# Patient Record
Sex: FEMALE | Race: BLACK OR AFRICAN AMERICAN | NOT HISPANIC OR LATINO | Employment: OTHER | URBAN - METROPOLITAN AREA
[De-identification: names, ages, dates, MRNs, and addresses within clinical notes are randomized per-mention and may not be internally consistent; named-entity substitution may affect disease eponyms.]

---

## 2023-03-08 LAB
ALANINE AMINOTRANSFERASE (SGPT) (U/L) IN SER/PLAS: 22 U/L (ref 7–45)
ALBUMIN (G/DL) IN SER/PLAS: 3.8 G/DL (ref 3.4–5)
ALKALINE PHOSPHATASE (U/L) IN SER/PLAS: 91 U/L (ref 33–136)
ANION GAP IN SER/PLAS: 13 MMOL/L (ref 10–20)
ASPARTATE AMINOTRANSFERASE (SGOT) (U/L) IN SER/PLAS: 20 U/L (ref 9–39)
BASOPHILS (10*3/UL) IN BLOOD BY AUTOMATED COUNT: 0.05 X10E9/L (ref 0–0.1)
BASOPHILS/100 LEUKOCYTES IN BLOOD BY AUTOMATED COUNT: 0.7 % (ref 0–2)
BILIRUBIN DIRECT (MG/DL) IN SER/PLAS: 0.1 MG/DL (ref 0–0.3)
BILIRUBIN TOTAL (MG/DL) IN SER/PLAS: 0.5 MG/DL (ref 0–1.2)
CALCIUM (MG/DL) IN SER/PLAS: 9.6 MG/DL (ref 8.6–10.6)
CARBON DIOXIDE, TOTAL (MMOL/L) IN SER/PLAS: 28 MMOL/L (ref 21–32)
CHLORIDE (MMOL/L) IN SER/PLAS: 107 MMOL/L (ref 98–107)
CHOLESTEROL (MG/DL) IN SER/PLAS: 175 MG/DL (ref 0–199)
CHOLESTEROL IN HDL (MG/DL) IN SER/PLAS: 48.9 MG/DL
CHOLESTEROL/HDL RATIO: 3.6
CREATININE (MG/DL) IN SER/PLAS: 0.91 MG/DL (ref 0.5–1.05)
EOSINOPHILS (10*3/UL) IN BLOOD BY AUTOMATED COUNT: 0.06 X10E9/L (ref 0–0.4)
EOSINOPHILS/100 LEUKOCYTES IN BLOOD BY AUTOMATED COUNT: 0.8 % (ref 0–6)
ERYTHROCYTE DISTRIBUTION WIDTH (RATIO) BY AUTOMATED COUNT: 17 % (ref 11.5–14.5)
ERYTHROCYTE MEAN CORPUSCULAR HEMOGLOBIN CONCENTRATION (G/DL) BY AUTOMATED: 30 G/DL (ref 32–36)
ERYTHROCYTE MEAN CORPUSCULAR VOLUME (FL) BY AUTOMATED COUNT: 89 FL (ref 80–100)
ERYTHROCYTES (10*6/UL) IN BLOOD BY AUTOMATED COUNT: 4.66 X10E12/L (ref 4–5.2)
GFR FEMALE: 67 ML/MIN/1.73M2
GLUCOSE (MG/DL) IN SER/PLAS: 182 MG/DL (ref 74–99)
HEMATOCRIT (%) IN BLOOD BY AUTOMATED COUNT: 41.3 % (ref 36–46)
HEMOGLOBIN (G/DL) IN BLOOD: 12.4 G/DL (ref 12–16)
IMMATURE GRANULOCYTES/100 LEUKOCYTES IN BLOOD BY AUTOMATED COUNT: 0.3 % (ref 0–0.9)
LDL: 104 MG/DL (ref 0–99)
LEUKOCYTES (10*3/UL) IN BLOOD BY AUTOMATED COUNT: 7.4 X10E9/L (ref 4.4–11.3)
LYMPHOCYTES (10*3/UL) IN BLOOD BY AUTOMATED COUNT: 1.3 X10E9/L (ref 0.8–3)
LYMPHOCYTES/100 LEUKOCYTES IN BLOOD BY AUTOMATED COUNT: 17.7 % (ref 13–44)
MONOCYTES (10*3/UL) IN BLOOD BY AUTOMATED COUNT: 0.62 X10E9/L (ref 0.05–0.8)
MONOCYTES/100 LEUKOCYTES IN BLOOD BY AUTOMATED COUNT: 8.4 % (ref 2–10)
NEUTROPHILS (10*3/UL) IN BLOOD BY AUTOMATED COUNT: 5.31 X10E9/L (ref 1.6–5.5)
NEUTROPHILS/100 LEUKOCYTES IN BLOOD BY AUTOMATED COUNT: 72.1 % (ref 40–80)
NRBC (PER 100 WBCS) BY AUTOMATED COUNT: 0 /100 WBC (ref 0–0)
PLATELETS (10*3/UL) IN BLOOD AUTOMATED COUNT: 284 X10E9/L (ref 150–450)
POTASSIUM (MMOL/L) IN SER/PLAS: 4.6 MMOL/L (ref 3.5–5.3)
PROTEIN TOTAL: 6.3 G/DL (ref 6.4–8.2)
SODIUM (MMOL/L) IN SER/PLAS: 143 MMOL/L (ref 136–145)
THYROTROPIN (MIU/L) IN SER/PLAS BY DETECTION LIMIT <= 0.05 MIU/L: 1.74 MIU/L (ref 0.44–3.98)
TRIGLYCERIDE (MG/DL) IN SER/PLAS: 109 MG/DL (ref 0–149)
UREA NITROGEN (MG/DL) IN SER/PLAS: 22 MG/DL (ref 6–23)
VLDL: 22 MG/DL (ref 0–40)

## 2023-09-05 LAB
ALANINE AMINOTRANSFERASE (SGPT) (U/L) IN SER/PLAS: 19 U/L (ref 7–45)
ALBUMIN (G/DL) IN SER/PLAS: 4 G/DL (ref 3.4–5)
ALKALINE PHOSPHATASE (U/L) IN SER/PLAS: 100 U/L (ref 33–136)
ANION GAP IN SER/PLAS: 15 MMOL/L (ref 10–20)
ASPARTATE AMINOTRANSFERASE (SGOT) (U/L) IN SER/PLAS: 21 U/L (ref 9–39)
BASOPHILS (10*3/UL) IN BLOOD BY AUTOMATED COUNT: 0.04 X10E9/L (ref 0–0.1)
BASOPHILS/100 LEUKOCYTES IN BLOOD BY AUTOMATED COUNT: 0.6 % (ref 0–2)
BILIRUBIN DIRECT (MG/DL) IN SER/PLAS: 0.1 MG/DL (ref 0–0.3)
BILIRUBIN TOTAL (MG/DL) IN SER/PLAS: 0.5 MG/DL (ref 0–1.2)
CALCIUM (MG/DL) IN SER/PLAS: 9.6 MG/DL (ref 8.6–10.6)
CARBON DIOXIDE, TOTAL (MMOL/L) IN SER/PLAS: 28 MMOL/L (ref 21–32)
CHLORIDE (MMOL/L) IN SER/PLAS: 104 MMOL/L (ref 98–107)
CHOLESTEROL (MG/DL) IN SER/PLAS: 197 MG/DL (ref 0–199)
CHOLESTEROL IN HDL (MG/DL) IN SER/PLAS: 47.9 MG/DL
CHOLESTEROL/HDL RATIO: 4.1
CREATININE (MG/DL) IN SER/PLAS: 0.57 MG/DL (ref 0.5–1.05)
EOSINOPHILS (10*3/UL) IN BLOOD BY AUTOMATED COUNT: 0.05 X10E9/L (ref 0–0.4)
EOSINOPHILS/100 LEUKOCYTES IN BLOOD BY AUTOMATED COUNT: 0.7 % (ref 0–6)
ERYTHROCYTE DISTRIBUTION WIDTH (RATIO) BY AUTOMATED COUNT: 15.8 % (ref 11.5–14.5)
ERYTHROCYTE MEAN CORPUSCULAR HEMOGLOBIN CONCENTRATION (G/DL) BY AUTOMATED: 32.5 G/DL (ref 32–36)
ERYTHROCYTE MEAN CORPUSCULAR VOLUME (FL) BY AUTOMATED COUNT: 86 FL (ref 80–100)
ERYTHROCYTES (10*6/UL) IN BLOOD BY AUTOMATED COUNT: 4.74 X10E12/L (ref 4–5.2)
GFR FEMALE: >90 ML/MIN/1.73M2
GLUCOSE (MG/DL) IN SER/PLAS: 127 MG/DL (ref 74–99)
HEMATOCRIT (%) IN BLOOD BY AUTOMATED COUNT: 40.9 % (ref 36–46)
HEMOGLOBIN (G/DL) IN BLOOD: 13.3 G/DL (ref 12–16)
IMMATURE GRANULOCYTES/100 LEUKOCYTES IN BLOOD BY AUTOMATED COUNT: 0.3 % (ref 0–0.9)
LDL: 128 MG/DL (ref 0–99)
LEUKOCYTES (10*3/UL) IN BLOOD BY AUTOMATED COUNT: 7 X10E9/L (ref 4.4–11.3)
LYMPHOCYTES (10*3/UL) IN BLOOD BY AUTOMATED COUNT: 1.4 X10E9/L (ref 0.8–3)
LYMPHOCYTES/100 LEUKOCYTES IN BLOOD BY AUTOMATED COUNT: 19.9 % (ref 13–44)
MONOCYTES (10*3/UL) IN BLOOD BY AUTOMATED COUNT: 0.58 X10E9/L (ref 0.05–0.8)
MONOCYTES/100 LEUKOCYTES IN BLOOD BY AUTOMATED COUNT: 8.2 % (ref 2–10)
NEUTROPHILS (10*3/UL) IN BLOOD BY AUTOMATED COUNT: 4.95 X10E9/L (ref 1.6–5.5)
NEUTROPHILS/100 LEUKOCYTES IN BLOOD BY AUTOMATED COUNT: 70.3 % (ref 40–80)
NRBC (PER 100 WBCS) BY AUTOMATED COUNT: 0 /100 WBC (ref 0–0)
PLATELETS (10*3/UL) IN BLOOD AUTOMATED COUNT: 268 X10E9/L (ref 150–450)
POTASSIUM (MMOL/L) IN SER/PLAS: 4.9 MMOL/L (ref 3.5–5.3)
PROTEIN TOTAL: 6.5 G/DL (ref 6.4–8.2)
SODIUM (MMOL/L) IN SER/PLAS: 142 MMOL/L (ref 136–145)
TRIGLYCERIDE (MG/DL) IN SER/PLAS: 108 MG/DL (ref 0–149)
UREA NITROGEN (MG/DL) IN SER/PLAS: 15 MG/DL (ref 6–23)
VLDL: 22 MG/DL (ref 0–40)

## 2023-09-28 ENCOUNTER — HOSPITAL ENCOUNTER (OUTPATIENT)
Dept: DATA CONVERSION | Facility: HOSPITAL | Age: 73
End: 2023-09-28
Attending: OPHTHALMOLOGY | Admitting: OPHTHALMOLOGY
Payer: MEDICARE

## 2023-09-28 DIAGNOSIS — H25.812 COMBINED FORMS OF AGE-RELATED CATARACT, LEFT EYE: ICD-10-CM

## 2023-09-28 LAB — POCT GLUCOSE: 109 MG/DL (ref 74–99)

## 2023-09-29 VITALS
HEART RATE: 57 BPM | RESPIRATION RATE: 16 BRPM | TEMPERATURE: 97.2 F | DIASTOLIC BLOOD PRESSURE: 58 MMHG | SYSTOLIC BLOOD PRESSURE: 124 MMHG

## 2023-09-30 ENCOUNTER — HOSPITAL ENCOUNTER (INPATIENT)
Facility: HOSPITAL | Age: 73
LOS: 5 days | Discharge: HOME | DRG: 291 | End: 2023-10-05
Attending: EMERGENCY MEDICINE | Admitting: INTERNAL MEDICINE
Payer: MEDICARE

## 2023-09-30 ENCOUNTER — APPOINTMENT (OUTPATIENT)
Dept: RADIOLOGY | Facility: HOSPITAL | Age: 73
DRG: 291 | End: 2023-09-30
Payer: MEDICARE

## 2023-09-30 ENCOUNTER — APPOINTMENT (OUTPATIENT)
Dept: CARDIOLOGY | Facility: HOSPITAL | Age: 73
DRG: 291 | End: 2023-09-30
Payer: MEDICARE

## 2023-09-30 DIAGNOSIS — I50.33 ACUTE ON CHRONIC HEART FAILURE WITH PRESERVED EJECTION FRACTION (MULTI): Primary | Chronic | ICD-10-CM

## 2023-09-30 DIAGNOSIS — J16.0 CAP (COMMUNITY ACQUIRED PNEUMONIA) DUE TO CHLAMYDIA SPECIES: ICD-10-CM

## 2023-09-30 DIAGNOSIS — J45.909 ASTHMA, UNSPECIFIED ASTHMA SEVERITY, UNSPECIFIED WHETHER COMPLICATED, UNSPECIFIED WHETHER PERSISTENT (HHS-HCC): Chronic | ICD-10-CM

## 2023-09-30 DIAGNOSIS — I10 HYPERTENSION, UNSPECIFIED TYPE: ICD-10-CM

## 2023-09-30 LAB
ALBUMIN SERPL BCP-MCNC: 4 G/DL (ref 3.4–5)
ALP SERPL-CCNC: 113 U/L (ref 33–136)
ALT SERPL W P-5'-P-CCNC: 45 U/L (ref 7–45)
ANION GAP SERPL CALC-SCNC: 9 MMOL/L (ref 10–20)
APPEARANCE UR: CLEAR
APTT PPP: 42 SECONDS (ref 27–38)
AST SERPL W P-5'-P-CCNC: 33 U/L (ref 9–39)
BASOPHILS # BLD AUTO: 0.04 X10*3/UL (ref 0–0.1)
BASOPHILS NFR BLD AUTO: 0.3 %
BILIRUB SERPL-MCNC: 0.4 MG/DL (ref 0–1.2)
BILIRUB UR STRIP.AUTO-MCNC: NEGATIVE MG/DL
BNP SERPL-MCNC: 379 PG/ML (ref 0–99)
BUN SERPL-MCNC: 14 MG/DL (ref 6–23)
CALCIUM SERPL-MCNC: 9.1 MG/DL (ref 8.6–10.3)
CARDIAC TROPONIN I PNL SERPL HS: 11 NG/L (ref 0–13)
CHLORIDE SERPL-SCNC: 102 MMOL/L (ref 98–107)
CO2 SERPL-SCNC: 31 MMOL/L (ref 21–32)
COLOR UR: COLORLESS
CREAT SERPL-MCNC: 0.71 MG/DL (ref 0.5–1.05)
EOSINOPHIL # BLD AUTO: 0.12 X10*3/UL (ref 0–0.4)
EOSINOPHIL NFR BLD AUTO: 1 %
ERYTHROCYTE [DISTWIDTH] IN BLOOD BY AUTOMATED COUNT: 15.5 % (ref 11.5–14.5)
FLUAV RNA RESP QL NAA+PROBE: NOT DETECTED
FLUBV RNA RESP QL NAA+PROBE: NOT DETECTED
GFR SERPL CREATININE-BSD FRML MDRD: 90 ML/MIN/1.73M*2
GLUCOSE SERPL-MCNC: 276 MG/DL (ref 74–99)
GLUCOSE UR STRIP.AUTO-MCNC: ABNORMAL MG/DL
HCT VFR BLD AUTO: 41.2 % (ref 36–46)
HGB BLD-MCNC: 12.9 G/DL (ref 12–16)
IMM GRANULOCYTES # BLD AUTO: 0.04 X10*3/UL (ref 0–0.5)
IMM GRANULOCYTES NFR BLD AUTO: 0.3 % (ref 0–0.9)
INR PPP: 3.4 (ref 0.9–1.1)
KETONES UR STRIP.AUTO-MCNC: NEGATIVE MG/DL
LEUKOCYTE ESTERASE UR QL STRIP.AUTO: ABNORMAL
LYMPHOCYTES # BLD AUTO: 1.31 X10*3/UL (ref 0.8–3)
LYMPHOCYTES NFR BLD AUTO: 11.3 %
MAGNESIUM SERPL-MCNC: 1.7 MG/DL (ref 1.6–2.4)
MCH RBC QN AUTO: 27 PG (ref 26–34)
MCHC RBC AUTO-ENTMCNC: 31.3 G/DL (ref 32–36)
MCV RBC AUTO: 86 FL (ref 80–100)
MONOCYTES # BLD AUTO: 0.97 X10*3/UL (ref 0.05–0.8)
MONOCYTES NFR BLD AUTO: 8.4 %
NEUTROPHILS # BLD AUTO: 9.07 X10*3/UL (ref 1.6–5.5)
NEUTROPHILS NFR BLD AUTO: 78.7 %
NITRITE UR QL STRIP.AUTO: NEGATIVE
NRBC BLD-RTO: 0 /100 WBCS (ref 0–0)
PH UR STRIP.AUTO: 7 [PH]
PLATELET # BLD AUTO: 297 X10*3/UL (ref 150–450)
PMV BLD AUTO: 9.6 FL (ref 7.5–11.5)
POTASSIUM SERPL-SCNC: 4.2 MMOL/L (ref 3.5–5.3)
PROT SERPL-MCNC: 7.2 G/DL (ref 6.4–8.2)
PROT UR STRIP.AUTO-MCNC: NEGATIVE MG/DL
PROTHROMBIN TIME: 39 SECONDS (ref 9.8–12.8)
RBC # BLD AUTO: 4.77 X10*6/UL (ref 4–5.2)
RBC # UR STRIP.AUTO: NEGATIVE /UL
RBC #/AREA URNS AUTO: NORMAL /HPF
RSV RNA RESP QL NAA+PROBE: NOT DETECTED
SARS-COV-2 RNA RESP QL NAA+PROBE: NOT DETECTED
SODIUM SERPL-SCNC: 138 MMOL/L (ref 136–145)
SP GR UR STRIP.AUTO: 1
SQUAMOUS #/AREA URNS AUTO: NORMAL /HPF
UROBILINOGEN UR STRIP.AUTO-MCNC: <2 MG/DL
WBC # BLD AUTO: 11.6 X10*3/UL (ref 4.4–11.3)
WBC #/AREA URNS AUTO: NORMAL /HPF

## 2023-09-30 PROCEDURE — 84484 ASSAY OF TROPONIN QUANT: CPT | Performed by: EMERGENCY MEDICINE

## 2023-09-30 PROCEDURE — 83880 ASSAY OF NATRIURETIC PEPTIDE: CPT | Performed by: EMERGENCY MEDICINE

## 2023-09-30 PROCEDURE — 1100000001 HC PRIVATE ROOM DAILY

## 2023-09-30 PROCEDURE — 93005 ELECTROCARDIOGRAM TRACING: CPT

## 2023-09-30 PROCEDURE — 94640 AIRWAY INHALATION TREATMENT: CPT

## 2023-09-30 PROCEDURE — 2500000004 HC RX 250 GENERAL PHARMACY W/ HCPCS (ALT 636 FOR OP/ED): Performed by: EMERGENCY MEDICINE

## 2023-09-30 PROCEDURE — 87634 RSV DNA/RNA AMP PROBE: CPT | Performed by: EMERGENCY MEDICINE

## 2023-09-30 PROCEDURE — 99285 EMERGENCY DEPT VISIT HI MDM: CPT | Mod: 25 | Performed by: EMERGENCY MEDICINE

## 2023-09-30 PROCEDURE — 71045 X-RAY EXAM CHEST 1 VIEW: CPT | Performed by: RADIOLOGY

## 2023-09-30 PROCEDURE — 70450 CT HEAD/BRAIN W/O DYE: CPT | Performed by: RADIOLOGY

## 2023-09-30 PROCEDURE — 2500000001 HC RX 250 WO HCPCS SELF ADMINISTERED DRUGS (ALT 637 FOR MEDICARE OP): Performed by: INTERNAL MEDICINE

## 2023-09-30 PROCEDURE — 71045 X-RAY EXAM CHEST 1 VIEW: CPT | Mod: FY

## 2023-09-30 PROCEDURE — 85025 COMPLETE CBC W/AUTO DIFF WBC: CPT | Performed by: EMERGENCY MEDICINE

## 2023-09-30 PROCEDURE — 80053 COMPREHEN METABOLIC PANEL: CPT | Performed by: EMERGENCY MEDICINE

## 2023-09-30 PROCEDURE — 87635 SARS-COV-2 COVID-19 AMP PRB: CPT | Performed by: EMERGENCY MEDICINE

## 2023-09-30 PROCEDURE — 83735 ASSAY OF MAGNESIUM: CPT | Performed by: EMERGENCY MEDICINE

## 2023-09-30 PROCEDURE — 2500000002 HC RX 250 W HCPCS SELF ADMINISTERED DRUGS (ALT 637 FOR MEDICARE OP, ALT 636 FOR OP/ED): Performed by: EMERGENCY MEDICINE

## 2023-09-30 PROCEDURE — 81001 URINALYSIS AUTO W/SCOPE: CPT | Performed by: EMERGENCY MEDICINE

## 2023-09-30 PROCEDURE — 36415 COLL VENOUS BLD VENIPUNCTURE: CPT | Performed by: EMERGENCY MEDICINE

## 2023-09-30 PROCEDURE — 85610 PROTHROMBIN TIME: CPT | Performed by: EMERGENCY MEDICINE

## 2023-09-30 PROCEDURE — 87636 SARSCOV2 & INF A&B AMP PRB: CPT | Performed by: EMERGENCY MEDICINE

## 2023-09-30 PROCEDURE — 2500000004 HC RX 250 GENERAL PHARMACY W/ HCPCS (ALT 636 FOR OP/ED): Performed by: INTERNAL MEDICINE

## 2023-09-30 PROCEDURE — 2500000002 HC RX 250 W HCPCS SELF ADMINISTERED DRUGS (ALT 637 FOR MEDICARE OP, ALT 636 FOR OP/ED): Performed by: INTERNAL MEDICINE

## 2023-09-30 PROCEDURE — 70450 CT HEAD/BRAIN W/O DYE: CPT | Mod: MG

## 2023-09-30 PROCEDURE — 2500000004 HC RX 250 GENERAL PHARMACY W/ HCPCS (ALT 636 FOR OP/ED)

## 2023-09-30 RX ORDER — IPRATROPIUM BROMIDE AND ALBUTEROL SULFATE 2.5; .5 MG/3ML; MG/3ML
3 SOLUTION RESPIRATORY (INHALATION)
COMMUNITY
Start: 2023-01-18

## 2023-09-30 RX ORDER — FLUTICASONE FUROATE AND VILANTEROL 200; 25 UG/1; UG/1
1 POWDER RESPIRATORY (INHALATION)
Status: DISCONTINUED | OUTPATIENT
Start: 2023-10-01 | End: 2023-09-30

## 2023-09-30 RX ORDER — ATORVASTATIN CALCIUM 80 MG/1
80 TABLET, FILM COATED ORAL DAILY
Status: DISCONTINUED | OUTPATIENT
Start: 2023-09-30 | End: 2023-10-05 | Stop reason: HOSPADM

## 2023-09-30 RX ORDER — FUROSEMIDE 10 MG/ML
40 INJECTION INTRAMUSCULAR; INTRAVENOUS 2 TIMES DAILY
Status: DISCONTINUED | OUTPATIENT
Start: 2023-09-30 | End: 2023-09-30

## 2023-09-30 RX ORDER — IPRATROPIUM BROMIDE AND ALBUTEROL SULFATE 2.5; .5 MG/3ML; MG/3ML
3 SOLUTION RESPIRATORY (INHALATION)
Status: DISCONTINUED | OUTPATIENT
Start: 2023-09-30 | End: 2023-09-30

## 2023-09-30 RX ORDER — DILTIAZEM HYDROCHLORIDE EXTENDED-RELEASE TABLETS 180 MG/1
1 TABLET, EXTENDED RELEASE ORAL DAILY
COMMUNITY

## 2023-09-30 RX ORDER — GLIPIZIDE 5 MG/1
5 TABLET ORAL
Status: ON HOLD | COMMUNITY
Start: 2014-08-20 | End: 2023-10-05

## 2023-09-30 RX ORDER — IPRATROPIUM BROMIDE AND ALBUTEROL SULFATE 2.5; .5 MG/3ML; MG/3ML
3 SOLUTION RESPIRATORY (INHALATION) ONCE
Status: COMPLETED | OUTPATIENT
Start: 2023-09-30 | End: 2023-09-30

## 2023-09-30 RX ORDER — CEFTRIAXONE 1 G/50ML
1 INJECTION, SOLUTION INTRAVENOUS EVERY 24 HOURS
Status: DISCONTINUED | OUTPATIENT
Start: 2023-10-01 | End: 2023-10-02

## 2023-09-30 RX ORDER — LOSARTAN POTASSIUM 100 MG/1
100 TABLET ORAL DAILY
Status: ON HOLD | COMMUNITY
Start: 2014-11-14 | End: 2023-10-05 | Stop reason: ALTCHOICE

## 2023-09-30 RX ORDER — METFORMIN HYDROCHLORIDE 500 MG/1
1000 TABLET, EXTENDED RELEASE ORAL
COMMUNITY
Start: 2014-07-31

## 2023-09-30 RX ORDER — HYDRALAZINE HYDROCHLORIDE 50 MG/1
100 TABLET, FILM COATED ORAL 3 TIMES DAILY
Status: DISCONTINUED | OUTPATIENT
Start: 2023-09-30 | End: 2023-10-05 | Stop reason: HOSPADM

## 2023-09-30 RX ORDER — DEXTROSE MONOHYDRATE 100 MG/ML
0.3 INJECTION, SOLUTION INTRAVENOUS ONCE AS NEEDED
Status: DISCONTINUED | OUTPATIENT
Start: 2023-09-30 | End: 2023-10-03

## 2023-09-30 RX ORDER — ACETAMINOPHEN 160 MG/5ML
650 SOLUTION ORAL EVERY 6 HOURS PRN
Status: DISCONTINUED | OUTPATIENT
Start: 2023-09-30 | End: 2023-10-05 | Stop reason: HOSPADM

## 2023-09-30 RX ORDER — HYDRALAZINE HYDROCHLORIDE 50 MG/1
50 TABLET, FILM COATED ORAL 3 TIMES DAILY
Status: ON HOLD | COMMUNITY
Start: 2017-10-27 | End: 2023-10-05 | Stop reason: SDUPTHER

## 2023-09-30 RX ORDER — TRAMADOL HYDROCHLORIDE 50 MG/1
50 TABLET ORAL EVERY 8 HOURS PRN
COMMUNITY
Start: 2022-05-20

## 2023-09-30 RX ORDER — IPRATROPIUM BROMIDE AND ALBUTEROL SULFATE 2.5; .5 MG/3ML; MG/3ML
3 SOLUTION RESPIRATORY (INHALATION)
Status: DISCONTINUED | OUTPATIENT
Start: 2023-10-01 | End: 2023-10-05 | Stop reason: HOSPADM

## 2023-09-30 RX ORDER — FLUTICASONE PROPIONATE 50 MCG
1 SPRAY, SUSPENSION (ML) NASAL DAILY PRN
Status: DISCONTINUED | OUTPATIENT
Start: 2023-09-30 | End: 2023-10-05 | Stop reason: HOSPADM

## 2023-09-30 RX ORDER — ACETAMINOPHEN 325 MG/1
650 TABLET ORAL EVERY 6 HOURS PRN
Status: DISCONTINUED | OUTPATIENT
Start: 2023-09-30 | End: 2023-10-05 | Stop reason: HOSPADM

## 2023-09-30 RX ORDER — IPRATROPIUM BROMIDE 0.5 MG/2.5ML
0.5 SOLUTION RESPIRATORY (INHALATION) ONCE
Status: DISCONTINUED | OUTPATIENT
Start: 2023-09-30 | End: 2023-09-30

## 2023-09-30 RX ORDER — ACETAMINOPHEN 650 MG/1
650 SUPPOSITORY RECTAL EVERY 6 HOURS PRN
Status: DISCONTINUED | OUTPATIENT
Start: 2023-09-30 | End: 2023-10-05 | Stop reason: HOSPADM

## 2023-09-30 RX ORDER — DEXTROSE 50 % IN WATER (D50W) INTRAVENOUS SYRINGE
25
Status: DISCONTINUED | OUTPATIENT
Start: 2023-09-30 | End: 2023-10-03

## 2023-09-30 RX ORDER — GLIPIZIDE 5 MG/1
5 TABLET ORAL
Status: DISCONTINUED | OUTPATIENT
Start: 2023-10-01 | End: 2023-09-30

## 2023-09-30 RX ORDER — MONTELUKAST SODIUM 10 MG/1
10 TABLET ORAL NIGHTLY
Status: DISCONTINUED | OUTPATIENT
Start: 2023-09-30 | End: 2023-10-05 | Stop reason: HOSPADM

## 2023-09-30 RX ORDER — DILTIAZEM HYDROCHLORIDE 180 MG/1
180 CAPSULE, EXTENDED RELEASE ORAL DAILY
Status: DISCONTINUED | OUTPATIENT
Start: 2023-09-30 | End: 2023-10-03 | Stop reason: CLARIF

## 2023-09-30 RX ORDER — METFORMIN HYDROCHLORIDE 500 MG/1
1000 TABLET ORAL
Status: DISCONTINUED | OUTPATIENT
Start: 2023-10-01 | End: 2023-10-01

## 2023-09-30 RX ORDER — PRIMIDONE 50 MG/1
50 TABLET ORAL 3 TIMES DAILY
COMMUNITY
Start: 2022-11-23 | End: 2023-12-18 | Stop reason: SDUPTHER

## 2023-09-30 RX ORDER — METOPROLOL TARTRATE 50 MG/1
50 TABLET ORAL 2 TIMES DAILY
COMMUNITY
Start: 2022-05-19

## 2023-09-30 RX ORDER — GABAPENTIN 600 MG/1
1200 TABLET ORAL 3 TIMES DAILY
COMMUNITY
Start: 2017-12-29 | End: 2024-03-10

## 2023-09-30 RX ORDER — OMEPRAZOLE 20 MG/1
40 CAPSULE, DELAYED RELEASE ORAL
COMMUNITY
Start: 2014-04-21

## 2023-09-30 RX ORDER — FUROSEMIDE 10 MG/ML
INJECTION INTRAMUSCULAR; INTRAVENOUS
Status: COMPLETED
Start: 2023-09-30 | End: 2023-09-30

## 2023-09-30 RX ORDER — POLYETHYLENE GLYCOL 3350 17 G/17G
17 POWDER, FOR SOLUTION ORAL DAILY
Status: DISCONTINUED | OUTPATIENT
Start: 2023-09-30 | End: 2023-10-05 | Stop reason: HOSPADM

## 2023-09-30 RX ORDER — CLONIDINE HYDROCHLORIDE 0.1 MG/1
0.1 TABLET ORAL EVERY 12 HOURS SCHEDULED
Status: DISCONTINUED | OUTPATIENT
Start: 2023-09-30 | End: 2023-10-05 | Stop reason: HOSPADM

## 2023-09-30 RX ORDER — BRIMONIDINE TARTRATE 2 MG/ML
1 SOLUTION/ DROPS OPHTHALMIC 2 TIMES DAILY
COMMUNITY
Start: 2021-02-16 | End: 2024-01-30 | Stop reason: SDUPTHER

## 2023-09-30 RX ORDER — CLONIDINE HYDROCHLORIDE 0.1 MG/1
0.1 TABLET ORAL 2 TIMES DAILY
COMMUNITY
Start: 2014-11-14

## 2023-09-30 RX ORDER — ALBUTEROL SULFATE 0.83 MG/ML
2.5 SOLUTION RESPIRATORY (INHALATION) EVERY 6 HOURS PRN
Status: DISCONTINUED | OUTPATIENT
Start: 2023-09-30 | End: 2023-09-30

## 2023-09-30 RX ORDER — MONTELUKAST SODIUM 10 MG/1
10 TABLET ORAL NIGHTLY
COMMUNITY
Start: 2014-08-11

## 2023-09-30 RX ORDER — FLUTICASONE PROPIONATE 50 MCG
1 SPRAY, SUSPENSION (ML) NASAL DAILY
COMMUNITY
Start: 2014-05-08

## 2023-09-30 RX ORDER — AMLODIPINE BESYLATE 10 MG/1
1 TABLET ORAL DAILY
Status: ON HOLD | COMMUNITY
Start: 2014-04-25 | End: 2023-10-02 | Stop reason: ENTERED-IN-ERROR

## 2023-09-30 RX ORDER — DULOXETIN HYDROCHLORIDE 30 MG/1
30 CAPSULE, DELAYED RELEASE ORAL DAILY
COMMUNITY
Start: 2023-06-05

## 2023-09-30 RX ORDER — GABAPENTIN 300 MG/1
600 CAPSULE ORAL 3 TIMES DAILY
Status: DISCONTINUED | OUTPATIENT
Start: 2023-09-30 | End: 2023-10-05 | Stop reason: HOSPADM

## 2023-09-30 RX ORDER — DORZOLAMIDE HCL 20 MG/ML
1 SOLUTION/ DROPS OPHTHALMIC 3 TIMES DAILY
COMMUNITY
Start: 2019-04-24 | End: 2024-01-30 | Stop reason: SDUPTHER

## 2023-09-30 RX ORDER — INSULIN LISPRO 100 [IU]/ML
0-5 INJECTION, SOLUTION INTRAVENOUS; SUBCUTANEOUS
Status: DISCONTINUED | OUTPATIENT
Start: 2023-10-01 | End: 2023-10-04

## 2023-09-30 RX ORDER — BENZONATATE 100 MG/1
100 CAPSULE ORAL 3 TIMES DAILY PRN
Status: DISCONTINUED | OUTPATIENT
Start: 2023-09-30 | End: 2023-10-05 | Stop reason: HOSPADM

## 2023-09-30 RX ORDER — TOPIRAMATE 25 MG/1
50 TABLET ORAL 2 TIMES DAILY
COMMUNITY
Start: 2022-12-27

## 2023-09-30 RX ORDER — DULOXETIN HYDROCHLORIDE 30 MG/1
30 CAPSULE, DELAYED RELEASE ORAL DAILY
Status: DISCONTINUED | OUTPATIENT
Start: 2023-09-30 | End: 2023-09-30

## 2023-09-30 RX ORDER — CEFTRIAXONE 1 G/50ML
1 INJECTION, SOLUTION INTRAVENOUS ONCE
Status: COMPLETED | OUTPATIENT
Start: 2023-09-30 | End: 2023-09-30

## 2023-09-30 RX ORDER — AZITHROMYCIN 500 MG/1
500 TABLET, FILM COATED ORAL
Status: DISCONTINUED | OUTPATIENT
Start: 2023-10-01 | End: 2023-10-02

## 2023-09-30 RX ORDER — METOPROLOL TARTRATE 25 MG/1
50 TABLET, FILM COATED ORAL 2 TIMES DAILY
Status: DISCONTINUED | OUTPATIENT
Start: 2023-09-30 | End: 2023-10-05 | Stop reason: HOSPADM

## 2023-09-30 RX ORDER — TOPIRAMATE 25 MG/1
50 TABLET ORAL 2 TIMES DAILY
Status: DISCONTINUED | OUTPATIENT
Start: 2023-09-30 | End: 2023-10-05 | Stop reason: HOSPADM

## 2023-09-30 RX ORDER — BENZONATATE 100 MG/1
100 CAPSULE ORAL 3 TIMES DAILY PRN
Status: ON HOLD | COMMUNITY
Start: 2021-10-22 | End: 2023-10-05 | Stop reason: ALTCHOICE

## 2023-09-30 RX ORDER — ATORVASTATIN CALCIUM 80 MG/1
80 TABLET, FILM COATED ORAL DAILY
COMMUNITY
Start: 2014-07-31

## 2023-09-30 RX ORDER — BRIMONIDINE TARTRATE 2 MG/ML
1 SOLUTION/ DROPS OPHTHALMIC 2 TIMES DAILY
Status: DISCONTINUED | OUTPATIENT
Start: 2023-09-30 | End: 2023-10-05 | Stop reason: HOSPADM

## 2023-09-30 RX ORDER — PANTOPRAZOLE SODIUM 40 MG/1
40 TABLET, DELAYED RELEASE ORAL
Status: DISCONTINUED | OUTPATIENT
Start: 2023-10-01 | End: 2023-10-05 | Stop reason: HOSPADM

## 2023-09-30 RX ORDER — TRAMADOL HYDROCHLORIDE 50 MG/1
50 TABLET ORAL EVERY 8 HOURS PRN
Status: DISCONTINUED | OUTPATIENT
Start: 2023-09-30 | End: 2023-10-05 | Stop reason: HOSPADM

## 2023-09-30 RX ORDER — FUROSEMIDE 10 MG/ML
40 INJECTION INTRAMUSCULAR; INTRAVENOUS ONCE
Status: COMPLETED | OUTPATIENT
Start: 2023-09-30 | End: 2023-09-30

## 2023-09-30 RX ORDER — PRIMIDONE 50 MG/1
50 TABLET ORAL 2 TIMES DAILY
Status: DISCONTINUED | OUTPATIENT
Start: 2023-09-30 | End: 2023-10-05 | Stop reason: HOSPADM

## 2023-09-30 RX ORDER — FLUTICASONE PROPIONATE AND SALMETEROL 500; 50 UG/1; UG/1
1 POWDER RESPIRATORY (INHALATION)
COMMUNITY
Start: 2014-12-30

## 2023-09-30 RX ORDER — DAPAGLIFLOZIN 5 MG/1
5 TABLET, FILM COATED ORAL
COMMUNITY
Start: 2023-04-04

## 2023-09-30 RX ORDER — IPRATROPIUM BROMIDE AND ALBUTEROL SULFATE 2.5; .5 MG/3ML; MG/3ML
3 SOLUTION RESPIRATORY (INHALATION) EVERY 2 HOUR PRN
Status: DISCONTINUED | OUTPATIENT
Start: 2023-09-30 | End: 2023-10-05 | Stop reason: HOSPADM

## 2023-09-30 RX ORDER — AMLODIPINE BESYLATE 10 MG/1
10 TABLET ORAL DAILY
Status: DISCONTINUED | OUTPATIENT
Start: 2023-09-30 | End: 2023-09-30

## 2023-09-30 RX ADMIN — ATORVASTATIN CALCIUM 80 MG: 80 TABLET, FILM COATED ORAL at 20:30

## 2023-09-30 RX ADMIN — HYDRALAZINE HYDROCHLORIDE 100 MG: 50 TABLET ORAL at 23:30

## 2023-09-30 RX ADMIN — CLONIDINE HYDROCHLORIDE 0.1 MG: 0.1 TABLET ORAL at 20:30

## 2023-09-30 RX ADMIN — GABAPENTIN 600 MG: 300 CAPSULE ORAL at 20:30

## 2023-09-30 RX ADMIN — METOPROLOL TARTRATE 50 MG: 25 TABLET, FILM COATED ORAL at 20:30

## 2023-09-30 RX ADMIN — POLYETHYLENE GLYCOL (3350) 17 G: 17 POWDER, FOR SOLUTION ORAL at 20:29

## 2023-09-30 RX ADMIN — CEFTRIAXONE 1 G: 1 INJECTION, SOLUTION INTRAVENOUS at 17:00

## 2023-09-30 RX ADMIN — BRIMONIDINE TARTRATE 1 DROP: 2 SOLUTION/ DROPS OPHTHALMIC at 23:30

## 2023-09-30 RX ADMIN — IPRATROPIUM BROMIDE AND ALBUTEROL SULFATE 3 ML: .5; 3 SOLUTION RESPIRATORY (INHALATION) at 16:46

## 2023-09-30 RX ADMIN — PRIMIDONE 50 MG: 50 TABLET ORAL at 23:31

## 2023-09-30 RX ADMIN — MONTELUKAST 10 MG: 10 TABLET, FILM COATED ORAL at 20:30

## 2023-09-30 RX ADMIN — IPRATROPIUM BROMIDE AND ALBUTEROL SULFATE 3 ML: .5; 3 SOLUTION RESPIRATORY (INHALATION) at 21:32

## 2023-09-30 RX ADMIN — TOPIRAMATE 50 MG: 25 TABLET, FILM COATED ORAL at 20:30

## 2023-09-30 RX ADMIN — FUROSEMIDE 40 MG: 10 INJECTION INTRAMUSCULAR; INTRAVENOUS at 16:50

## 2023-09-30 RX ADMIN — FUROSEMIDE 40 MG: 10 INJECTION, SOLUTION INTRAMUSCULAR; INTRAVENOUS at 16:50

## 2023-09-30 RX ADMIN — AZITHROMYCIN 500 MG: 500 INJECTION, POWDER, LYOPHILIZED, FOR SOLUTION INTRAVENOUS at 17:30

## 2023-09-30 ASSESSMENT — PAIN DESCRIPTION - DESCRIPTORS: DESCRIPTORS: ACHING

## 2023-09-30 ASSESSMENT — PAIN DESCRIPTION - PAIN TYPE: TYPE: ACUTE PAIN

## 2023-09-30 ASSESSMENT — COLUMBIA-SUICIDE SEVERITY RATING SCALE - C-SSRS
6. HAVE YOU EVER DONE ANYTHING, STARTED TO DO ANYTHING, OR PREPARED TO DO ANYTHING TO END YOUR LIFE?: NO
1. IN THE PAST MONTH, HAVE YOU WISHED YOU WERE DEAD OR WISHED YOU COULD GO TO SLEEP AND NOT WAKE UP?: NO
2. HAVE YOU ACTUALLY HAD ANY THOUGHTS OF KILLING YOURSELF?: NO

## 2023-09-30 ASSESSMENT — PAIN SCALES - GENERAL: PAINLEVEL_OUTOF10: 6

## 2023-09-30 ASSESSMENT — PAIN DESCRIPTION - LOCATION: LOCATION: CHEST

## 2023-09-30 ASSESSMENT — PAIN - FUNCTIONAL ASSESSMENT: PAIN_FUNCTIONAL_ASSESSMENT: 0-10

## 2023-09-30 NOTE — ED PROVIDER NOTES
HPI   Chief Complaint   Patient presents with   • Headache   • Cough       73-year-old female arrives with cough for 2 days yellow phlegm and shortness of breath.  She denies fever but is in obvious distress with some stomach breathing.  She is a diabetic.  Glucose was 276 from the lab she has an elevated white count of 11.6 with a neutrophilic shift.  Sepsis orders were added once these labs were obtained and I ordered ceftriaxone as she has an amoxicillin allergy but it is listed as only diarrhea and she feels comfortable taking that.  In addition I ordered Zithromax which is not listed as an allergy for her.  She is also receiving a DuoNeb.  Because of the beta natruretic peptide being over 300 with pitting edema and JVD as well as pulmonary edema on the chest x-ray I ordered 40 mg of Lasix IV.  I spoke directly to her internal medicine physician who was kind enough to admit and requested inpatient therapy.          ROS  General Appears in distress  HEENT: No sore throat, No Visual Loss, No Headache, No Ear Pain  Neck: Denies neck pain  Chest: No chest pain, no pleuritic pain, no chest wall injury  Pulmonary: Positive SOB, positive cough, positive yellow sputum production, positive wheezing  GI: No abdominal pain, no nausea or vomiting, no diarrhea.  : No dysuria, no frequency, no hematuria.  Extremities: No musculoskeletal pain, normal ambulation, no paresthesia.  Positive bilateral lower extremity edema  Psych: Normal interaction, no anxiety, no depression, no suicidal ideation  Skin: No rashes    ROS is otherwise negative   PE: General: Appears in distress        HEENT: Throat is moist without exudate, midline uvula dentate intact, Tms clear with normal anatomy.        Neck: Supple non tender        Chest CTA, good AE, no wheezing, positive bilateral lower extremity rales, negative rhonci        CVA: RRR S1S2 no S3S4 or murmur        ABD: W/S/NT no HSM, no pulsatile masses, good bowel sounds         Extremities: Excellent distal pulses, brisk capillary refill. Full ROM positive bilateral lower extremity edema        Psych: Normal interactions with no signs of depression  or suicidal ideation.        Neuro: Alert and oriented, moves all and feels all.   CT head wo IV contrast    (Results Pending)   XR chest 1 view    (Results Pending)       CXR reviewed by me pulmonary edema possible patchy RLL  CT Hear read by me artifact circular lesion initial axial, repeat no obvious bleed.  Results for orders placed or performed during the hospital encounter of 09/30/23   SARS-CoV-2 RT PCR   Result Value Ref Range    Coronavirus 2019, PCR Not Detected Not Detected   Influenza A, and B PCR   Result Value Ref Range    Flu A Result Not Detected Not Detected    Flu B Result Not Detected Not Detected   CBC and Auto Differential   Result Value Ref Range    WBC 11.6 (H) 4.4 - 11.3 x10*3/uL    nRBC 0.0 0.0 - 0.0 /100 WBCs    RBC 4.77 4.00 - 5.20 x10*6/uL    Hemoglobin 12.9 12.0 - 16.0 g/dL    Hematocrit 41.2 36.0 - 46.0 %    MCV 86 80 - 100 fL    MCH 27.0 26.0 - 34.0 pg    MCHC 31.3 (L) 32.0 - 36.0 g/dL    RDW 15.5 (H) 11.5 - 14.5 %    Platelets 297 150 - 450 x10*3/uL    MPV 9.6 7.5 - 11.5 fL    Neutrophils % 78.7 40.0 - 80.0 %    Immature Granulocytes %, Automated 0.3 0.0 - 0.9 %    Lymphocytes % 11.3 13.0 - 44.0 %    Monocytes % 8.4 2.0 - 10.0 %    Eosinophils % 1.0 0.0 - 6.0 %    Basophils % 0.3 0.0 - 2.0 %    Neutrophils Absolute 9.07 (H) 1.60 - 5.50 x10*3/uL    Immature Granulocytes Absolute, Automated 0.04 0.00 - 0.50 x10*3/uL    Lymphocytes Absolute 1.31 0.80 - 3.00 x10*3/uL    Monocytes Absolute 0.97 (H) 0.05 - 0.80 x10*3/uL    Eosinophils Absolute 0.12 0.00 - 0.40 x10*3/uL    Basophils Absolute 0.04 0.00 - 0.10 x10*3/uL   Comprehensive Metabolic Panel   Result Value Ref Range    Glucose 276 (H) 74 - 99 mg/dL    Sodium 138 136 - 145 mmol/L    Potassium 4.2 3.5 - 5.3 mmol/L    Chloride 102 98 - 107 mmol/L    Bicarbonate 31  21 - 32 mmol/L    Anion Gap 9 (L) 10 - 20 mmol/L    Urea Nitrogen 14 6 - 23 mg/dL    Creatinine 0.71 0.50 - 1.05 mg/dL    eGFR 90 >60 mL/min/1.73m*2    Calcium 9.1 8.6 - 10.3 mg/dL    Albumin 4.0 3.4 - 5.0 g/dL    Alkaline Phosphatase 113 33 - 136 U/L    Total Protein 7.2 6.4 - 8.2 g/dL    AST 33 9 - 39 U/L    Bilirubin, Total 0.4 0.0 - 1.2 mg/dL    ALT 45 7 - 45 U/L   Magnesium   Result Value Ref Range    Magnesium 1.70 1.60 - 2.40 mg/dL   B-type natriuretic peptide   Result Value Ref Range     (H) 0 - 99 pg/mL   Coagulation Screen   Result Value Ref Range    Protime 39.0 (H) 9.8 - 12.8 seconds    INR 3.4 (H) 0.9 - 1.1    aPTT 42 (H) 27 - 38 seconds   Troponin I, High Sensitivity, Initial   Result Value Ref Range    Troponin I, High Sensitivity 11 0 - 13 ng/L                   Lutz Coma Scale Score: 15                  Patient History   Past Medical History:   Diagnosis Date   • Asthma    • Diabetes mellitus (CMS/HCC)    • Pulmonary embolus (CMS/HCC)      Past Surgical History:   Procedure Laterality Date   • EYE SURGERY  03/19/2015    Eye Surgery   • MR HEAD ANGIO WO IV CONTRAST  5/18/2022    MR HEAD ANGIO WO IV CONTRAST 5/18/2022 [facility]   • MR NECK ANGIO WO IV CONTRAST  5/18/2022    MR NECK ANGIO WO IV CONTRAST 5/18/2022 [facility]   • TONSILLECTOMY  09/28/2017    Tonsillectomy   • TUBAL LIGATION  09/28/2017    Tubal Ligation     No family history on file.  Social History     Tobacco Use   • Smoking status: Never   • Smokeless tobacco: Never   Substance Use Topics   • Alcohol use: Not on file   • Drug use: Not on file       Physical Exam   ED Triage Vitals [09/30/23 1121]   Temp Heart Rate Resp BP   37.2 °C (99 °F) 68 20 167/70      SpO2 Temp Source Heart Rate Source Patient Position   96 % Oral -- Sitting      BP Location FiO2 (%)     Left arm --       Physical Exam    ED Course & MDM   Diagnoses as of 09/30/23 1656   CAP (community acquired pneumonia) due to Chlamydia species       Medical  Decision Making  73-year-old female arrives with cough for 2 days yellow phlegm and shortness of breath.  She denies fever but is in obvious distress with some stomach breathing.  She is a diabetic.  Glucose was 276 from the lab she has an elevated white count of 11.6 with a neutrophilic shift.  Sepsis orders were added once these labs were obtained and I ordered ceftriaxone as she has an amoxicillin allergy but it is listed as only diarrhea and she feels comfortable taking that.  In addition I ordered Zithromax which is not listed as an allergy for her.  She is also receiving a DuoNeb.  Because of the beta natruretic peptide being over 300 with pitting edema and JVD as well as pulmonary edema on the chest x-ray I ordered 40 mg of Lasix IV.  I spoke directly to her internal medicine physician who was kind enough to admit and requested inpatient therapy.        Procedure  ECG 12 lead    Performed by: Joshua Parekh MD  Authorized by: Joshua Parekh MD    ECG reviewed by ED Physician in the absence of a cardiologist: yes    Previous ECG:     Previous ECG:  Compared to current  Interpretation:     Interpretation: normal    Rate:     ECG rate assessment: normal    Rhythm:     Rhythm: sinus rhythm    Ectopy:     Ectopy: none    QRS:     QRS axis:  Normal  ST segments:     ST segments:  Normal  T waves:     T waves: normal         Joshua Parekh MD  09/30/23 1100

## 2023-09-30 NOTE — H&P
History of Present Illness:   History Present Illness:  Reason for surgery: cataract left eye   HPI:    Patient w/hx of cataract left eye presenting for cataract extraction with intraocular lens implantation of the left eye. Feels well, no health changes since prior  physician exam.     Allergies:        Allergies:  ·  amoxicillin : Unknown  ·  Augmentin : Unknown  ·  hydrochlorothiazide : Unknown  ·  lisinopril : Unknown  ·  losartan : Unknown  ·  Prinivil : Unknown    Home Medication Review:   Home Medications Reviewed: yes     Impression/Procedure:   ·  Impression and Planned Procedure: cataract left eye presenting for cataract extraction with intraocular lens implantation of the left eye.       ERAS (Enhanced Recovery After Surgery):  ·  ERAS Patient: no       Vital Signs:  Temperature C: 36.2 degrees C   Temperature F: 97.1 degrees F   Heart Rate: 57 beats per minute   Respiratory Rate: 16 breath per minute   Blood Pressure Systolic: 124 mm/Hg   Blood Pressure Diastolic: 58 mm/Hg     Physical Exam by System:    Constitutional: Well developed, awake/alert, no distress,  alert and cooperative   Eyes: Clear sclera   ENMT: mucous membranes moist, no apparent injury,  further exam per anesthesia   Respiratory/Thorax: Unlabored respirations, symmetric  chest expansion without stridor, auscultation per anesthesia   Cardiovascular: Auscultation per anesthesia   Gastrointestinal: abdomen nondistended   Extremities: no cyanosis, contusions or wounds   Neurological: alert and oriented x3   Psychological: Appropriate mood and behavior   Skin: Warm and dry     Consent:   COVID-19 Consent:  ·  COVID-19 Risk Consent Surgeon has reviewed key risks related to the risk of natalie COVID-19 and if they contract COVID-19 what the risks are.     Attestation:   Note Completion:  I am a:  Resident/Fellow   Attending Attestation I saw and evaluated the patient.  I personally obtained the key and critical portions of the history  and physical exam or was physically present for key and  critical portions performed by the resident/fellow. I reviewed the resident/fellow?s documentation and discussed the patient with the resident/fellow.  I agree with the resident/fellow?s medical decision making as documented in the note.     I personally evaluated the patient on 28-Sep-2023         Electronic Signatures:  Renzo Cross (Resident))  (Signed 28-Sep-2023 08:24)   Authored: History of Present Illness, Allergies, Home  Medication Review, Impression/Procedure, ERAS, Physical Exam, Consent, Note Completion  Elzbieta Salcido)  (Signed 28-Sep-2023 09:15)   Authored: Note Completion   Co-Signer: History of Present Illness, Allergies, Home Medication Review, Impression/Procedure, ERAS, Physical Exam, Consent, Note Completion      Last Updated: 28-Sep-2023 09:15 by Elzbieta Salcido)

## 2023-10-01 LAB
BASOPHILS # BLD AUTO: 0.05 X10*3/UL (ref 0–0.1)
BASOPHILS NFR BLD AUTO: 0.5 %
CARDIAC TROPONIN I PNL SERPL HS: 13 NG/L (ref 0–13)
EOSINOPHIL # BLD AUTO: 0.16 X10*3/UL (ref 0–0.4)
EOSINOPHIL NFR BLD AUTO: 1.4 %
ERYTHROCYTE [DISTWIDTH] IN BLOOD BY AUTOMATED COUNT: 15.3 % (ref 11.5–14.5)
GLUCOSE BLD MANUAL STRIP-MCNC: 317 MG/DL (ref 74–99)
GLUCOSE BLD MANUAL STRIP-MCNC: 87 MG/DL (ref 74–99)
HCT VFR BLD AUTO: 38.7 % (ref 36–46)
HGB BLD-MCNC: 12.4 G/DL (ref 12–16)
IMM GRANULOCYTES # BLD AUTO: 0.05 X10*3/UL (ref 0–0.5)
IMM GRANULOCYTES NFR BLD AUTO: 0.5 % (ref 0–0.9)
INR PPP: 3.1 (ref 0.9–1.1)
LACTATE SERPL-SCNC: 1.2 MMOL/L (ref 0.4–2)
LYMPHOCYTES # BLD AUTO: 1.39 X10*3/UL (ref 0.8–3)
LYMPHOCYTES NFR BLD AUTO: 12.6 %
MCH RBC QN AUTO: 27.1 PG (ref 26–34)
MCHC RBC AUTO-ENTMCNC: 32 G/DL (ref 32–36)
MCV RBC AUTO: 85 FL (ref 80–100)
MONOCYTES # BLD AUTO: 0.94 X10*3/UL (ref 0.05–0.8)
MONOCYTES NFR BLD AUTO: 8.5 %
NEUTROPHILS # BLD AUTO: 8.47 X10*3/UL (ref 1.6–5.5)
NEUTROPHILS NFR BLD AUTO: 76.5 %
NRBC BLD-RTO: 0 /100 WBCS (ref 0–0)
PLATELET # BLD AUTO: 275 X10*3/UL (ref 150–450)
PMV BLD AUTO: 9.9 FL (ref 7.5–11.5)
PROTHROMBIN TIME: 35.5 SECONDS (ref 9.8–12.8)
RBC # BLD AUTO: 4.58 X10*6/UL (ref 4–5.2)
WBC # BLD AUTO: 11.1 X10*3/UL (ref 4.4–11.3)

## 2023-10-01 PROCEDURE — 2500000004 HC RX 250 GENERAL PHARMACY W/ HCPCS (ALT 636 FOR OP/ED)

## 2023-10-01 PROCEDURE — 82947 ASSAY GLUCOSE BLOOD QUANT: CPT

## 2023-10-01 PROCEDURE — 2550000001 HC RX 255 CONTRASTS: Performed by: INTERNAL MEDICINE

## 2023-10-01 PROCEDURE — 83605 ASSAY OF LACTIC ACID: CPT | Performed by: EMERGENCY MEDICINE

## 2023-10-01 PROCEDURE — 2500000002 HC RX 250 W HCPCS SELF ADMINISTERED DRUGS (ALT 637 FOR MEDICARE OP, ALT 636 FOR OP/ED): Performed by: INTERNAL MEDICINE

## 2023-10-01 PROCEDURE — 85025 COMPLETE CBC W/AUTO DIFF WBC: CPT | Performed by: EMERGENCY MEDICINE

## 2023-10-01 PROCEDURE — 84484 ASSAY OF TROPONIN QUANT: CPT | Performed by: EMERGENCY MEDICINE

## 2023-10-01 PROCEDURE — 36415 COLL VENOUS BLD VENIPUNCTURE: CPT | Performed by: INTERNAL MEDICINE

## 2023-10-01 PROCEDURE — 94640 AIRWAY INHALATION TREATMENT: CPT

## 2023-10-01 PROCEDURE — 85610 PROTHROMBIN TIME: CPT | Performed by: INTERNAL MEDICINE

## 2023-10-01 PROCEDURE — 87040 BLOOD CULTURE FOR BACTERIA: CPT | Mod: AHULAB,CMCLAB | Performed by: EMERGENCY MEDICINE

## 2023-10-01 PROCEDURE — 99253 IP/OBS CNSLTJ NEW/EST LOW 45: CPT | Performed by: INTERNAL MEDICINE

## 2023-10-01 PROCEDURE — 2500000001 HC RX 250 WO HCPCS SELF ADMINISTERED DRUGS (ALT 637 FOR MEDICARE OP): Performed by: INTERNAL MEDICINE

## 2023-10-01 PROCEDURE — 94760 N-INVAS EAR/PLS OXIMETRY 1: CPT

## 2023-10-01 PROCEDURE — 87075 CULTR BACTERIA EXCEPT BLOOD: CPT | Mod: 59,AHULAB,CMCLAB | Performed by: EMERGENCY MEDICINE

## 2023-10-01 PROCEDURE — 36415 COLL VENOUS BLD VENIPUNCTURE: CPT | Performed by: EMERGENCY MEDICINE

## 2023-10-01 PROCEDURE — 1100000001 HC PRIVATE ROOM DAILY

## 2023-10-01 PROCEDURE — 71260 CT THORAX DX C+: CPT | Performed by: RADIOLOGY

## 2023-10-01 PROCEDURE — 2500000004 HC RX 250 GENERAL PHARMACY W/ HCPCS (ALT 636 FOR OP/ED): Performed by: INTERNAL MEDICINE

## 2023-10-01 RX ORDER — FUROSEMIDE 10 MG/ML
80 INJECTION INTRAMUSCULAR; INTRAVENOUS ONCE
Status: COMPLETED | OUTPATIENT
Start: 2023-10-01 | End: 2023-10-01

## 2023-10-01 RX ORDER — DEXTROSE MONOHYDRATE 100 MG/ML
0.3 INJECTION, SOLUTION INTRAVENOUS ONCE AS NEEDED
Status: DISCONTINUED | OUTPATIENT
Start: 2023-10-01 | End: 2023-10-03

## 2023-10-01 RX ORDER — DEXTROSE MONOHYDRATE 100 MG/ML
0.3 INJECTION, SOLUTION INTRAVENOUS ONCE AS NEEDED
Status: CANCELLED | OUTPATIENT
Start: 2023-10-01

## 2023-10-01 RX ORDER — DEXTROSE 50 % IN WATER (D50W) INTRAVENOUS SYRINGE
25
Status: DISCONTINUED | OUTPATIENT
Start: 2023-10-01 | End: 2023-10-03

## 2023-10-01 RX ORDER — DEXTROSE 50 % IN WATER (D50W) INTRAVENOUS SYRINGE
25
Status: CANCELLED | OUTPATIENT
Start: 2023-10-01

## 2023-10-01 RX ORDER — FUROSEMIDE 10 MG/ML
80 INJECTION INTRAMUSCULAR; INTRAVENOUS EVERY 12 HOURS
Status: DISCONTINUED | OUTPATIENT
Start: 2023-10-02 | End: 2023-10-04

## 2023-10-01 RX ORDER — DEXTROSE 50 % IN WATER (D50W) INTRAVENOUS SYRINGE
25
Status: DISCONTINUED | OUTPATIENT
Start: 2023-10-01 | End: 2023-10-01

## 2023-10-01 RX ADMIN — IPRATROPIUM BROMIDE AND ALBUTEROL SULFATE 3 ML: 2.5; .5 SOLUTION RESPIRATORY (INHALATION) at 13:19

## 2023-10-01 RX ADMIN — IOHEXOL 50 ML: 350 INJECTION, SOLUTION INTRAVENOUS at 12:40

## 2023-10-01 RX ADMIN — DILTIAZEM HYDROCHLORIDE 180 MG: 180 CAPSULE, EXTENDED RELEASE ORAL at 09:00

## 2023-10-01 RX ADMIN — METOPROLOL TARTRATE 50 MG: 25 TABLET, FILM COATED ORAL at 21:11

## 2023-10-01 RX ADMIN — PRIMIDONE 50 MG: 50 TABLET ORAL at 09:00

## 2023-10-01 RX ADMIN — INSULIN HUMAN 45 UNITS: 100 INJECTION, SUSPENSION SUBCUTANEOUS at 12:30

## 2023-10-01 RX ADMIN — CEFTRIAXONE SODIUM 1 G: 1 INJECTION, SOLUTION INTRAVENOUS at 16:41

## 2023-10-01 RX ADMIN — GABAPENTIN 600 MG: 300 CAPSULE ORAL at 09:00

## 2023-10-01 RX ADMIN — INSULIN HUMAN 45 UNITS: 100 INJECTION, SUSPENSION SUBCUTANEOUS at 18:12

## 2023-10-01 RX ADMIN — FUROSEMIDE 80 MG: 10 INJECTION, SOLUTION INTRAVENOUS at 16:46

## 2023-10-01 RX ADMIN — TOPIRAMATE 50 MG: 25 TABLET, FILM COATED ORAL at 09:00

## 2023-10-01 RX ADMIN — PRIMIDONE 50 MG: 50 TABLET ORAL at 21:11

## 2023-10-01 RX ADMIN — INSULIN LISPRO 3 UNITS: 100 INJECTION, SOLUTION INTRAVENOUS; SUBCUTANEOUS at 18:00

## 2023-10-01 RX ADMIN — HYDRALAZINE HYDROCHLORIDE 100 MG: 50 TABLET ORAL at 21:11

## 2023-10-01 RX ADMIN — GABAPENTIN 600 MG: 300 CAPSULE ORAL at 16:39

## 2023-10-01 RX ADMIN — AZITHROMYCIN MONOHYDRATE 500 MG: 500 TABLET ORAL at 16:40

## 2023-10-01 RX ADMIN — TOPIRAMATE 50 MG: 25 TABLET, FILM COATED ORAL at 21:11

## 2023-10-01 RX ADMIN — BRIMONIDINE TARTRATE 1 DROP: 2 SOLUTION/ DROPS OPHTHALMIC at 21:09

## 2023-10-01 RX ADMIN — IPRATROPIUM BROMIDE AND ALBUTEROL SULFATE 3 ML: 2.5; .5 SOLUTION RESPIRATORY (INHALATION) at 06:13

## 2023-10-01 RX ADMIN — MONTELUKAST 10 MG: 10 TABLET, FILM COATED ORAL at 21:11

## 2023-10-01 RX ADMIN — CLONIDINE HYDROCHLORIDE 0.1 MG: 0.1 TABLET ORAL at 09:00

## 2023-10-01 RX ADMIN — POLYETHYLENE GLYCOL (3350) 17 G: 17 POWDER, FOR SOLUTION ORAL at 09:00

## 2023-10-01 RX ADMIN — BRIMONIDINE TARTRATE 1 DROP: 2 SOLUTION/ DROPS OPHTHALMIC at 09:00

## 2023-10-01 RX ADMIN — GABAPENTIN 600 MG: 300 CAPSULE ORAL at 21:11

## 2023-10-01 RX ADMIN — INSULIN LISPRO 4 UNITS: 100 INJECTION, SOLUTION INTRAVENOUS; SUBCUTANEOUS at 12:00

## 2023-10-01 RX ADMIN — HYDRALAZINE HYDROCHLORIDE 100 MG: 50 TABLET ORAL at 09:00

## 2023-10-01 RX ADMIN — CLONIDINE HYDROCHLORIDE 0.1 MG: 0.1 TABLET ORAL at 21:11

## 2023-10-01 RX ADMIN — HYDRALAZINE HYDROCHLORIDE 100 MG: 50 TABLET ORAL at 16:40

## 2023-10-01 RX ADMIN — METOPROLOL TARTRATE 50 MG: 25 TABLET, FILM COATED ORAL at 09:00

## 2023-10-01 RX ADMIN — BENZONATATE 100 MG: 100 CAPSULE ORAL at 11:20

## 2023-10-01 RX ADMIN — PANTOPRAZOLE SODIUM 40 MG: 40 TABLET, DELAYED RELEASE ORAL at 06:57

## 2023-10-01 ASSESSMENT — ENCOUNTER SYMPTOMS
HEMATOLOGIC/LYMPHATIC NEGATIVE: 1
EYES NEGATIVE: 1
RHINORRHEA: 1
FATIGUE: 1
GASTROINTESTINAL NEGATIVE: 1
SHORTNESS OF BREATH: 1
SINUS PAIN: 1
NEUROLOGICAL NEGATIVE: 1
PSYCHIATRIC NEGATIVE: 1
FEVER: 1
CHEST TIGHTNESS: 1
ALLERGIC/IMMUNOLOGIC NEGATIVE: 1
DIAPHORESIS: 1
ENDOCRINE NEGATIVE: 1
APPETITE CHANGE: 0
PALPITATIONS: 0
ACTIVITY CHANGE: 0
CHILLS: 1
SINUS PRESSURE: 1

## 2023-10-01 ASSESSMENT — COGNITIVE AND FUNCTIONAL STATUS - GENERAL
DAILY ACTIVITIY SCORE: 24
MOBILITY SCORE: 24

## 2023-10-01 NOTE — H&P
History Of Present Illness  Lia Jeong is a 73 y.o. female presenting with she is known to have multiple medical problems including pulmonary embolism history of DVT on long-term warfarin history of diabetes seeing endocrinology history of hypertension well-controlled dose.  Cardiologist as an outpatient history of asthma never a smoker history of COPD hyperlipidemia obstructive sleep apnea.  And diabetic neuropathy, she does take care of her grandchildren, she started having severe cough cold and she thought it was a sinus, but yesterday she got worse, she was short of breath, she came to the emergency department, in the emergency room physician had imaging done, she was diagnosed with pneumonia and congestive heart failure, as she was noted to be having leg edema, she does have some chronic leg edema, she is being admitted here for further management.  She is on room air now she feels much better.  Past medical history as mentioned above.       Past Medical History  She has a past medical history of Asthma, Diabetes mellitus (CMS/[facility]), and Pulmonary embolus (CMS/[facility]).    Surgical History  She has a past surgical history that includes Eye surgery (03/19/2015); Tonsillectomy (09/28/2017); Tubal ligation (09/28/2017); MR angio head wo IV contrast (5/18/2022); and MR angio neck wo IV contrast (5/18/2022).     Social History  She reports that she has never smoked. She has never used smokeless tobacco. No history on file for alcohol use and drug use.    Family History  No family history on file.     Allergies  Amoxicillin, Hydrochlorothiazide, Lisinopril, and Losartan    Review of Systems   Constitutional:  Positive for chills, diaphoresis, fatigue and fever. Negative for activity change and appetite change.   HENT:  Positive for congestion, rhinorrhea, sinus pressure and sinus pain.    Eyes: Negative.    Respiratory:  Positive for chest tightness and shortness of breath.    Cardiovascular:  Positive for leg  "swelling. Negative for chest pain and palpitations.   Gastrointestinal: Negative.    Endocrine: Negative.    Genitourinary: Negative.    Allergic/Immunologic: Negative.    Neurological: Negative.    Hematological: Negative.    Psychiatric/Behavioral: Negative.          Physical Exam  Constitutional:       Appearance: Normal appearance. She is normal weight.   HENT:      Head: Normocephalic and atraumatic.   Cardiovascular:      Rate and Rhythm: Regular rhythm. Tachycardia present.   Pulmonary:      Breath sounds: Wheezing present.   Abdominal:      General: Bowel sounds are normal.      Palpations: Abdomen is soft.   Musculoskeletal:         General: Swelling present.      Cervical back: Normal range of motion and neck supple.      Right lower leg: Edema present.      Left lower leg: Edema present.   Neurological:      General: No focal deficit present.      Mental Status: She is alert and oriented to person, place, and time.   Psychiatric:         Mood and Affect: Mood normal.          Last Recorded Vitals  Blood pressure 139/83, pulse 79, temperature 36.1 °C (97 °F), resp. rate 18, height 1.651 m (5' 5\"), weight 99.8 kg (220 lb), SpO2 91 %.  atorvastatin, 80 mg, oral, Daily  azithromycin, 500 mg, oral, q24h KYLE  brimonidine, 1 drop, Both Eyes, BID  cefTRIAXone, 1 g, intravenous, q24h  cloNIDine, 0.1 mg, oral, q12h KYLE  dilTIAZem ER, 180 mg, oral, Daily  gabapentin, 600 mg, oral, TID  hydrALAZINE, 100 mg, oral, TID  insulin lispro, 0-5 Units, subcutaneous, TID with meals  insulin NPH (Isophane), 35 Units, subcutaneous, BID AC  ipratropium-albuteroL, 3 mL, nebulization, TID  metFORMIN, 1,000 mg, oral, BID with meals  metoprolol tartrate, 50 mg, oral, BID  montelukast, 10 mg, oral, Nightly  pantoprazole, 40 mg, oral, Daily before breakfast  polyethylene glycol, 17 g, oral, Daily  primidone, 50 mg, oral, BID  topiramate, 50 mg, oral, BID       Results for orders placed or performed during the hospital encounter of " 09/30/23 (from the past 96 hour(s))   SARS-CoV-2 RT PCR   Result Value Ref Range    Coronavirus 2019, PCR Not Detected Not Detected   Influenza A, and B PCR   Result Value Ref Range    Flu A Result Not Detected Not Detected    Flu B Result Not Detected Not Detected   RSV PCR   Result Value Ref Range    RSV PCR Not Detected Not Detected   CBC and Auto Differential   Result Value Ref Range    WBC 11.6 (H) 4.4 - 11.3 x10*3/uL    nRBC 0.0 0.0 - 0.0 /100 WBCs    RBC 4.77 4.00 - 5.20 x10*6/uL    Hemoglobin 12.9 12.0 - 16.0 g/dL    Hematocrit 41.2 36.0 - 46.0 %    MCV 86 80 - 100 fL    MCH 27.0 26.0 - 34.0 pg    MCHC 31.3 (L) 32.0 - 36.0 g/dL    RDW 15.5 (H) 11.5 - 14.5 %    Platelets 297 150 - 450 x10*3/uL    MPV 9.6 7.5 - 11.5 fL    Neutrophils % 78.7 40.0 - 80.0 %    Immature Granulocytes %, Automated 0.3 0.0 - 0.9 %    Lymphocytes % 11.3 13.0 - 44.0 %    Monocytes % 8.4 2.0 - 10.0 %    Eosinophils % 1.0 0.0 - 6.0 %    Basophils % 0.3 0.0 - 2.0 %    Neutrophils Absolute 9.07 (H) 1.60 - 5.50 x10*3/uL    Immature Granulocytes Absolute, Automated 0.04 0.00 - 0.50 x10*3/uL    Lymphocytes Absolute 1.31 0.80 - 3.00 x10*3/uL    Monocytes Absolute 0.97 (H) 0.05 - 0.80 x10*3/uL    Eosinophils Absolute 0.12 0.00 - 0.40 x10*3/uL    Basophils Absolute 0.04 0.00 - 0.10 x10*3/uL   Comprehensive Metabolic Panel   Result Value Ref Range    Glucose 276 (H) 74 - 99 mg/dL    Sodium 138 136 - 145 mmol/L    Potassium 4.2 3.5 - 5.3 mmol/L    Chloride 102 98 - 107 mmol/L    Bicarbonate 31 21 - 32 mmol/L    Anion Gap 9 (L) 10 - 20 mmol/L    Urea Nitrogen 14 6 - 23 mg/dL    Creatinine 0.71 0.50 - 1.05 mg/dL    eGFR 90 >60 mL/min/1.73m*2    Calcium 9.1 8.6 - 10.3 mg/dL    Albumin 4.0 3.4 - 5.0 g/dL    Alkaline Phosphatase 113 33 - 136 U/L    Total Protein 7.2 6.4 - 8.2 g/dL    AST 33 9 - 39 U/L    Bilirubin, Total 0.4 0.0 - 1.2 mg/dL    ALT 45 7 - 45 U/L   Magnesium   Result Value Ref Range    Magnesium 1.70 1.60 - 2.40 mg/dL   B-type  natriuretic peptide   Result Value Ref Range     (H) 0 - 99 pg/mL   Coagulation Screen   Result Value Ref Range    Protime 39.0 (H) 9.8 - 12.8 seconds    INR 3.4 (H) 0.9 - 1.1    aPTT 42 (H) 27 - 38 seconds   Troponin I, High Sensitivity, Initial   Result Value Ref Range    Troponin I, High Sensitivity 11 0 - 13 ng/L   Urinalysis with Reflex Microscopic   Result Value Ref Range    Color, Urine Colorless (N) Straw, Yellow    Appearance, Urine Clear Clear    Specific Gravity, Urine 1.004 (N) 1.005 - 1.035    pH, Urine 7.0 5.0, 5.5, 6.0, 6.5, 7.0, 7.5, 8.0    Protein, Urine NEGATIVE NEGATIVE mg/dL    Glucose, Urine 150 (2+) (A) NEGATIVE mg/dL    Blood, Urine NEGATIVE NEGATIVE    Ketones, Urine NEGATIVE NEGATIVE mg/dL    Bilirubin, Urine NEGATIVE NEGATIVE    Urobilinogen, Urine <2.0 <2.0 mg/dL    Nitrite, Urine NEGATIVE NEGATIVE    Leukocyte Esterase, Urine MODERATE (2+) (A) NEGATIVE   Urinalysis Microscopic Only   Result Value Ref Range    WBC, Urine 1-5 1-5, NONE /HPF    RBC, Urine NONE NONE, 1-2, 3-5 /HPF    Squamous Epithelial Cells, Urine NONE Reference range not established. /HPF   Troponin, High Sensitivity, 1 Hour   Result Value Ref Range    Troponin I, High Sensitivity 13 0 - 13 ng/L   CBC and Auto Differential   Result Value Ref Range    WBC 11.1 4.4 - 11.3 x10*3/uL    nRBC 0.0 0.0 - 0.0 /100 WBCs    RBC 4.58 4.00 - 5.20 x10*6/uL    Hemoglobin 12.4 12.0 - 16.0 g/dL    Hematocrit 38.7 36.0 - 46.0 %    MCV 85 80 - 100 fL    MCH 27.1 26.0 - 34.0 pg    MCHC 32.0 32.0 - 36.0 g/dL    RDW 15.3 (H) 11.5 - 14.5 %    Platelets 275 150 - 450 x10*3/uL    MPV 9.9 7.5 - 11.5 fL    Neutrophils % 76.5 40.0 - 80.0 %    Immature Granulocytes %, Automated 0.5 0.0 - 0.9 %    Lymphocytes % 12.6 13.0 - 44.0 %    Monocytes % 8.5 2.0 - 10.0 %    Eosinophils % 1.4 0.0 - 6.0 %    Basophils % 0.5 0.0 - 2.0 %    Neutrophils Absolute 8.47 (H) 1.60 - 5.50 x10*3/uL    Immature Granulocytes Absolute, Automated 0.05 0.00 - 0.50  x10*3/uL    Lymphocytes Absolute 1.39 0.80 - 3.00 x10*3/uL    Monocytes Absolute 0.94 (H) 0.05 - 0.80 x10*3/uL    Eosinophils Absolute 0.16 0.00 - 0.40 x10*3/uL    Basophils Absolute 0.05 0.00 - 0.10 x10*3/uL   Lactate   Result Value Ref Range    Lactate 1.2 0.4 - 2.0 mmol/L   POCT GLUCOSE   Result Value Ref Range    POCT Glucose 317 (H) 74 - 99 mg/dL      RXR chest 1 view    Result Date: 9/30/2023  Interpreted By:  Elisa Shaffer, STUDY: XR CHEST 1 VIEW;  9/30/2023 4:45 pm   INDICATION: Signs/Symptoms:cough sputum high wbc.   COMPARISON: Chest x-ray 10/25/2021   ACCESSION NUMBER(S): [id number]   ORDERING CLINICIAN: SOL GALLEGOS   FINDINGS: Study slightly limited by patient rotation.   CARDIOMEDIASTINAL SILHOUETTE: Cardiomediastinal silhouette is borderline enlarged but stable. Atherosclerotic calcification of the aorta.   LUNGS: No consolidation, pleural effusion or pneumothorax. Mild diffuse interstitial prominence.   ABDOMEN: No remarkable upper abdominal findings.   BONES: Multilevel degenerative changes of the spine. Left shoulder osteoarthrosis.       Mild diffuse interstitial prominence could be chronic or relate to component of developing interstitial edema. Correlate clinically.   MACRO: None   Signed by: Elisa Shaffer 9/30/2023 5:34 PM Dictation workstation:   [id number]    CT head wo IV contrast    Result Date: 9/30/2023  Interpreted By:  Elisa Shaffer, STUDY: CT HEAD WO IV CONTRAST;  9/30/2023 4:27 pm   INDICATION: anticoagulated headache.   COMPARISON: CT scan of the head 05/08/2022.   ACCESSION NUMBER(S): [id number]   ORDERING CLINICIAN: SOL GALLEGOS   TECHNIQUE: Axial noncontrast CT images of the head.   FINDINGS: There is significant artifact centrally limiting evaluation.   BRAIN PARENCHYMA: Gray-white matter interfaces are preserved. No mass, mass effect or midline shift. Mild deep and periventricular white matter hypodensities are nonspecific, but favored to represent chronic small vessel  ischemic changes.   HEMORRHAGE: No acute intracranial hemorrhage. VENTRICLES and EXTRA-AXIAL SPACES: Mild volume loss with prominence of the ventricles and sulci. EXTRACRANIAL SOFT TISSUES: Within normal limits. PARANASAL SINUSES/MASTOIDS: The visualized paranasal sinuses and mastoid air cells are aerated. CALVARIUM: No depressed skull fracture. No destructive osseous lesion.   OTHER FINDINGS: Atherosclerotic calcification of the carotid siphons and vertebral arteries.       No acute intracranial abnormality.   Chronic changes as described above.   MACRO: None   Signed by: Elisa Shaffer 9/30/2023 5:32 PM Dictation workstation:   [id number]      Diabetic neuropathy     Assessment/Plan   Principal Problem:    CAP (community acquired pneumonia) due to Chlamydia species      She is admitted as community-acquired pneumonia, she is on pneumonia protocol, she is already better, she is on room air, we will continue with the antibiotics,.  History of PE she is on warfarin it was on hold as INR is 3.4 INR is pending today will receive today.  She was also admitted with heart failure, leg edema, and will do echocardiogram and cardiology consult, she is on IV Lasix, she does take Demadex at home,.  Diabetes, will continue with the insulin, with the sliding scale.  Hypertension we will continue with the same.  Hyperlipidemia same  DVT prophylaxis with warfarin.  Most likely discharge planning tomorrow depending on the cardiology opinion as she is already doing well.         I spent 75 minutes in the professional and overall care of this patient.      Ghazal Gray MD

## 2023-10-01 NOTE — OP NOTE
Post Operative Note:     PreOp Diagnosis: Combined cataract - Left eye   Post-Procedure Diagnosis: Combined cataract - Left  eye   Procedure: Phacoemulsification of cataract with intraocular  lens implant - LEFT Eye   Surgeon: Elzbieta Salcido MD   Resident/Fellow/Other Assistant: Renzo Cross MD   Estimated Blood Loss (mL): none   Specimen: no   Complications: None   Findings: As above     Operative Report Dictated:  Dictation: not applicable - note contains Operative  Report   Note Recipients: Ghazal Gray MD   Operative Report:    Patient name: Lia Jeong  YOB: 1950  MRN: [id number]  Date of surgery: 09/28/2023  Preoperative diagnosis: Combined cataract of the LEFT eye  Postoperative diagnosis: Combined cataract of the LEFT eye  Procedure: Phacoemulsification of cataract with insertion of intraocular lens, LEFT eye  Surgeon: Elzbieta Salcido MD  Resident: Renzo Cross MD  Anesthesia: MAC  Complications: None  Lens Inserted: Terry ACU0T0 with a power of +11.50 D, serial # [id number]. Exp: 06/15/2024.     Procedure description: After the risks, benefits, and alternatives of the planned procedure were discussed with the patient, informed consent was obtained at the preoperative evaluation. On the day of surgery, there were no updates to the consent form  and any patient questions were answered. The patient was correctly identified in the preoperative area and the LEFT eye was marked as the operative eye. Dilating drops were instilled into the LEFT eye in the preoperative area. The patient was then taken  back to the operating room and placed under sedation. The patient was prepped and draped in the standard, sterile ophthalmic fashion in preparation for intraocular surgery.    A lid speculum was placed into the LEFT palpebral fissure and the operating microscope was brought into position. A paracentesis was made in inferotemporal cornea at the limbus with a 1.0mm side port  blade using a cotton tipped applicator to provide countertraction.  Intracameral lidocaine was injected into the anterior chamber followed by Viscoat viscoelastic. Using 0.12 forceps to stabilize the globe, a 2.4mm keratome blade was used to create a limbal clear-corneal incision superotemporally. A bent-needle cystotome  and Utrata forceps were used to create a continuous curvilinear capsulorrhexis. Balanced salt saline solution on a blunt-tipped cannula was used to achieve hydrodissection.    A phacoemulsification device and a [place] spatula were used to remove the nucleus using a divide-and-conquer technique. Residual cortical material was removed with the irrigation and aspiration handpiece. Provisc viscoelastic was then injected into  the eye to reform the anterior chamber and to open the capsular bag. The posterior capsule was inspected and found to be clean and intact. The intraocular lens, an Terry ACU0T0 with a power of +11.50 diopters was injected into the capsular bag. A lens  positioner was used to center the lens and ensure good position within the bag. The remaining viscoelastic was removed using irrigation and aspiration. Balanced salt saline solution on a blunt-tipped cannula was then used to hydrate the corneal stroma  adjacent to the main wound and paracentesis site as well as to reform the anterior chamber.  The wound was checked and found to be watertight with normal intraocular pressure verified using digital palpation. At the conclusion of the case, a well-centered  intraocular lens with a good red reflex was observed. Tetracaine, betadine, BSS, and prednisolone acetate drops were instilled into the LEFT eye. The lid speculum and drapes were removed. . A clear plastic shield was then taped over the eye. The patient  was taken to the recovery room in stable condition, having tolerated the procedure well. There were no complications.      Attestation:   Note Completion:  Attending Attestation I  was present for the entire procedure   Comments/ Additional Findings    I performed the procedure with resident assistance        Electronic Signatures:  Elzbieta Salcido)  (Signed 28-Sep-2023 14:56)   Authored: Post Operative Note, Note Completion      Last Updated: 28-Sep-2023 14:56 by Elzbieta Salcido)

## 2023-10-01 NOTE — CONSULTS
Consults  History Of Present Illness:    Lia Jeong is a 73 y.o. female with a past medical history of pulmonary embolism (1994) on coumadin therapy, asthma, hypertension, hyperlipidemia, diabetes mellitus type 2, fibromyalgia, tremor, cataract surgery (9/23), Diastolic HF, presents with complaints of coughing up mucus and shortness of breath.  Initial EKG reveals sinus rhythm heart rate 69, chest x-ray reveals interstitial edema, WBC 11.1, urinalysis+ for moderate leukocytes,  was 43 (5/22), troponin negative x1.  Patient received IV antibiotic therapy, DuoNebs, and Lasix 40 mg IV x1> reports suboptimal urinary response. The decision was made for inpatient admission for further evaluation and treatment of PNA and heart failure.  Cardiology consulted for further evaluation of heart failure.    Evaluated patient today here at [facility], patient reports that she is here for further evaluation of progressive dyspnea, headache, nasal/chest congestion, weakness, coughing up yellow sputum x2 days.  She reports that she has been dealing with WATERMAN for years, was recently evaluated for sleep apnea> was not given a diagnosis and dyspnea has progressed to being present at rest in conjunction with congestion symptoms over the last 2 days.  In addition, she reports, she has stopped taking her torsemide 20 mg p.o. daily 1 month ago d/t  frequent urination.  She reports eating a heart healthy diet.  She has had lower extremity swelling and weight gain that has waxed and waned over the last few years. She denies any associated symptoms of chest pain, palpitations, dizziness, lightheadedness, falls or syncopal events. D/t continuation of respiratory congestion and productive cough she decided to present to the ED for further evaluation.  Currently, she has no supplemental oxygen requirements, but reports little to no improvement in breathing, and orthopnea was observed during evaluation.    In addition she reports that years ago  that she had a coronary angiography that revealed normal coronaries.  She is followed by a cardiologist at [facility].     CV medications:   atorvastatin 80 mg p.o. daily, clonidine 0.1 mg p.o. twice daily, diltiazem 180 mg p.o. daily, Farxiga 5 mg p.o. daily(for diabetes), hydralazine 100 mg p.o. 3 times daily, Coumadin as directed    All other systems reviewed and negative unless as mentioned in HPI.     Last Recorded Vitals:  Vitals:    10/01/23 0422 10/01/23 0613 10/01/23 0900 10/01/23 1213   BP: 157/81  139/83 131/60   BP Location: Left arm  Left arm Left arm   Patient Position: Lying  Sitting Sitting   Pulse: 74  79 66   Resp: 18  18 18   Temp: 36 °C (96.8 °F)  36.1 °C (97 °F) 36.3 °C (97.3 °F)   TempSrc: Temporal   Oral   SpO2: 93% 93% 91% 95%   Weight:       Height:           Last Labs:  CBC - 10/1/2023:  6:32 AM  11.1 12.4 275    38.7      CMP - 9/30/2023:  2:19 PM  9.1 7.2 33 --- 0.4   _ 4.0 45 113      PTT - 9/30/2023:  2:19 PM  3.1   35.5 42     Troponin I, High Sensitivity   Date/Time Value Ref Range Status   10/01/2023 06:32 AM 13 0 - 13 ng/L Final   09/30/2023 02:19 PM 11 0 - 13 ng/L Final     Troponin I   Date/Time Value Ref Range Status   05/19/2022 07:06 AM 11 0 - 13 ng/L Final     Comment:     .  Less than 99th percentile of normal range cutoff-  Female and children under 18 years old <14 ng/L; Male <21 ng/L: Negative  Repeat testing should be performed if clinically indicated.   .  Female and children under 18 years old 14-50 ng/L; Male 21-50 ng/L:  Consistent with possible cardiac damage and possible increased clinical   risk. Serial measurements may help to assess extent of myocardial damage.   .  >50 ng/L: Consistent with cardiac damage, increased clinical risk and  myocardial infarction. Serial measurements may help assess extent of   myocardial damage.   .   NOTE: Children less than 1 year old may have higher baseline troponin   levels and results should be interpreted in conjunction with  the overall   clinical context.   .  NOTE: Troponin I testing is performed using a different   testing methodology at [facility] than at other   system hospitals. Direct result comparisons should only   be made within the same method.       BNP   Date/Time Value Ref Range Status   09/30/2023 02:19  (H) 0 - 99 pg/mL Final   05/17/2022 09:08 PM 43 0 - 99 pg/mL Final     Comment:     .  <100 pg/mL - Heart failure unlikely  100-299 pg/mL - Intermediate probability of acute heart  .               failure exacerbation. Correlate with clinical  .               context and patient history.    >=300 pg/mL - Heart Failure likely. Correlate with clinical  .               context and patient history.  BNP testing is performed using different testing   methodology at [facility] than at other   [facility] hospitals. Direct result comparisons should   only be made within the same method.       Hemoglobin A1C   Date/Time Value Ref Range Status   10/25/2022 07:25 AM 8.7 (H) 4.3 - 5.6 % Final     Comment:     American Diabetes Association guidelines indicate that patients with HgbA1c in the range 5.7-6.4% are at increased risk for development of diabetes, and intervention by lifestyle modification may be beneficial. HgbA1c greater or equal to 6.5% is considered diagnostic of diabetes.     POCT Hemoglobin A1C   Date/Time Value Ref Range Status   09/12/2023 09:20 AM 8.4 (A) 4.2 - 5.6 % Final     Comment:     Location:[facility], [address] [place] [address] [address], [place], [address]  Point of care (POC) Hemoglobin A1c (HGBA1C) testing is intended to assess  glucose control and provide a management tool for patients known to have  diabetes and their healthcare providers.  Target HGBA1C levels may depend on  specific clinical circumstances.  POC HGBA1C is not intended for use as a  diagnostic or screening test; laboratory-based testing should be used for  diagnostic purposes.  The following information is  supplemental and may not  be applicable to specific diabetes management situations:  The POC device   provides a normal range of 4.2% to 6.5% for the HGBA1C POC test.  However, the American Diabetes Association  guidelines indicate that  patients with HGBA1C in the range of 5.7% to 6.4% are at increased risk for  development of diabetes and that intervention by lifestyle modification may  be beneficial.  A HGBA1C level greater than or equal to 6.5% is considered  diagnostic of diabetes, pending confirmatory testing.  Use of HGBA1C testing  to evaluate glucose control may not be appropriate for patients with  hemoglobin variants or other conditions (e.g. anemia) that alter red blood  cell lifespan.   06/12/2023 09:27 AM 7.9 (A) 4.2 - 5.6 % Final     Comment:     Location:[facility], [address] [place] [address] [address], [place], [address]  Point of care (POC) Hemoglobin A1c (HGBA1C) testing is intended to assess  glucose control and provide a management tool for patients known to have  diabetes and their healthcare providers.  Target HGBA1C levels may depend on  specific clinical circumstances.  POC HGBA1C is not intended for use as a  diagnostic or screening test; laboratory-based testing should be used for  diagnostic purposes.  The following information is supplemental and may not  be applicable to specific diabetes management situations:  The POC device   provides a normal range of 4.2% to 6.5% for the HGBA1C POC test.  However, the American Diabetes Association  guidelines indicate that  patients with HGBA1C in the range of 5.7% to 6.4% are at increased risk for  development of diabetes and that intervention by lifestyle modification may  be beneficial.  A HGBA1C level greater than or equal to 6.5% is considered  diagnostic of diabetes, pending confirmatory testing.  Use of HGBA1C testing  to evaluate glucose control may not be appropriate for patients with  hemoglobin variants or other  conditions (e.g. anemia) that alter red blood  cell lifespan.     VLDL   Date/Time Value Ref Range Status   09/05/2023 10:38 AM 22 0 - 40 mg/dL Final   03/08/2023 08:10 AM 22 0 - 40 mg/dL Final   09/25/2021 07:53 AM 17 0 - 40 mg/dL Final      Last I/O:  I/O last 3 completed shifts:  In: 300 (3 mL/kg) [IV Piggyback:300]  Out: - (0 mL/kg)   Weight: 99.8 kg     Past Cardiology Tests (Last 3 Years):  Echo 5/18/22   1. The left ventricular systolic function is normal with a 60-65% estimated ejection fraction.   2. Spectral Doppler shows an impaired relaxation pattern of left ventricular diastolic filling.   3. There is an elevated mean left atrial pressure.   4. There is moderate mitral valve stenosis.   5. Moderately decreased mitral valve posterior leaflet mobility.   6. Mild aortic valve stenosis.          Past Medical History:  Diastolic HF  Asthma  Pulmonary embolism on Coumadin  Fibromyalgia  Diabetes mellitus type 2  Hypertension  Hyperlipidemia  Tremor      Past Surgical History:  Cataract surgery (9/2023)      Social History:  Denies smoking, alcohol or illicit drug use  Family History:  Reviewed, not pertinent to presenting problem     Allergies:  Amoxicillin, Hydrochlorothiazide, Lisinopril, and Losartan    Inpatient Medications:  Scheduled medications   Medication Dose Route Frequency    atorvastatin  80 mg oral Daily    azithromycin  500 mg oral q24h KYLE    brimonidine  1 drop Both Eyes BID    cefTRIAXone  1 g intravenous q24h    cloNIDine  0.1 mg oral q12h KYLE    dilTIAZem ER  180 mg oral Daily    gabapentin  600 mg oral TID    hydrALAZINE  100 mg oral TID    insulin lispro  0-5 Units subcutaneous TID with meals    insulin NPH and regular human  45 Units subcutaneous BID AC    ipratropium-albuteroL  3 mL nebulization TID    metoprolol tartrate  50 mg oral BID    montelukast  10 mg oral Nightly    pantoprazole  40 mg oral Daily before breakfast    polyethylene glycol  17 g oral Daily    primidone  50 mg oral  BID    topiramate  50 mg oral BID     PRN medications   Medication    acetaminophen    Or    acetaminophen    Or    acetaminophen    benzonatate    dextrose    dextrose    dextrose    fluticasone    glucagon    ipratropium-albuteroL    traMADol     Continuous Medications   Medication Dose Last Rate     Outpatient Medications:  Current Outpatient Medications   Medication Instructions    amLODIPine (Norvasc) 10 mg tablet 1 tablet, oral, Daily    atorvastatin (LIPITOR) 80 mg, oral, Daily    benzonatate (TESSALON) 100 mg, oral, 3 times daily PRN    bimatoprost (Lumigan) 0.01 % ophthalmic solution 1 drop, Both Eyes, Nightly    brimonidine (AlphaGAN P) 0.2 % ophthalmic solution 1 drop, Both Eyes, 2 times daily    cloNIDine (CATAPRES) 0.1 mg, oral, 2 times daily    dilTIAZem LA (Cardizem LA) 180 mg 24 hr tablet 1 tablet, oral, Daily    dorzolamide (Trusopt) 2 % ophthalmic solution 1 drop, Both Eyes, 3 times daily    DULoxetine (CYMBALTA) 30 mg, oral, Daily    Farxiga 5 mg, oral, Daily with breakfast    fluticasone (Flonase) 50 mcg/actuation nasal spray 1 spray, Each Nostril, Daily    fluticasone propion-salmeteroL (Advair Diskus) 500-50 mcg/dose diskus inhaler 1 puff, inhalation, 2 times daily RT    gabapentin (NEURONTIN) 600 mg, oral, 3 times daily    glipiZIDE (GLUCOTROL) 5 mg, oral, 2 times daily before meals    hydrALAZINE (APRESOLINE) 50 mg, oral, 3 times daily    insulin NPH and regular human (NovoLIN) 100 unit/mL (70-30) injection 45 Units, subcutaneous, 2 times daily before meals    ipratropium-albuteroL (Duo-Neb) 0.5-2.5 mg/3 mL nebulizer solution 3 mL, nebulization, 4 times daily RT    losartan (COZAAR) 100 mg, oral, Daily    metFORMIN (GLUCOPHAGE) 1,000 mg, oral, 2 times daily with meals    metoprolol tartrate (LOPRESSOR) 50 mg, oral, 2 times daily    montelukast (SINGULAIR) 10 mg, oral, Nightly    omeprazole (PRILOSEC) 40 mg, oral, Daily before breakfast    primidone (MYSOLINE) 50 mg, oral, 3 times daily     topiramate (TOPAMAX) 50 mg, oral, 2 times daily    traMADol (ULTRAM) 50 mg, oral, Every 8 hours PRN       Physical Exam:  Pleasant female, alert and oriented x3, NAD  Skin warm and dry  Mild JVD+  Lungs congested / + scattered wheezes throughout, reports productive cough with yellow sputum, orthopnea noted, no supplemental oxygen requirements  RRR, S1, S2 heard, no cardiac murmurs noted  Abdomen soft nontender  Lower extremities, trace to 1+ edema nonpitting, +pvd> discolored lower extremities  Appropriate mood and behavior       Assessment/Plan   Lia Jeong is a 73 y.o. female with a past medical history of pulmonary embolism (1994) on coumadin therapy, asthma, hypertension, hyperlipidemia, diabetes mellitus type 2, fibromyalgia, tremor, cataract surgery (9/23), Diastolic HF, presents with complaints of coughing up mucus and shortness of breath.  Initial EKG reveals sinus rhythm heart rate 69, chest x-ray reveals interstitial edema, WBC 11.1, urinalysis+ for moderate leukocytes,  was 43 (5/22), troponin negative x1.  Patient received IV antibiotic therapy, DuoNebs, and Lasix 40 mg IV x1> reports suboptimal urinary response. The decision was made for inpatient admission for further evaluation and treatment of PNA and heart failure.  Cardiology consulted for further evaluation of heart failure.    I reviewed EKG, reveals sinus rhythm heart rate 69  I reviewed all labs and imaging reports  Patient currently not on telemetry    1.  Acute on chronic diastolic heart failure in the setting of noncompliance with home diuretic therapy and PNA.  Reports suboptimal response to furosemide 40 mg IV x1  -Start furosemide 80 mg IV twice daily  - 2g Na/ 2000 ml fluid restrictions  -Daily weights, strict I's and O's, monitor renal function, replace electrolytes as needed    2.  Hypertension.  Acceptable given current circumstances    3.  Hyperlipidemia.  Continue statin    Recommendations:  IV diuretic therapy> monitor  response  Suspect 1 to 2 days of IV diuretic therapy then can transition to home dose torsemide 20 mg p.o. daily        Code Status:  Full Code          Anjali Arana, APRN-CNS

## 2023-10-02 LAB
ANION GAP SERPL CALC-SCNC: 12 MMOL/L (ref 10–20)
BASOPHILS # BLD AUTO: 0.04 X10*3/UL (ref 0–0.1)
BASOPHILS NFR BLD AUTO: 0.4 %
BUN SERPL-MCNC: 20 MG/DL (ref 6–23)
CALCIUM SERPL-MCNC: 8.7 MG/DL (ref 8.6–10.3)
CHLORIDE SERPL-SCNC: 99 MMOL/L (ref 98–107)
CO2 SERPL-SCNC: 26 MMOL/L (ref 21–32)
CREAT SERPL-MCNC: 0.77 MG/DL (ref 0.5–1.05)
EOSINOPHIL # BLD AUTO: 0.1 X10*3/UL (ref 0–0.4)
EOSINOPHIL NFR BLD AUTO: 1.1 %
ERYTHROCYTE [DISTWIDTH] IN BLOOD BY AUTOMATED COUNT: 15.1 % (ref 11.5–14.5)
GFR SERPL CREATININE-BSD FRML MDRD: 82 ML/MIN/1.73M*2
GLUCOSE BLD MANUAL STRIP-MCNC: 108 MG/DL (ref 74–99)
GLUCOSE SERPL-MCNC: 126 MG/DL (ref 74–99)
HCT VFR BLD AUTO: 38 % (ref 36–46)
HGB BLD-MCNC: 12.2 G/DL (ref 12–16)
IMM GRANULOCYTES # BLD AUTO: 0.06 X10*3/UL (ref 0–0.5)
IMM GRANULOCYTES NFR BLD AUTO: 0.7 % (ref 0–0.9)
INR PPP: 2.4 (ref 0.9–1.1)
LYMPHOCYTES # BLD AUTO: 1.16 X10*3/UL (ref 0.8–3)
LYMPHOCYTES NFR BLD AUTO: 12.7 %
MAGNESIUM SERPL-MCNC: 1.7 MG/DL (ref 1.6–2.4)
MCH RBC QN AUTO: 26.8 PG (ref 26–34)
MCHC RBC AUTO-ENTMCNC: 32.1 G/DL (ref 32–36)
MCV RBC AUTO: 83 FL (ref 80–100)
MONOCYTES # BLD AUTO: 0.71 X10*3/UL (ref 0.05–0.8)
MONOCYTES NFR BLD AUTO: 7.8 %
NEUTROPHILS # BLD AUTO: 7.05 X10*3/UL (ref 1.6–5.5)
NEUTROPHILS NFR BLD AUTO: 77.3 %
NRBC BLD-RTO: 0 /100 WBCS (ref 0–0)
PLATELET # BLD AUTO: 239 X10*3/UL (ref 150–450)
PMV BLD AUTO: 9.9 FL (ref 7.5–11.5)
POTASSIUM SERPL-SCNC: 3.6 MMOL/L (ref 3.5–5.3)
PROTHROMBIN TIME: 26.8 SECONDS (ref 9.8–12.8)
RBC # BLD AUTO: 4.56 X10*6/UL (ref 4–5.2)
SODIUM SERPL-SCNC: 133 MMOL/L (ref 136–145)
WBC # BLD AUTO: 9.1 X10*3/UL (ref 4.4–11.3)

## 2023-10-02 PROCEDURE — 2500000002 HC RX 250 W HCPCS SELF ADMINISTERED DRUGS (ALT 637 FOR MEDICARE OP, ALT 636 FOR OP/ED): Performed by: INTERNAL MEDICINE

## 2023-10-02 PROCEDURE — 2500000004 HC RX 250 GENERAL PHARMACY W/ HCPCS (ALT 636 FOR OP/ED)

## 2023-10-02 PROCEDURE — 85025 COMPLETE CBC W/AUTO DIFF WBC: CPT | Performed by: INTERNAL MEDICINE

## 2023-10-02 PROCEDURE — 99233 SBSQ HOSP IP/OBS HIGH 50: CPT

## 2023-10-02 PROCEDURE — 36415 COLL VENOUS BLD VENIPUNCTURE: CPT | Performed by: INTERNAL MEDICINE

## 2023-10-02 PROCEDURE — 2500000001 HC RX 250 WO HCPCS SELF ADMINISTERED DRUGS (ALT 637 FOR MEDICARE OP)

## 2023-10-02 PROCEDURE — 85610 PROTHROMBIN TIME: CPT | Performed by: INTERNAL MEDICINE

## 2023-10-02 PROCEDURE — 2500000005 HC RX 250 GENERAL PHARMACY W/O HCPCS: Performed by: INTERNAL MEDICINE

## 2023-10-02 PROCEDURE — 1100000001 HC PRIVATE ROOM DAILY

## 2023-10-02 PROCEDURE — 2500000001 HC RX 250 WO HCPCS SELF ADMINISTERED DRUGS (ALT 637 FOR MEDICARE OP): Performed by: INTERNAL MEDICINE

## 2023-10-02 PROCEDURE — 80048 BASIC METABOLIC PNL TOTAL CA: CPT | Performed by: INTERNAL MEDICINE

## 2023-10-02 PROCEDURE — 82947 ASSAY GLUCOSE BLOOD QUANT: CPT

## 2023-10-02 PROCEDURE — 94640 AIRWAY INHALATION TREATMENT: CPT

## 2023-10-02 PROCEDURE — 2500000004 HC RX 250 GENERAL PHARMACY W/ HCPCS (ALT 636 FOR OP/ED): Performed by: INTERNAL MEDICINE

## 2023-10-02 PROCEDURE — 2500000004 HC RX 250 GENERAL PHARMACY W/ HCPCS (ALT 636 FOR OP/ED): Performed by: NURSE PRACTITIONER

## 2023-10-02 PROCEDURE — 83735 ASSAY OF MAGNESIUM: CPT | Performed by: NURSE PRACTITIONER

## 2023-10-02 RX ORDER — METOLAZONE 5 MG/1
5 TABLET ORAL ONCE
Status: COMPLETED | OUTPATIENT
Start: 2023-10-02 | End: 2023-10-02

## 2023-10-02 RX ORDER — WARFARIN SODIUM 5 MG/1
5 TABLET ORAL DAILY
Status: DISCONTINUED | OUTPATIENT
Start: 2023-10-02 | End: 2023-10-02

## 2023-10-02 RX ORDER — POTASSIUM CHLORIDE 20 MEQ/1
40 TABLET, EXTENDED RELEASE ORAL ONCE
Status: COMPLETED | OUTPATIENT
Start: 2023-10-02 | End: 2023-10-02

## 2023-10-02 RX ORDER — WARFARIN SODIUM 5 MG/1
5 TABLET ORAL ONCE
Status: COMPLETED | OUTPATIENT
Start: 2023-10-02 | End: 2023-10-02

## 2023-10-02 RX ADMIN — ATORVASTATIN CALCIUM 80 MG: 80 TABLET, FILM COATED ORAL at 20:52

## 2023-10-02 RX ADMIN — FUROSEMIDE 80 MG: 10 INJECTION, SOLUTION INTRAMUSCULAR; INTRAVENOUS at 09:00

## 2023-10-02 RX ADMIN — Medication: at 08:00

## 2023-10-02 RX ADMIN — METOPROLOL TARTRATE 50 MG: 25 TABLET, FILM COATED ORAL at 10:23

## 2023-10-02 RX ADMIN — GABAPENTIN 600 MG: 300 CAPSULE ORAL at 10:23

## 2023-10-02 RX ADMIN — METOPROLOL TARTRATE 50 MG: 25 TABLET, FILM COATED ORAL at 20:52

## 2023-10-02 RX ADMIN — INSULIN HUMAN 45 UNITS: 100 INJECTION, SUSPENSION SUBCUTANEOUS at 10:34

## 2023-10-02 RX ADMIN — IPRATROPIUM BROMIDE AND ALBUTEROL SULFATE 3 ML: 2.5; .5 SOLUTION RESPIRATORY (INHALATION) at 08:42

## 2023-10-02 RX ADMIN — METOLAZONE 5 MG: 5 TABLET ORAL at 17:04

## 2023-10-02 RX ADMIN — HYDRALAZINE HYDROCHLORIDE 100 MG: 50 TABLET ORAL at 20:52

## 2023-10-02 RX ADMIN — FUROSEMIDE 80 MG: 10 INJECTION, SOLUTION INTRAMUSCULAR; INTRAVENOUS at 20:53

## 2023-10-02 RX ADMIN — MONTELUKAST 10 MG: 10 TABLET, FILM COATED ORAL at 20:52

## 2023-10-02 RX ADMIN — DILTIAZEM HYDROCHLORIDE 180 MG: 180 CAPSULE, EXTENDED RELEASE ORAL at 09:00

## 2023-10-02 RX ADMIN — GABAPENTIN 600 MG: 300 CAPSULE ORAL at 14:31

## 2023-10-02 RX ADMIN — CLONIDINE HYDROCHLORIDE 0.1 MG: 0.1 TABLET ORAL at 20:52

## 2023-10-02 RX ADMIN — TOPIRAMATE 50 MG: 25 TABLET, FILM COATED ORAL at 10:23

## 2023-10-02 RX ADMIN — BRIMONIDINE TARTRATE 1 DROP: 2 SOLUTION/ DROPS OPHTHALMIC at 10:24

## 2023-10-02 RX ADMIN — GABAPENTIN 600 MG: 300 CAPSULE ORAL at 20:52

## 2023-10-02 RX ADMIN — POTASSIUM CHLORIDE 40 MEQ: 1500 TABLET, EXTENDED RELEASE ORAL at 10:30

## 2023-10-02 RX ADMIN — INSULIN HUMAN 35 UNITS: 100 INJECTION, SUSPENSION SUBCUTANEOUS at 17:09

## 2023-10-02 RX ADMIN — PANTOPRAZOLE SODIUM 40 MG: 40 TABLET, DELAYED RELEASE ORAL at 06:44

## 2023-10-02 RX ADMIN — POLYETHYLENE GLYCOL (3350) 17 G: 17 POWDER, FOR SOLUTION ORAL at 10:24

## 2023-10-02 RX ADMIN — BRIMONIDINE TARTRATE 1 DROP: 2 SOLUTION/ DROPS OPHTHALMIC at 20:58

## 2023-10-02 RX ADMIN — HYDRALAZINE HYDROCHLORIDE 100 MG: 50 TABLET ORAL at 10:23

## 2023-10-02 RX ADMIN — HYDRALAZINE HYDROCHLORIDE 100 MG: 50 TABLET ORAL at 14:31

## 2023-10-02 RX ADMIN — CLONIDINE HYDROCHLORIDE 0.1 MG: 0.1 TABLET ORAL at 10:23

## 2023-10-02 RX ADMIN — PRIMIDONE 50 MG: 50 TABLET ORAL at 10:23

## 2023-10-02 RX ADMIN — WARFARIN SODIUM 5 MG: 5 TABLET ORAL at 17:04

## 2023-10-02 RX ADMIN — PRIMIDONE 50 MG: 50 TABLET ORAL at 20:52

## 2023-10-02 RX ADMIN — INSULIN LISPRO 3 UNITS: 100 INJECTION, SOLUTION INTRAVENOUS; SUBCUTANEOUS at 13:09

## 2023-10-02 RX ADMIN — IPRATROPIUM BROMIDE AND ALBUTEROL SULFATE 3 ML: 2.5; .5 SOLUTION RESPIRATORY (INHALATION) at 19:26

## 2023-10-02 RX ADMIN — TOPIRAMATE 50 MG: 25 TABLET, FILM COATED ORAL at 20:52

## 2023-10-02 SDOH — HEALTH STABILITY: PHYSICAL HEALTH: ON AVERAGE, HOW MANY DAYS PER WEEK DO YOU ENGAGE IN MODERATE TO STRENUOUS EXERCISE (LIKE A BRISK WALK)?: 2 DAYS

## 2023-10-02 SDOH — ECONOMIC STABILITY: INCOME INSECURITY: IN THE PAST 12 MONTHS, HAS THE ELECTRIC, GAS, OIL, OR WATER COMPANY THREATENED TO SHUT OFF SERVICE IN YOUR HOME?: NO

## 2023-10-02 SDOH — ECONOMIC STABILITY: INCOME INSECURITY: IN THE LAST 12 MONTHS, WAS THERE A TIME WHEN YOU WERE NOT ABLE TO PAY THE MORTGAGE OR RENT ON TIME?: NO

## 2023-10-02 SDOH — ECONOMIC STABILITY: HOUSING INSECURITY
IN THE LAST 12 MONTHS, WAS THERE A TIME WHEN YOU DID NOT HAVE A STEADY PLACE TO SLEEP OR SLEPT IN A SHELTER (INCLUDING NOW)?: NO

## 2023-10-02 SDOH — SOCIAL STABILITY: SOCIAL INSECURITY
WITHIN THE LAST YEAR, HAVE YOU BEEN KICKED, HIT, SLAPPED, OR OTHERWISE PHYSICALLY HURT BY YOUR PARTNER OR EX-PARTNER?: NO

## 2023-10-02 SDOH — SOCIAL STABILITY: SOCIAL NETWORK: HOW OFTEN DO YOU ATTENT MEETINGS OF THE CLUB OR ORGANIZATION YOU BELONG TO?: MORE THAN 4 TIMES PER YEAR

## 2023-10-02 SDOH — SOCIAL STABILITY: SOCIAL INSECURITY
WITHIN THE LAST YEAR, HAVE TO BEEN RAPED OR FORCED TO HAVE ANY KIND OF SEXUAL ACTIVITY BY YOUR PARTNER OR EX-PARTNER?: NO

## 2023-10-02 SDOH — HEALTH STABILITY: MENTAL HEALTH: HOW OFTEN DO YOU HAVE A DRINK CONTAINING ALCOHOL?: NEVER

## 2023-10-02 SDOH — HEALTH STABILITY: MENTAL HEALTH
STRESS IS WHEN SOMEONE FEELS TENSE, NERVOUS, ANXIOUS, OR CAN'T SLEEP AT NIGHT BECAUSE THEIR MIND IS TROUBLED. HOW STRESSED ARE YOU?: ONLY A LITTLE

## 2023-10-02 SDOH — ECONOMIC STABILITY: FOOD INSECURITY: WITHIN THE PAST 12 MONTHS, YOU WORRIED THAT YOUR FOOD WOULD RUN OUT BEFORE YOU GOT MONEY TO BUY MORE.: NEVER TRUE

## 2023-10-02 SDOH — SOCIAL STABILITY: SOCIAL NETWORK

## 2023-10-02 SDOH — ECONOMIC STABILITY: FOOD INSECURITY: WITHIN THE PAST 12 MONTHS, THE FOOD YOU BOUGHT JUST DIDN'T LAST AND YOU DIDN'T HAVE MONEY TO GET MORE.: NEVER TRUE

## 2023-10-02 SDOH — ECONOMIC STABILITY: TRANSPORTATION INSECURITY
IN THE PAST 12 MONTHS, HAS THE LACK OF TRANSPORTATION KEPT YOU FROM MEDICAL APPOINTMENTS OR FROM GETTING MEDICATIONS?: NO

## 2023-10-02 SDOH — SOCIAL STABILITY: SOCIAL NETWORK
DO YOU BELONG TO ANY CLUBS OR ORGANIZATIONS SUCH AS CHURCH GROUPS UNIONS, FRATERNAL OR ATHLETIC GROUPS, OR SCHOOL GROUPS?: YES

## 2023-10-02 SDOH — SOCIAL STABILITY: SOCIAL NETWORK: ARE YOU MARRIED, WIDOWED, DIVORCED, SEPARATED, NEVER MARRIED, OR LIVING WITH A PARTNER?: MARRIED

## 2023-10-02 SDOH — HEALTH STABILITY: MENTAL HEALTH: HOW MANY STANDARD DRINKS CONTAINING ALCOHOL DO YOU HAVE ON A TYPICAL DAY?: PATIENT DOES NOT DRINK

## 2023-10-02 SDOH — SOCIAL STABILITY: SOCIAL INSECURITY: WITHIN THE LAST YEAR, HAVE YOU BEEN AFRAID OF YOUR PARTNER OR EX-PARTNER?: NO

## 2023-10-02 SDOH — HEALTH STABILITY: MENTAL HEALTH: HOW OFTEN DO YOU HAVE 6 OR MORE DRINKS ON ONE OCCASION?: NEVER

## 2023-10-02 SDOH — SOCIAL STABILITY: SOCIAL NETWORK: HOW OFTEN DO YOU GET TOGETHER WITH FRIENDS OR RELATIVES?: MORE THAN THREE TIMES A WEEK

## 2023-10-02 SDOH — SOCIAL STABILITY: SOCIAL NETWORK
IN A TYPICAL WEEK, HOW MANY TIMES DO YOU TALK ON THE PHONE WITH FAMILY, FRIENDS, OR NEIGHBORS?: MORE THAN THREE TIMES A WEEK

## 2023-10-02 SDOH — SOCIAL STABILITY: SOCIAL INSECURITY: WITHIN THE LAST YEAR, HAVE YOU BEEN HUMILIATED OR EMOTIONALLY ABUSED IN OTHER WAYS BY YOUR PARTNER OR EX-PARTNER?: NO

## 2023-10-02 SDOH — HEALTH STABILITY: PHYSICAL HEALTH: ON AVERAGE, HOW MANY MINUTES DO YOU ENGAGE IN EXERCISE AT THIS LEVEL?: 30 MIN

## 2023-10-02 SDOH — ECONOMIC STABILITY: HOUSING INSECURITY: IN THE LAST 12 MONTHS, HOW MANY PLACES HAVE YOU LIVED?: 1

## 2023-10-02 SDOH — ECONOMIC STABILITY: INCOME INSECURITY: HOW HARD IS IT FOR YOU TO PAY FOR THE VERY BASICS LIKE FOOD, HOUSING, MEDICAL CARE, AND HEATING?: NOT HARD AT ALL

## 2023-10-02 SDOH — ECONOMIC STABILITY: TRANSPORTATION INSECURITY
IN THE PAST 12 MONTHS, HAS LACK OF TRANSPORTATION KEPT YOU FROM MEETINGS, WORK, OR FROM GETTING THINGS NEEDED FOR DAILY LIVING?: NO

## 2023-10-02 ASSESSMENT — COGNITIVE AND FUNCTIONAL STATUS - GENERAL
DAILY ACTIVITIY SCORE: 24
MOBILITY SCORE: 24
MOBILITY SCORE: 24
DAILY ACTIVITIY SCORE: 24

## 2023-10-02 ASSESSMENT — LIFESTYLE VARIABLES
AUDIT-C TOTAL SCORE: 0
SKIP TO QUESTIONS 9-10: 1

## 2023-10-02 ASSESSMENT — PAIN SCALES - GENERAL
PAINLEVEL_OUTOF10: 0 - NO PAIN
PAINLEVEL_OUTOF10: 0 - NO PAIN

## 2023-10-02 ASSESSMENT — PAIN - FUNCTIONAL ASSESSMENT
PAIN_FUNCTIONAL_ASSESSMENT: 0-10
PAIN_FUNCTIONAL_ASSESSMENT: 0-10

## 2023-10-02 NOTE — PROGRESS NOTES
Goals of Care Note    Discussed Code Status with patient.  Patient is alert and oriented x 3 and has capacity to make medical decisions.    Confirmed that her code status is Do Not Resuscitate.  She states that she has a state form at home, but does not have it with her.  Would like to continue it here.    Order entered in accordance with patient's wishes.     30 minutes was spent reviewing labs/vitals/chart and in discussion with patient.

## 2023-10-02 NOTE — SIGNIFICANT EVENT
10/02/23 0634   Discharge Planning   Living Arrangements Spouse/significant other   Support Systems Spouse/significant other   Assistance Needed none   Type of Residence Private residence   Home or Post Acute Services None   Patient expects to be discharged to: home with    Does the patient need discharge transport arranged? No   RoundTrip coordination needed? No   Has discharge transport been arranged? No   What day is the transport expected? 10/04/23

## 2023-10-02 NOTE — PROGRESS NOTES
Subjective Data:  Patient sitting up in chair, comfortable at rest, continues to have exertional dyspnea.  Reports suboptimal urinary response to iv diuretic therapy  Continues to have no supplemental oxygen requirements  Overnight Events:    No complaints overnight     Objective Data:  Last Recorded Vitals:  Vitals:    10/01/23 2137 10/02/23 0020 10/02/23 0750 10/02/23 1013   BP: 108/68 138/75 154/82    BP Location: Right arm Right arm Right arm    Patient Position: Lying Lying Sitting    Pulse: 63 63 56    Resp: 18 18 18    Temp: 36.9 °C (98.4 °F) 36.4 °C (97.5 °F) 37 °C (98.6 °F)    TempSrc: Temporal Temporal Oral    SpO2:   96%    Weight:    97 kg (213 lb 13.5 oz)   Height:           Last Labs:  CBC - 10/2/2023:  5:05 AM  9.1 12.2 239    38.0      CMP - 10/2/2023:  5:48 AM  8.7 7.2 33 --- 0.4   _ 4.0 45 113      PTT - 9/30/2023:  2:19 PM  2.4   26.8 42     TROPHS   Date/Time Value Ref Range Status   10/01/2023 06:32 AM 13 0 - 13 ng/L Final   09/30/2023 02:19 PM 11 0 - 13 ng/L Final   05/19/2022 07:06 AM 11 0 - 13 ng/L Final     Comment:     .  Less than 99th percentile of normal range cutoff-  Female and children under 18 years old <14 ng/L; Male <21 ng/L: Negative  Repeat testing should be performed if clinically indicated.   .  Female and children under 18 years old 14-50 ng/L; Male 21-50 ng/L:  Consistent with possible cardiac damage and possible increased clinical   risk. Serial measurements may help to assess extent of myocardial damage.   .  >50 ng/L: Consistent with cardiac damage, increased clinical risk and  myocardial infarction. Serial measurements may help assess extent of   myocardial damage.   .   NOTE: Children less than 1 year old may have higher baseline troponin   levels and results should be interpreted in conjunction with the overall   clinical context.   .  NOTE: Troponin I testing is performed using a different   testing methodology at [facility] than at other   system  [facility]. Direct result comparisons should only   be made within the same method.       BNP   Date/Time Value Ref Range Status   09/30/2023 02:19  0 - 99 pg/mL Final   05/17/2022 09:08 PM 43 0 - 99 pg/mL Final     Comment:     .  <100 pg/mL - Heart failure unlikely  100-299 pg/mL - Intermediate probability of acute heart  .               failure exacerbation. Correlate with clinical  .               context and patient history.    >=300 pg/mL - Heart Failure likely. Correlate with clinical  .               context and patient history.  BNP testing is performed using different testing   methodology at [facility] than at other   [facility]. Direct result comparisons should   only be made within the same method.       HGBA1C   Date/Time Value Ref Range Status   09/12/2023 09:20 AM 8.4 4.2 - 5.6 % Final     Comment:     Location:[facility], [address] [place] [address] , [place], [place], [address]  Point of care (POC) Hemoglobin A1c (HGBA1C) testing is intended to assess  glucose control and provide a management tool for patients known to have  diabetes and their healthcare providers.  Target HGBA1C levels may depend on  specific clinical circumstances.  POC HGBA1C is not intended for use as a  diagnostic or screening test; laboratory-based testing should be used for  diagnostic purposes.  The following information is supplemental and may not  be applicable to specific diabetes management situations:  The POC device   provides a normal range of 4.2% to 6.5% for the HGBA1C POC test.  However, the American Diabetes Association  guidelines indicate that  patients with HGBA1C in the range of 5.7% to 6.4% are at increased risk for  development of diabetes and that intervention by lifestyle modification may  be beneficial.  A HGBA1C level greater than or equal to 6.5% is considered  diagnostic of diabetes, pending confirmatory testing.  Use of HGBA1C testing  to evaluate glucose control may  not be appropriate for patients with  hemoglobin variants or other conditions (e.g. anemia) that alter red blood  cell lifespan.   06/12/2023 09:27 AM 7.9 4.2 - 5.6 % Final     Comment:     Location:[facility], [address] [place] [address] [address], [place], [address]  Point of care (POC) Hemoglobin A1c (HGBA1C) testing is intended to assess  glucose control and provide a management tool for patients known to have  diabetes and their healthcare providers.  Target HGBA1C levels may depend on  specific clinical circumstances.  POC HGBA1C is not intended for use as a  diagnostic or screening test; laboratory-based testing should be used for  diagnostic purposes.  The following information is supplemental and may not  be applicable to specific diabetes management situations:  The POC device   provides a normal range of 4.2% to 6.5% for the HGBA1C POC test.  However, the American Diabetes Association  guidelines indicate that  patients with HGBA1C in the range of 5.7% to 6.4% are at increased risk for  development of diabetes and that intervention by lifestyle modification may  be beneficial.  A HGBA1C level greater than or equal to 6.5% is considered  diagnostic of diabetes, pending confirmatory testing.  Use of HGBA1C testing  to evaluate glucose control may not be appropriate for patients with  hemoglobin variants or other conditions (e.g. anemia) that alter red blood  cell lifespan.   10/25/2022 07:25 AM 8.7 4.3 - 5.6 % Final     Comment:     American Diabetes Association guidelines indicate that patients with HgbA1c in the range 5.7-6.4% are at increased risk for development of diabetes, and intervention by lifestyle modification may be beneficial. HgbA1c greater or equal to 6.5% is considered diagnostic of diabetes.     VLDL   Date/Time Value Ref Range Status   09/05/2023 10:38 AM 22 0 - 40 mg/dL Final   03/08/2023 08:10 AM 22 0 - 40 mg/dL Final   09/25/2021 07:53 AM 17 0 - 40 mg/dL Final      Last  I/O:  I/O last 3 completed shifts:  In: 1000 (10 mL/kg) [P.O.:900; IV Piggyback:100]  Out: - (0 mL/kg)   Weight: 99.8 kg         Inpatient Medications:  Scheduled medications   Medication Dose Route Frequency    atorvastatin  80 mg oral Daily    brimonidine  1 drop Both Eyes BID    cloNIDine  0.1 mg oral q12h KYLE    dilTIAZem ER  180 mg oral Daily    furosemide  80 mg intravenous q12h    gabapentin  600 mg oral TID    hydrALAZINE  100 mg oral TID    insulin lispro  0-5 Units subcutaneous TID with meals    insulin NPH and regular human  35 Units subcutaneous BID AC    ipratropium-albuteroL  3 mL nebulization TID    metOLazone  5 mg oral Once    metoprolol tartrate  50 mg oral BID    montelukast  10 mg oral Nightly    oxygen   inhalation Continuous - 02/gases    pantoprazole  40 mg oral Daily before breakfast    polyethylene glycol  17 g oral Daily    primidone  50 mg oral BID    topiramate  50 mg oral BID    warfarin  5 mg oral Once     PRN medications   Medication    acetaminophen    Or    acetaminophen    Or    acetaminophen    benzonatate    dextrose    dextrose    dextrose    dextrose    dextrose    fluticasone    glucagon    glucagon    ipratropium-albuteroL    traMADol     Continuous Medications   Medication Dose Last Rate       Physical Exam:  Pleasant female, alert and oriented x3, NAD  Skin warm and dry  Mild JVD+  Lungs congested / + scattered wheezes throughout, orthopnea and lindquist noted, no supplemental oxygen requirements  RRR, S1, S2 heard, no cardiac murmurs noted  Abdomen soft nontender  Lower extremities, trace to 1+ edema nonpitting, +pvd> discolored lower extremities  Appropriate mood and behavior       Assessment/Plan   Lia Jeong is a 73 y.o. female with a past medical history of pulmonary embolism (1994) on coumadin therapy, asthma, hypertension, hyperlipidemia, diabetes mellitus type 2, fibromyalgia, tremor, cataract surgery (9/23), Diastolic HF, presents with complaints of coughing up mucus  and shortness of breath.  Initial EKG reveals sinus rhythm heart rate 69, chest x-ray reveals interstitial edema, WBC 11.1, urinalysis+ for moderate leukocytes,  was 43 (5/22), troponin negative x1.  Patient received IV antibiotic therapy, DuoNebs, and Lasix 40 mg IV x1> reports suboptimal urinary response. The decision was made for inpatient admission for further evaluation and treatment of PNA and heart failure.  Cardiology consulted for further evaluation of heart failure.     I reviewed EKG, reveals sinus rhythm heart rate 69  I reviewed all labs and imaging reports  Patient currently not on telemetry     1.  Acute on chronic diastolic heart failure in the setting of noncompliance with home diuretic therapy and PNA.  Continues to report suboptimal response to IV diuretic therapy> we will add metolazone 5 mg p.o. x1 today (10/2/23)  -c/w furosemide 80 mg IV twice daily  - 2g Na/ 2000 ml fluid restrictions  -Daily weights, strict I's and O's, monitor renal function, replace electrolytes as needed     2.  Hypertension.  Acceptable given current circumstances     3.  Hyperlipidemia.  Continue statin    4.  Pulmonary embolism history  -On Coumadin therapy> management per primary     Recommendations:  C/w IV diuretic therapy> monitor response  When euvolemic on exam> can transition to home dose torsemide 20 mg p.o. daily               MINDY Farrar-CNS

## 2023-10-02 NOTE — PROGRESS NOTES
"Lia Jeong is a 73 y.o. female on day 2 of admission presenting with fever, and cold and shortness of breath, she was admitted at community-acquired pneumonia, she is known to have diastolic heart failure, she does have some edema of the legs, and she is on IV Lasix, CAT scan of the chest did not show pneumonia, rather fluid overload, he is also being seen by the cardiologist, she is hoping to go today home,    Alert oriented to 3.  S1-S2.  Lungs clear.  Abdomen is soft nontender.  Legs plus edema.    Last Recorded Vitals  Blood pressure 138/75, pulse 63, temperature 36.4 °C (97.5 °F), temperature source Temporal, resp. rate 18, height 1.651 m (5' 5\"), weight 99.8 kg (220 lb), SpO2 95 %.  Intake/Output last 3 Shifts:  I/O last 3 completed shifts:  In: 1000 (10 mL/kg) [P.O.:900; IV Piggyback:100]  Out: - (0 mL/kg)   Weight: 99.8 kg         Scheduled medications  atorvastatin, 80 mg, oral, Daily  brimonidine, 1 drop, Both Eyes, BID  cloNIDine, 0.1 mg, oral, q12h KYLE  dilTIAZem ER, 180 mg, oral, Daily  furosemide, 80 mg, intravenous, q12h  gabapentin, 600 mg, oral, TID  hydrALAZINE, 100 mg, oral, TID  insulin lispro, 0-5 Units, subcutaneous, TID with meals  insulin NPH and regular human, 35 Units, subcutaneous, BID AC  ipratropium-albuteroL, 3 mL, nebulization, TID  metoprolol tartrate, 50 mg, oral, BID  montelukast, 10 mg, oral, Nightly  oxygen, , inhalation, Continuous - 02/gases  pantoprazole, 40 mg, oral, Daily before breakfast  polyethylene glycol, 17 g, oral, Daily  primidone, 50 mg, oral, BID  topiramate, 50 mg, oral, BID  warfarin, 5 mg, oral, Once      Results for orders placed or performed during the hospital encounter of 09/30/23 (from the past 96 hour(s))   SARS-CoV-2 RT PCR   Result Value Ref Range    Coronavirus 2019, PCR Not Detected Not Detected   Influenza A, and B PCR   Result Value Ref Range    Flu A Result Not Detected Not Detected    Flu B Result Not Detected Not Detected   RSV PCR   Result " Value Ref Range    RSV PCR Not Detected Not Detected   CBC and Auto Differential   Result Value Ref Range    WBC 11.6 (H) 4.4 - 11.3 x10*3/uL    nRBC 0.0 0.0 - 0.0 /100 WBCs    RBC 4.77 4.00 - 5.20 x10*6/uL    Hemoglobin 12.9 12.0 - 16.0 g/dL    Hematocrit 41.2 36.0 - 46.0 %    MCV 86 80 - 100 fL    MCH 27.0 26.0 - 34.0 pg    MCHC 31.3 (L) 32.0 - 36.0 g/dL    RDW 15.5 (H) 11.5 - 14.5 %    Platelets 297 150 - 450 x10*3/uL    MPV 9.6 7.5 - 11.5 fL    Neutrophils % 78.7 40.0 - 80.0 %    Immature Granulocytes %, Automated 0.3 0.0 - 0.9 %    Lymphocytes % 11.3 13.0 - 44.0 %    Monocytes % 8.4 2.0 - 10.0 %    Eosinophils % 1.0 0.0 - 6.0 %    Basophils % 0.3 0.0 - 2.0 %    Neutrophils Absolute 9.07 (H) 1.60 - 5.50 x10*3/uL    Immature Granulocytes Absolute, Automated 0.04 0.00 - 0.50 x10*3/uL    Lymphocytes Absolute 1.31 0.80 - 3.00 x10*3/uL    Monocytes Absolute 0.97 (H) 0.05 - 0.80 x10*3/uL    Eosinophils Absolute 0.12 0.00 - 0.40 x10*3/uL    Basophils Absolute 0.04 0.00 - 0.10 x10*3/uL   Comprehensive Metabolic Panel   Result Value Ref Range    Glucose 276 (H) 74 - 99 mg/dL    Sodium 138 136 - 145 mmol/L    Potassium 4.2 3.5 - 5.3 mmol/L    Chloride 102 98 - 107 mmol/L    Bicarbonate 31 21 - 32 mmol/L    Anion Gap 9 (L) 10 - 20 mmol/L    Urea Nitrogen 14 6 - 23 mg/dL    Creatinine 0.71 0.50 - 1.05 mg/dL    eGFR 90 >60 mL/min/1.73m*2    Calcium 9.1 8.6 - 10.3 mg/dL    Albumin 4.0 3.4 - 5.0 g/dL    Alkaline Phosphatase 113 33 - 136 U/L    Total Protein 7.2 6.4 - 8.2 g/dL    AST 33 9 - 39 U/L    Bilirubin, Total 0.4 0.0 - 1.2 mg/dL    ALT 45 7 - 45 U/L   Magnesium   Result Value Ref Range    Magnesium 1.70 1.60 - 2.40 mg/dL   B-type natriuretic peptide   Result Value Ref Range     (H) 0 - 99 pg/mL   Coagulation Screen   Result Value Ref Range    Protime 39.0 (H) 9.8 - 12.8 seconds    INR 3.4 (H) 0.9 - 1.1    aPTT 42 (H) 27 - 38 seconds   Troponin I, High Sensitivity, Initial   Result Value Ref Range    Troponin I,  High Sensitivity 11 0 - 13 ng/L   Urinalysis with Reflex Microscopic   Result Value Ref Range    Color, Urine Colorless (N) Straw, Yellow    Appearance, Urine Clear Clear    Specific Gravity, Urine 1.004 (N) 1.005 - 1.035    pH, Urine 7.0 5.0, 5.5, 6.0, 6.5, 7.0, 7.5, 8.0    Protein, Urine NEGATIVE NEGATIVE mg/dL    Glucose, Urine 150 (2+) (A) NEGATIVE mg/dL    Blood, Urine NEGATIVE NEGATIVE    Ketones, Urine NEGATIVE NEGATIVE mg/dL    Bilirubin, Urine NEGATIVE NEGATIVE    Urobilinogen, Urine <2.0 <2.0 mg/dL    Nitrite, Urine NEGATIVE NEGATIVE    Leukocyte Esterase, Urine MODERATE (2+) (A) NEGATIVE   Urinalysis Microscopic Only   Result Value Ref Range    WBC, Urine 1-5 1-5, NONE /HPF    RBC, Urine NONE NONE, 1-2, 3-5 /HPF    Squamous Epithelial Cells, Urine NONE Reference range not established. /HPF   Troponin, High Sensitivity, 1 Hour   Result Value Ref Range    Troponin I, High Sensitivity 13 0 - 13 ng/L   CBC and Auto Differential   Result Value Ref Range    WBC 11.1 4.4 - 11.3 x10*3/uL    nRBC 0.0 0.0 - 0.0 /100 WBCs    RBC 4.58 4.00 - 5.20 x10*6/uL    Hemoglobin 12.4 12.0 - 16.0 g/dL    Hematocrit 38.7 36.0 - 46.0 %    MCV 85 80 - 100 fL    MCH 27.1 26.0 - 34.0 pg    MCHC 32.0 32.0 - 36.0 g/dL    RDW 15.3 (H) 11.5 - 14.5 %    Platelets 275 150 - 450 x10*3/uL    MPV 9.9 7.5 - 11.5 fL    Neutrophils % 76.5 40.0 - 80.0 %    Immature Granulocytes %, Automated 0.5 0.0 - 0.9 %    Lymphocytes % 12.6 13.0 - 44.0 %    Monocytes % 8.5 2.0 - 10.0 %    Eosinophils % 1.4 0.0 - 6.0 %    Basophils % 0.5 0.0 - 2.0 %    Neutrophils Absolute 8.47 (H) 1.60 - 5.50 x10*3/uL    Immature Granulocytes Absolute, Automated 0.05 0.00 - 0.50 x10*3/uL    Lymphocytes Absolute 1.39 0.80 - 3.00 x10*3/uL    Monocytes Absolute 0.94 (H) 0.05 - 0.80 x10*3/uL    Eosinophils Absolute 0.16 0.00 - 0.40 x10*3/uL    Basophils Absolute 0.05 0.00 - 0.10 x10*3/uL   Lactate   Result Value Ref Range    Lactate 1.2 0.4 - 2.0 mmol/L   POCT GLUCOSE   Result  Value Ref Range    POCT Glucose 317 (H) 74 - 99 mg/dL   Protime-INR   Result Value Ref Range    Protime 35.5 (H) 9.8 - 12.8 seconds    INR 3.1 (H) 0.9 - 1.1   POCT GLUCOSE   Result Value Ref Range    POCT Glucose 87 74 - 99 mg/dL   CBC and Auto Differential   Result Value Ref Range    WBC 9.1 4.4 - 11.3 x10*3/uL    nRBC 0.0 0.0 - 0.0 /100 WBCs    RBC 4.56 4.00 - 5.20 x10*6/uL    Hemoglobin 12.2 12.0 - 16.0 g/dL    Hematocrit 38.0 36.0 - 46.0 %    MCV 83 80 - 100 fL    MCH 26.8 26.0 - 34.0 pg    MCHC 32.1 32.0 - 36.0 g/dL    RDW 15.1 (H) 11.5 - 14.5 %    Platelets 239 150 - 450 x10*3/uL    MPV 9.9 7.5 - 11.5 fL    Neutrophils % 77.3 40.0 - 80.0 %    Immature Granulocytes %, Automated 0.7 0.0 - 0.9 %    Lymphocytes % 12.7 13.0 - 44.0 %    Monocytes % 7.8 2.0 - 10.0 %    Eosinophils % 1.1 0.0 - 6.0 %    Basophils % 0.4 0.0 - 2.0 %    Neutrophils Absolute 7.05 (H) 1.60 - 5.50 x10*3/uL    Immature Granulocytes Absolute, Automated 0.06 0.00 - 0.50 x10*3/uL    Lymphocytes Absolute 1.16 0.80 - 3.00 x10*3/uL    Monocytes Absolute 0.71 0.05 - 0.80 x10*3/uL    Eosinophils Absolute 0.10 0.00 - 0.40 x10*3/uL    Basophils Absolute 0.04 0.00 - 0.10 x10*3/uL   Protime-INR   Result Value Ref Range    Protime 26.8 (H) 9.8 - 12.8 seconds    INR 2.4 (H) 0.9 - 1.1   Basic Metabolic Panel   Result Value Ref Range    Glucose 126 (H) 74 - 99 mg/dL    Sodium 133 (L) 136 - 145 mmol/L    Potassium 3.6 3.5 - 5.3 mmol/L    Chloride 99 98 - 107 mmol/L    Bicarbonate 26 21 - 32 mmol/L    Anion Gap 12 10 - 20 mmol/L    Urea Nitrogen 20 6 - 23 mg/dL    Creatinine 0.77 0.50 - 1.05 mg/dL    eGFR 82 >60 mL/min/1.73m*2    Calcium 8.7 8.6 - 10.3 mg/dL   Magnesium   Result Value Ref Range    Magnesium 1.70 1.60 - 2.40 mg/dL      Continuous medications     PRN medications  PRN medications: acetaminophen **OR** acetaminophen **OR** acetaminophen, benzonatate, dextrose, dextrose, dextrose, dextrose, dextrose, fluticasone, glucagon, glucagon,  ipratropium-albuteroL, traMADol                   Assessment/Plan     She was admitted as community-acquired pneumonia, CAT scan of the chest did not show pneumonia, and she feels much better,.  Also diagnosed with fluid overload acute on chronic diastolic heart failure, on IV Lasix, she is much better, she is going to hoping to go home today,.  Diabetes, her glucose is lower today, as a result we are reducing the insulin,.  Hypertension we are continuing the same.  Hyperlipidemia same  DVT prophylaxis       I spent 30 minutes in the professional and overall care of this patient.      Ghazal Gray MD

## 2023-10-02 NOTE — NURSING NOTE
Pt blood sugars not appearing in EPIC. Pt due for accuchecks ACHS. Pt blood sugar coverage using sliding scale. Pt covered using sliding scale directly from number on glucometer throughout shift.

## 2023-10-02 NOTE — CARE PLAN
The patient's goals for the shift include  NOT FALL DURING SHIFT         Over the shift, the patient did not make progress toward the following goals. Barriers to progression include CAP. Recommendations to address these barriers include ENCOURAGE AMBULATION WITH WALKER AND SAFETY SOCKS.

## 2023-10-03 LAB
ALBUMIN SERPL BCP-MCNC: 3.4 G/DL (ref 3.4–5)
ANION GAP SERPL CALC-SCNC: 13 MMOL/L (ref 10–20)
ANION GAP SERPL CALC-SCNC: 16 MMOL/L (ref 10–20)
BUN SERPL-MCNC: 22 MG/DL (ref 6–23)
BUN SERPL-MCNC: 31 MG/DL (ref 6–23)
CALCIUM SERPL-MCNC: 9 MG/DL (ref 8.6–10.3)
CALCIUM SERPL-MCNC: 9.1 MG/DL (ref 8.6–10.3)
CHLORIDE SERPL-SCNC: 90 MMOL/L (ref 98–107)
CHLORIDE SERPL-SCNC: 99 MMOL/L (ref 98–107)
CO2 SERPL-SCNC: 22 MMOL/L (ref 21–32)
CO2 SERPL-SCNC: 26 MMOL/L (ref 21–32)
CREAT SERPL-MCNC: 0.84 MG/DL (ref 0.5–1.05)
CREAT SERPL-MCNC: 1.26 MG/DL (ref 0.5–1.05)
ERYTHROCYTE [DISTWIDTH] IN BLOOD BY AUTOMATED COUNT: 15 % (ref 11.5–14.5)
GFR SERPL CREATININE-BSD FRML MDRD: 45 ML/MIN/1.73M*2
GFR SERPL CREATININE-BSD FRML MDRD: 73 ML/MIN/1.73M*2
GLUCOSE BLD MANUAL STRIP-MCNC: 577 MG/DL (ref 74–99)
GLUCOSE BLD MANUAL STRIP-MCNC: >600 MG/DL (ref 74–99)
GLUCOSE SERPL-MCNC: 536 MG/DL (ref 74–99)
GLUCOSE SERPL-MCNC: 56 MG/DL (ref 74–99)
HCT VFR BLD AUTO: 38.7 % (ref 36–46)
HGB BLD-MCNC: 12.7 G/DL (ref 12–16)
INR PPP: 1.5 (ref 0.9–1.1)
MAGNESIUM SERPL-MCNC: 1.6 MG/DL (ref 1.6–2.4)
MCH RBC QN AUTO: 27.5 PG (ref 26–34)
MCHC RBC AUTO-ENTMCNC: 32.8 G/DL (ref 32–36)
MCV RBC AUTO: 84 FL (ref 80–100)
NRBC BLD-RTO: 0 /100 WBCS (ref 0–0)
PHOSPHATE SERPL-MCNC: 3.7 MG/DL (ref 2.5–4.9)
PLATELET # BLD AUTO: 266 X10*3/UL (ref 150–450)
PMV BLD AUTO: 9.6 FL (ref 7.5–11.5)
POTASSIUM SERPL-SCNC: 3.7 MMOL/L (ref 3.5–5.3)
POTASSIUM SERPL-SCNC: 3.8 MMOL/L (ref 3.5–5.3)
PROTHROMBIN TIME: 16.9 SECONDS (ref 9.8–12.8)
RBC # BLD AUTO: 4.62 X10*6/UL (ref 4–5.2)
SODIUM SERPL-SCNC: 124 MMOL/L (ref 136–145)
SODIUM SERPL-SCNC: 134 MMOL/L (ref 136–145)
WBC # BLD AUTO: 9.5 X10*3/UL (ref 4.4–11.3)

## 2023-10-03 PROCEDURE — 2500000004 HC RX 250 GENERAL PHARMACY W/ HCPCS (ALT 636 FOR OP/ED): Performed by: INTERNAL MEDICINE

## 2023-10-03 PROCEDURE — 85027 COMPLETE CBC AUTOMATED: CPT | Performed by: NURSE PRACTITIONER

## 2023-10-03 PROCEDURE — 94640 AIRWAY INHALATION TREATMENT: CPT

## 2023-10-03 PROCEDURE — 36415 COLL VENOUS BLD VENIPUNCTURE: CPT | Performed by: INTERNAL MEDICINE

## 2023-10-03 PROCEDURE — 94760 N-INVAS EAR/PLS OXIMETRY 1: CPT

## 2023-10-03 PROCEDURE — 2500000002 HC RX 250 W HCPCS SELF ADMINISTERED DRUGS (ALT 637 FOR MEDICARE OP, ALT 636 FOR OP/ED): Performed by: INTERNAL MEDICINE

## 2023-10-03 PROCEDURE — 80048 BASIC METABOLIC PNL TOTAL CA: CPT | Performed by: NURSE PRACTITIONER

## 2023-10-03 PROCEDURE — 1100000001 HC PRIVATE ROOM DAILY

## 2023-10-03 PROCEDURE — 83735 ASSAY OF MAGNESIUM: CPT | Performed by: NURSE PRACTITIONER

## 2023-10-03 PROCEDURE — 2500000001 HC RX 250 WO HCPCS SELF ADMINISTERED DRUGS (ALT 637 FOR MEDICARE OP): Performed by: PHYSICIAN ASSISTANT

## 2023-10-03 PROCEDURE — 82947 ASSAY GLUCOSE BLOOD QUANT: CPT

## 2023-10-03 PROCEDURE — 2500000004 HC RX 250 GENERAL PHARMACY W/ HCPCS (ALT 636 FOR OP/ED)

## 2023-10-03 PROCEDURE — 2500000001 HC RX 250 WO HCPCS SELF ADMINISTERED DRUGS (ALT 637 FOR MEDICARE OP): Performed by: INTERNAL MEDICINE

## 2023-10-03 PROCEDURE — 85610 PROTHROMBIN TIME: CPT | Performed by: INTERNAL MEDICINE

## 2023-10-03 PROCEDURE — 36415 COLL VENOUS BLD VENIPUNCTURE: CPT | Performed by: PHYSICIAN ASSISTANT

## 2023-10-03 PROCEDURE — 2500000004 HC RX 250 GENERAL PHARMACY W/ HCPCS (ALT 636 FOR OP/ED): Performed by: NURSE PRACTITIONER

## 2023-10-03 PROCEDURE — 80048 BASIC METABOLIC PNL TOTAL CA: CPT | Mod: CCI | Performed by: PHYSICIAN ASSISTANT

## 2023-10-03 RX ORDER — DEXTROSE MONOHYDRATE 100 MG/ML
0.3 INJECTION, SOLUTION INTRAVENOUS ONCE AS NEEDED
Status: DISCONTINUED | OUTPATIENT
Start: 2023-10-03 | End: 2023-10-03

## 2023-10-03 RX ORDER — DOCUSATE SODIUM 100 MG/1
100 CAPSULE, LIQUID FILLED ORAL 2 TIMES DAILY
Status: DISCONTINUED | OUTPATIENT
Start: 2023-10-03 | End: 2023-10-05 | Stop reason: HOSPADM

## 2023-10-03 RX ORDER — DEXTROSE 50 % IN WATER (D50W) INTRAVENOUS SYRINGE
25
Status: DISCONTINUED | OUTPATIENT
Start: 2023-10-03 | End: 2023-10-05 | Stop reason: HOSPADM

## 2023-10-03 RX ORDER — PREDNISONE 20 MG/1
40 TABLET ORAL DAILY
Status: DISCONTINUED | OUTPATIENT
Start: 2023-10-03 | End: 2023-10-05 | Stop reason: HOSPADM

## 2023-10-03 RX ORDER — DEXTROSE MONOHYDRATE 100 MG/ML
30 INJECTION, SOLUTION INTRAVENOUS ONCE AS NEEDED
Status: DISCONTINUED | OUTPATIENT
Start: 2023-10-03 | End: 2023-10-05 | Stop reason: HOSPADM

## 2023-10-03 RX ORDER — MAGNESIUM HYDROXIDE 2400 MG/10ML
10 SUSPENSION ORAL DAILY PRN
Status: DISCONTINUED | OUTPATIENT
Start: 2023-10-03 | End: 2023-10-05 | Stop reason: HOSPADM

## 2023-10-03 RX ORDER — WARFARIN SODIUM 5 MG/1
10 TABLET ORAL
Status: DISCONTINUED | OUTPATIENT
Start: 2023-10-05 | End: 2023-10-05 | Stop reason: HOSPADM

## 2023-10-03 RX ORDER — INSULIN LISPRO 100 [IU]/ML
12 INJECTION, SOLUTION INTRAVENOUS; SUBCUTANEOUS ONCE
Status: COMPLETED | OUTPATIENT
Start: 2023-10-03 | End: 2023-10-03

## 2023-10-03 RX ORDER — DILTIAZEM HYDROCHLORIDE 180 MG/1
180 CAPSULE, COATED, EXTENDED RELEASE ORAL DAILY
Status: DISCONTINUED | OUTPATIENT
Start: 2023-10-03 | End: 2023-10-05 | Stop reason: HOSPADM

## 2023-10-03 RX ADMIN — PRIMIDONE 50 MG: 50 TABLET ORAL at 09:28

## 2023-10-03 RX ADMIN — GABAPENTIN 600 MG: 300 CAPSULE ORAL at 17:53

## 2023-10-03 RX ADMIN — DOCUSATE SODIUM 100 MG: 100 CAPSULE, LIQUID FILLED ORAL at 22:20

## 2023-10-03 RX ADMIN — INSULIN HUMAN 35 UNITS: 100 INJECTION, SUSPENSION SUBCUTANEOUS at 10:23

## 2023-10-03 RX ADMIN — MAGNESIUM HYDROXIDE 10 ML: 2400 SUSPENSION ORAL at 22:20

## 2023-10-03 RX ADMIN — HYDRALAZINE HYDROCHLORIDE 100 MG: 50 TABLET ORAL at 17:45

## 2023-10-03 RX ADMIN — INSULIN LISPRO 5 UNITS: 100 INJECTION, SOLUTION INTRAVENOUS; SUBCUTANEOUS at 17:52

## 2023-10-03 RX ADMIN — IPRATROPIUM BROMIDE AND ALBUTEROL SULFATE 3 ML: 2.5; .5 SOLUTION RESPIRATORY (INHALATION) at 19:27

## 2023-10-03 RX ADMIN — POLYETHYLENE GLYCOL (3350) 17 G: 17 POWDER, FOR SOLUTION ORAL at 09:26

## 2023-10-03 RX ADMIN — TOPIRAMATE 50 MG: 25 TABLET, FILM COATED ORAL at 20:21

## 2023-10-03 RX ADMIN — HYDRALAZINE HYDROCHLORIDE 100 MG: 50 TABLET ORAL at 20:20

## 2023-10-03 RX ADMIN — CLONIDINE HYDROCHLORIDE 0.1 MG: 0.1 TABLET ORAL at 20:21

## 2023-10-03 RX ADMIN — PREDNISONE 40 MG: 20 TABLET ORAL at 09:30

## 2023-10-03 RX ADMIN — CLONIDINE HYDROCHLORIDE 0.1 MG: 0.1 TABLET ORAL at 09:26

## 2023-10-03 RX ADMIN — IPRATROPIUM BROMIDE AND ALBUTEROL SULFATE 3 ML: 2.5; .5 SOLUTION RESPIRATORY (INHALATION) at 07:34

## 2023-10-03 RX ADMIN — MONTELUKAST 10 MG: 10 TABLET, FILM COATED ORAL at 20:21

## 2023-10-03 RX ADMIN — INSULIN LISPRO 12 UNITS: 100 INJECTION, SOLUTION INTRAVENOUS; SUBCUTANEOUS at 17:45

## 2023-10-03 RX ADMIN — INSULIN LISPRO 5 UNITS: 100 INJECTION, SOLUTION INTRAVENOUS; SUBCUTANEOUS at 12:58

## 2023-10-03 RX ADMIN — PANTOPRAZOLE SODIUM 40 MG: 40 TABLET, DELAYED RELEASE ORAL at 06:11

## 2023-10-03 RX ADMIN — ATORVASTATIN CALCIUM 80 MG: 80 TABLET, FILM COATED ORAL at 20:20

## 2023-10-03 RX ADMIN — INSULIN HUMAN 35 UNITS: 100 INJECTION, SUSPENSION SUBCUTANEOUS at 17:45

## 2023-10-03 RX ADMIN — HYDRALAZINE HYDROCHLORIDE 100 MG: 50 TABLET ORAL at 09:26

## 2023-10-03 RX ADMIN — TOPIRAMATE 50 MG: 25 TABLET, FILM COATED ORAL at 09:26

## 2023-10-03 RX ADMIN — METOPROLOL TARTRATE 50 MG: 25 TABLET, FILM COATED ORAL at 09:26

## 2023-10-03 RX ADMIN — IPRATROPIUM BROMIDE AND ALBUTEROL SULFATE 3 ML: 2.5; .5 SOLUTION RESPIRATORY (INHALATION) at 15:49

## 2023-10-03 RX ADMIN — GABAPENTIN 600 MG: 300 CAPSULE ORAL at 09:26

## 2023-10-03 RX ADMIN — FUROSEMIDE 80 MG: 10 INJECTION, SOLUTION INTRAMUSCULAR; INTRAVENOUS at 08:45

## 2023-10-03 RX ADMIN — DILTIAZEM HYDROCHLORIDE 180 MG: 180 CAPSULE, COATED, EXTENDED RELEASE ORAL at 10:23

## 2023-10-03 RX ADMIN — GABAPENTIN 600 MG: 300 CAPSULE ORAL at 20:20

## 2023-10-03 RX ADMIN — BRIMONIDINE TARTRATE 1 DROP: 2 SOLUTION/ DROPS OPHTHALMIC at 20:25

## 2023-10-03 RX ADMIN — BRIMONIDINE TARTRATE 1 DROP: 2 SOLUTION/ DROPS OPHTHALMIC at 09:28

## 2023-10-03 RX ADMIN — PRIMIDONE 50 MG: 50 TABLET ORAL at 20:20

## 2023-10-03 RX ADMIN — WARFARIN SODIUM 12.5 MG: 2.5 TABLET ORAL at 17:44

## 2023-10-03 RX ADMIN — FUROSEMIDE 80 MG: 10 INJECTION, SOLUTION INTRAMUSCULAR; INTRAVENOUS at 21:08

## 2023-10-03 RX ADMIN — METOPROLOL TARTRATE 50 MG: 25 TABLET, FILM COATED ORAL at 20:20

## 2023-10-03 ASSESSMENT — PAIN SCALES - GENERAL: PAINLEVEL_OUTOF10: 0 - NO PAIN

## 2023-10-03 ASSESSMENT — PAIN - FUNCTIONAL ASSESSMENT: PAIN_FUNCTIONAL_ASSESSMENT: 0-10

## 2023-10-03 NOTE — PROGRESS NOTES
Subjective Data:  Continues to complain of shortness of breath. Does not feel at her baseline yet. Reports good urinary output today     Overnight Events:    None      Objective Data:  Last Recorded Vitals:  Vitals:    10/03/23 0850 10/03/23 1102 10/03/23 1534 10/03/23 1549   BP:  124/75 116/71    BP Location:       Patient Position:       Pulse:  69 70    Resp:  18 18    Temp:  36.5 °C (97.7 °F) 37.2 °C (99 °F)    TempSrc:       SpO2:  94% 93% 94%   Weight: 96.3 kg (212 lb 4.9 oz)      Height:           Last Labs:  CBC - 10/3/2023:  6:57 AM  9.5 12.7 266    38.7      CMP - 10/3/2023:  6:57 AM  9.0 7.2 33 --- 0.4   _ 4.0 45 113      PTT - 9/30/2023:  2:19 PM  1.5   16.9 42     TROPHS   Date/Time Value Ref Range Status   10/01/2023 06:32 AM 13 0 - 13 ng/L Final   09/30/2023 02:19 PM 11 0 - 13 ng/L Final   05/19/2022 07:06 AM 11 0 - 13 ng/L Final     Comment:     .  Less than 99th percentile of normal range cutoff-  Female and children under 18 years old <14 ng/L; Male <21 ng/L: Negative  Repeat testing should be performed if clinically indicated.   .  Female and children under 18 years old 14-50 ng/L; Male 21-50 ng/L:  Consistent with possible cardiac damage and possible increased clinical   risk. Serial measurements may help to assess extent of myocardial damage.   .  >50 ng/L: Consistent with cardiac damage, increased clinical risk and  myocardial infarction. Serial measurements may help assess extent of   myocardial damage.   .   NOTE: Children less than 1 year old may have higher baseline troponin   levels and results should be interpreted in conjunction with the overall   clinical context.   .  NOTE: Troponin I testing is performed using a different   testing methodology at [facility] than at other   [facility] hospitals. Direct result comparisons should only   be made within the same method.       BNP   Date/Time Value Ref Range Status   09/30/2023 02:19  0 - 99 pg/mL Final   05/17/2022 09:08 PM  43 0 - 99 pg/mL Final     Comment:     .  <100 pg/mL - Heart failure unlikely  100-299 pg/mL - Intermediate probability of acute heart  .               failure exacerbation. Correlate with clinical  .               context and patient history.    >=300 pg/mL - Heart Failure likely. Correlate with clinical  .               context and patient history.  BNP testing is performed using different testing   methodology at [facility]] than at other   [facility]. Direct result comparisons should   only be made within the same method.       HGBA1C   Date/Time Value Ref Range Status   09/12/2023 09:20 AM 8.4 4.2 - 5.6 % Final     Comment:     Location:[facility], [address] [place] [address] , [place], [address]  Point of care (POC) Hemoglobin A1c (HGBA1C) testing is intended to assess  glucose control and provide a management tool for patients known to have  diabetes and their healthcare providers.  Target HGBA1C levels may depend on  specific clinical circumstances.  POC HGBA1C is not intended for use as a  diagnostic or screening test; laboratory-based testing should be used for  diagnostic purposes.  The following information is supplemental and may not  be applicable to specific diabetes management situations:  The POC device   provides a normal range of 4.2% to 6.5% for the HGBA1C POC test.  However, the American Diabetes Association  guidelines indicate that  patients with HGBA1C in the range of 5.7% to 6.4% are at increased risk for  development of diabetes and that intervention by lifestyle modification may  be beneficial.  A HGBA1C level greater than or equal to 6.5% is considered  diagnostic of diabetes, pending confirmatory testing.  Use of HGBA1C testing  to evaluate glucose control may not be appropriate for patients with  hemoglobin variants or other conditions (e.g. anemia) that alter red blood  cell lifespan.   06/12/2023 09:27 AM 7.9 4.2 - 5.6 % Final     Comment:      Location:[facility], [address] [place] [address] , [place], [address]  Point of care (POC) Hemoglobin A1c (HGBA1C) testing is intended to assess  glucose control and provide a management tool for patients known to have  diabetes and their healthcare providers.  Target HGBA1C levels may depend on  specific clinical circumstances.  POC HGBA1C is not intended for use as a  diagnostic or screening test; laboratory-based testing should be used for  diagnostic purposes.  The following information is supplemental and may not  be applicable to specific diabetes management situations:  The POC device   provides a normal range of 4.2% to 6.5% for the HGBA1C POC test.  However, the American Diabetes Association  guidelines indicate that  patients with HGBA1C in the range of 5.7% to 6.4% are at increased risk for  development of diabetes and that intervention by lifestyle modification may  be beneficial.  A HGBA1C level greater than or equal to 6.5% is considered  diagnostic of diabetes, pending confirmatory testing.  Use of HGBA1C testing  to evaluate glucose control may not be appropriate for patients with  hemoglobin variants or other conditions (e.g. anemia) that alter red blood  cell lifespan.   10/25/2022 07:25 AM 8.7 4.3 - 5.6 % Final     Comment:     American Diabetes Association guidelines indicate that patients with HgbA1c in the range 5.7-6.4% are at increased risk for development of diabetes, and intervention by lifestyle modification may be beneficial. HgbA1c greater or equal to 6.5% is considered diagnostic of diabetes.     VLDL   Date/Time Value Ref Range Status   09/05/2023 10:38 AM 22 0 - 40 mg/dL Final   03/08/2023 08:10 AM 22 0 - 40 mg/dL Final   09/25/2021 07:53 AM 17 0 - 40 mg/dL Final      Last I/O:  I/O last 3 completed shifts:  In: 221 (2.3 mL/kg) [P.O.:221]  Out: - (0 mL/kg)   Weight: 97 kg     Past Cardiology Tests (Last 3 Years):  EKG:  ECG 12 lead (Wet Read) 9/30/2023    Echo:  No  "results found for this or any previous visit from the past 1095 days.    Ejection Fractions:  No results found for: \"EF\"  Cath:  No results found for this or any previous visit from the past 1095 days.    Stress Test:  No results found for this or any previous visit from the past 1095 days.    Cardiac Imaging:  No results found for this or any previous visit from the past 1095 days.      Inpatient Medications:  Scheduled medications   Medication Dose Route Frequency    atorvastatin  80 mg oral Daily    brimonidine  1 drop Both Eyes BID    cloNIDine  0.1 mg oral q12h KYLE    dilTIAZem CD  180 mg oral Daily    furosemide  80 mg intravenous q12h    gabapentin  600 mg oral TID    hydrALAZINE  100 mg oral TID    insulin lispro  0-5 Units subcutaneous TID with meals    insulin lispro  12 Units subcutaneous Once    insulin NPH and regular human  35 Units subcutaneous BID AC    ipratropium-albuteroL  3 mL nebulization TID    metoprolol tartrate  50 mg oral BID    montelukast  10 mg oral Nightly    oxygen   inhalation Continuous - 02/gases    pantoprazole  40 mg oral Daily before breakfast    polyethylene glycol  17 g oral Daily    predniSONE  40 mg oral Daily    primidone  50 mg oral BID    topiramate  50 mg oral BID    warfarin  12.5 mg oral Once per day on Sun Tue Wed Sat    And    [START ON 10/5/2023] warfarin  10 mg oral Once per day on Mon Thu Fri     PRN medications   Medication    acetaminophen    Or    acetaminophen    Or    acetaminophen    benzonatate    dextrose    dextrose    dextrose    dextrose    dextrose    dextrose    dextrose    fluticasone    glucagon    glucagon    glucagon    ipratropium-albuteroL    traMADol     Continuous Medications   Medication Dose Last Rate       Physical Exam:  Pleasant female, alert and oriented x3, NAD  Skin warm and dry  Mild JVD+  Lungs + expiratory wheezing wheezes throughout, orthopnea and lindquist noted, no supplemental oxygen requirements  RRR, S1, S2 heard, no cardiac murmurs " noted  Abdomen soft nontender  Lower extremities, trace lower extremity edema   Appropriate mood and behavior        Assessment/Plan   Lia Jeong is a 73 y.o. female with a past medical history of pulmonary embolism (1994) on coumadin therapy, asthma, hypertension, hyperlipidemia, diabetes mellitus type 2, fibromyalgia, tremor, cataract surgery (9/23), Diastolic HF, presents with complaints of coughing up mucus and shortness of breath.  Initial EKG reveals sinus rhythm heart rate 69, chest x-ray reveals interstitial edema, WBC 11.1, urinalysis+ for moderate leukocytes,  was 43 (5/22), troponin negative x1.  Patient received IV antibiotic therapy, DuoNebs, and Lasix 40 mg IV x1> reports suboptimal urinary response. The decision was made for inpatient admission for further evaluation and treatment of PNA and heart failure.  Cardiology consulted for further evaluation of heart failure.      1.  Acute on chronic diastolic heart failure in the setting of noncompliance with home diuretic therapy and PNA.  Good urinary response s/p metolazone 10/2  -c/w furosemide 80 mg IV twice daily  - 2g Na/ 2000 ml fluid restrictions  -Daily weights, strict I's and O's, monitor renal function, replace electrolytes as needed     2.  Hypertension.  Acceptable given current circumstances     3.  Hyperlipidemia.  Continue statin     4.  Pulmonary embolism history  -On Coumadin therapy> management per primary     Recommendations:  C/w IV diuretic therapy> monitor response  When euvolemic on exam> can transition to home dose torsemide 20 mg p.o. daily    Code Status:  DNR          Hiro Mcfarlane, APRN-CNP

## 2023-10-03 NOTE — CARE PLAN
Over the shift, the patient did not make progress toward the following goals. Barriers to progression include . Recommendations to address these barriers include .  Problem: Safety  Goal: Patient will be injury free during hospitalization  Outcome: Progressing     Problem: Respiratory  Goal: Clear secretions with interventions this shift  Outcome: Progressing  Goal: No signs of respiratory distress (eg. Use of accessory muscles. Peds grunting)  Outcome: Progressing  Goal: Verbalize decreased shortness of breath this shift  Outcome: Progressing     Problem: Patient is at risk for or has history of falls  Goal: Patient will not sustain a fall as a result of home safety mitigation  Outcome: Progressing   The patient's goals for the shift include      The clinical goals for the shift include continue with IV lasix

## 2023-10-03 NOTE — PROGRESS NOTES
"Lia Jeong is a 73 y.o. female on day 3 of admission presenting with with fever, and cold and shortness of breath, she was admitted at community-acquired pneumonia, she is known to have diastolic heart failure, she does have some edema of the legs, and she is on IV Lasix, CAT scan of the chest did not show pneumonia, rather fluid overload, he is also being seen by the cardiologist, she is hoping to go today home,       Subjective   She really wants to go home       Objective     Physical Exam  Constitutional:       Appearance: Normal appearance. She is obese.   HENT:      Head: Normocephalic and atraumatic.   Cardiovascular:      Rate and Rhythm: Normal rate and regular rhythm.   Pulmonary:      Effort: Pulmonary effort is normal.      Breath sounds: Wheezing present.   Abdominal:      General: There is no distension.      Palpations: Abdomen is soft.      Tenderness: There is no abdominal tenderness. There is no guarding.   Musculoskeletal:         General: Swelling present. No tenderness.      Cervical back: Neck supple.   Neurological:      General: No focal deficit present.      Mental Status: She is alert.         Last Recorded Vitals  Blood pressure 124/60, pulse 66, temperature 36.7 °C (98.1 °F), resp. rate 18, height 1.651 m (5' 5\"), weight 96.3 kg (212 lb 4.9 oz), SpO2 96 %.  Intake/Output last 3 Shifts:  I/O last 3 completed shifts:  In: 221 (2.3 mL/kg) [P.O.:221]  Out: - (0 mL/kg)   Weight: 97 kg     atorvastatin, 80 mg, oral, Daily  brimonidine, 1 drop, Both Eyes, BID  cloNIDine, 0.1 mg, oral, q12h KYLE  dilTIAZem ER, 180 mg, oral, Daily  furosemide, 80 mg, intravenous, q12h  gabapentin, 600 mg, oral, TID  hydrALAZINE, 100 mg, oral, TID  insulin lispro, 0-5 Units, subcutaneous, TID with meals  insulin NPH and regular human, 35 Units, subcutaneous, BID AC  ipratropium-albuteroL, 3 mL, nebulization, TID  metoprolol tartrate, 50 mg, oral, BID  montelukast, 10 mg, oral, Nightly  oxygen, , inhalation, " Continuous - 02/gases  pantoprazole, 40 mg, oral, Daily before breakfast  polyethylene glycol, 17 g, oral, Daily  primidone, 50 mg, oral, BID  topiramate, 50 mg, oral, BID  warfarin, 7.5 mg, oral, Daily      Results for orders placed or performed during the hospital encounter of 09/30/23 (from the past 96 hour(s))   SARS-CoV-2 RT PCR   Result Value Ref Range    Coronavirus 2019, PCR Not Detected Not Detected   Influenza A, and B PCR   Result Value Ref Range    Flu A Result Not Detected Not Detected    Flu B Result Not Detected Not Detected   RSV PCR   Result Value Ref Range    RSV PCR Not Detected Not Detected   CBC and Auto Differential   Result Value Ref Range    WBC 11.6 (H) 4.4 - 11.3 x10*3/uL    nRBC 0.0 0.0 - 0.0 /100 WBCs    RBC 4.77 4.00 - 5.20 x10*6/uL    Hemoglobin 12.9 12.0 - 16.0 g/dL    Hematocrit 41.2 36.0 - 46.0 %    MCV 86 80 - 100 fL    MCH 27.0 26.0 - 34.0 pg    MCHC 31.3 (L) 32.0 - 36.0 g/dL    RDW 15.5 (H) 11.5 - 14.5 %    Platelets 297 150 - 450 x10*3/uL    MPV 9.6 7.5 - 11.5 fL    Neutrophils % 78.7 40.0 - 80.0 %    Immature Granulocytes %, Automated 0.3 0.0 - 0.9 %    Lymphocytes % 11.3 13.0 - 44.0 %    Monocytes % 8.4 2.0 - 10.0 %    Eosinophils % 1.0 0.0 - 6.0 %    Basophils % 0.3 0.0 - 2.0 %    Neutrophils Absolute 9.07 (H) 1.60 - 5.50 x10*3/uL    Immature Granulocytes Absolute, Automated 0.04 0.00 - 0.50 x10*3/uL    Lymphocytes Absolute 1.31 0.80 - 3.00 x10*3/uL    Monocytes Absolute 0.97 (H) 0.05 - 0.80 x10*3/uL    Eosinophils Absolute 0.12 0.00 - 0.40 x10*3/uL    Basophils Absolute 0.04 0.00 - 0.10 x10*3/uL   Comprehensive Metabolic Panel   Result Value Ref Range    Glucose 276 (H) 74 - 99 mg/dL    Sodium 138 136 - 145 mmol/L    Potassium 4.2 3.5 - 5.3 mmol/L    Chloride 102 98 - 107 mmol/L    Bicarbonate 31 21 - 32 mmol/L    Anion Gap 9 (L) 10 - 20 mmol/L    Urea Nitrogen 14 6 - 23 mg/dL    Creatinine 0.71 0.50 - 1.05 mg/dL    eGFR 90 >60 mL/min/1.73m*2    Calcium 9.1 8.6 - 10.3 mg/dL     Albumin 4.0 3.4 - 5.0 g/dL    Alkaline Phosphatase 113 33 - 136 U/L    Total Protein 7.2 6.4 - 8.2 g/dL    AST 33 9 - 39 U/L    Bilirubin, Total 0.4 0.0 - 1.2 mg/dL    ALT 45 7 - 45 U/L   Magnesium   Result Value Ref Range    Magnesium 1.70 1.60 - 2.40 mg/dL   B-type natriuretic peptide   Result Value Ref Range     (H) 0 - 99 pg/mL   Coagulation Screen   Result Value Ref Range    Protime 39.0 (H) 9.8 - 12.8 seconds    INR 3.4 (H) 0.9 - 1.1    aPTT 42 (H) 27 - 38 seconds   Troponin I, High Sensitivity, Initial   Result Value Ref Range    Troponin I, High Sensitivity 11 0 - 13 ng/L   Urinalysis with Reflex Microscopic   Result Value Ref Range    Color, Urine Colorless (N) Straw, Yellow    Appearance, Urine Clear Clear    Specific Gravity, Urine 1.004 (N) 1.005 - 1.035    pH, Urine 7.0 5.0, 5.5, 6.0, 6.5, 7.0, 7.5, 8.0    Protein, Urine NEGATIVE NEGATIVE mg/dL    Glucose, Urine 150 (2+) (A) NEGATIVE mg/dL    Blood, Urine NEGATIVE NEGATIVE    Ketones, Urine NEGATIVE NEGATIVE mg/dL    Bilirubin, Urine NEGATIVE NEGATIVE    Urobilinogen, Urine <2.0 <2.0 mg/dL    Nitrite, Urine NEGATIVE NEGATIVE    Leukocyte Esterase, Urine MODERATE (2+) (A) NEGATIVE   Urinalysis Microscopic Only   Result Value Ref Range    WBC, Urine 1-5 1-5, NONE /HPF    RBC, Urine NONE NONE, 1-2, 3-5 /HPF    Squamous Epithelial Cells, Urine NONE Reference range not established. /HPF   Troponin, High Sensitivity, 1 Hour   Result Value Ref Range    Troponin I, High Sensitivity 13 0 - 13 ng/L   CBC and Auto Differential   Result Value Ref Range    WBC 11.1 4.4 - 11.3 x10*3/uL    nRBC 0.0 0.0 - 0.0 /100 WBCs    RBC 4.58 4.00 - 5.20 x10*6/uL    Hemoglobin 12.4 12.0 - 16.0 g/dL    Hematocrit 38.7 36.0 - 46.0 %    MCV 85 80 - 100 fL    MCH 27.1 26.0 - 34.0 pg    MCHC 32.0 32.0 - 36.0 g/dL    RDW 15.3 (H) 11.5 - 14.5 %    Platelets 275 150 - 450 x10*3/uL    MPV 9.9 7.5 - 11.5 fL    Neutrophils % 76.5 40.0 - 80.0 %    Immature Granulocytes %, Automated  0.5 0.0 - 0.9 %    Lymphocytes % 12.6 13.0 - 44.0 %    Monocytes % 8.5 2.0 - 10.0 %    Eosinophils % 1.4 0.0 - 6.0 %    Basophils % 0.5 0.0 - 2.0 %    Neutrophils Absolute 8.47 (H) 1.60 - 5.50 x10*3/uL    Immature Granulocytes Absolute, Automated 0.05 0.00 - 0.50 x10*3/uL    Lymphocytes Absolute 1.39 0.80 - 3.00 x10*3/uL    Monocytes Absolute 0.94 (H) 0.05 - 0.80 x10*3/uL    Eosinophils Absolute 0.16 0.00 - 0.40 x10*3/uL    Basophils Absolute 0.05 0.00 - 0.10 x10*3/uL   Lactate   Result Value Ref Range    Lactate 1.2 0.4 - 2.0 mmol/L   POCT GLUCOSE   Result Value Ref Range    POCT Glucose 317 (H) 74 - 99 mg/dL   Protime-INR   Result Value Ref Range    Protime 35.5 (H) 9.8 - 12.8 seconds    INR 3.1 (H) 0.9 - 1.1   POCT GLUCOSE   Result Value Ref Range    POCT Glucose 87 74 - 99 mg/dL   CBC and Auto Differential   Result Value Ref Range    WBC 9.1 4.4 - 11.3 x10*3/uL    nRBC 0.0 0.0 - 0.0 /100 WBCs    RBC 4.56 4.00 - 5.20 x10*6/uL    Hemoglobin 12.2 12.0 - 16.0 g/dL    Hematocrit 38.0 36.0 - 46.0 %    MCV 83 80 - 100 fL    MCH 26.8 26.0 - 34.0 pg    MCHC 32.1 32.0 - 36.0 g/dL    RDW 15.1 (H) 11.5 - 14.5 %    Platelets 239 150 - 450 x10*3/uL    MPV 9.9 7.5 - 11.5 fL    Neutrophils % 77.3 40.0 - 80.0 %    Immature Granulocytes %, Automated 0.7 0.0 - 0.9 %    Lymphocytes % 12.7 13.0 - 44.0 %    Monocytes % 7.8 2.0 - 10.0 %    Eosinophils % 1.1 0.0 - 6.0 %    Basophils % 0.4 0.0 - 2.0 %    Neutrophils Absolute 7.05 (H) 1.60 - 5.50 x10*3/uL    Immature Granulocytes Absolute, Automated 0.06 0.00 - 0.50 x10*3/uL    Lymphocytes Absolute 1.16 0.80 - 3.00 x10*3/uL    Monocytes Absolute 0.71 0.05 - 0.80 x10*3/uL    Eosinophils Absolute 0.10 0.00 - 0.40 x10*3/uL    Basophils Absolute 0.04 0.00 - 0.10 x10*3/uL   Protime-INR   Result Value Ref Range    Protime 26.8 (H) 9.8 - 12.8 seconds    INR 2.4 (H) 0.9 - 1.1   Basic Metabolic Panel   Result Value Ref Range    Glucose 126 (H) 74 - 99 mg/dL    Sodium 133 (L) 136 - 145 mmol/L     Potassium 3.6 3.5 - 5.3 mmol/L    Chloride 99 98 - 107 mmol/L    Bicarbonate 26 21 - 32 mmol/L    Anion Gap 12 10 - 20 mmol/L    Urea Nitrogen 20 6 - 23 mg/dL    Creatinine 0.77 0.50 - 1.05 mg/dL    eGFR 82 >60 mL/min/1.73m*2    Calcium 8.7 8.6 - 10.3 mg/dL   Magnesium   Result Value Ref Range    Magnesium 1.70 1.60 - 2.40 mg/dL   POCT GLUCOSE   Result Value Ref Range    POCT Glucose 108 (H) 74 - 99 mg/dL   CBC   Result Value Ref Range    WBC 9.5 4.4 - 11.3 x10*3/uL    nRBC 0.0 0.0 - 0.0 /100 WBCs    RBC 4.62 4.00 - 5.20 x10*6/uL    Hemoglobin 12.7 12.0 - 16.0 g/dL    Hematocrit 38.7 36.0 - 46.0 %    MCV 84 80 - 100 fL    MCH 27.5 26.0 - 34.0 pg    MCHC 32.8 32.0 - 36.0 g/dL    RDW 15.0 (H) 11.5 - 14.5 %    Platelets 266 150 - 450 x10*3/uL    MPV 9.6 7.5 - 11.5 fL   Basic metabolic panel   Result Value Ref Range    Glucose 56 (L) 74 - 99 mg/dL    Sodium 134 (L) 136 - 145 mmol/L    Potassium 3.7 3.5 - 5.3 mmol/L    Chloride 99 98 - 107 mmol/L    Bicarbonate 26 21 - 32 mmol/L    Anion Gap 13 10 - 20 mmol/L    Urea Nitrogen 22 6 - 23 mg/dL    Creatinine 0.84 0.50 - 1.05 mg/dL    eGFR 73 >60 mL/min/1.73m*2    Calcium 9.0 8.6 - 10.3 mg/dL   Magnesium   Result Value Ref Range    Magnesium 1.60 1.60 - 2.40 mg/dL   Protime-INR   Result Value Ref Range    Protime 16.9 (H) 9.8 - 12.8 seconds    INR 1.5 (H) 0.9 - 1.1       CT chest w IV contrast    Result Date: 10/2/2023  Interpreted By:  Liliana Phelps, STUDY: CT CHEST W IV CONTRAST;  10/1/2023 12:37 pm   INDICATION: Signs/Symptoms:pna.   COMPARISON: None.   ACCESSION NUMBER(S): [id number]   ORDERING CLINICIAN: CHARLIE BIGGS   TECHNIQUE: CT of the chest was performed. Sagittal and coronal reconstructions were generated.  50 cc Omnipaque intravenous contrast given for the examination.   FINDINGS:     CHEST WALL AND LOWER NECK: Subcentimeter hypodense nodule in the inferior pole of the left thyroid lobe. No significant axillary lymphadenopathy   MEDIASTINUM AND VIET:  Few subcentimeter mediastinal lymph nodes. No significant hilar lymphadenopathy.   HEART AND VESSELS:  The heart is normal in size. No significant pericardial effusion. Multifocal atherosclerotic calcifications including the coronary arteries and aortic valve region.   LUNGS, PLEURA, LARGE AIRWAYS:  Few linear densities scattered in both lung fields. Slight thickening along the right main fissure. No confluent airspace opacity. Trace bilateral pleural effusions/thickening. The central airways are patent.   UPPER ABDOMEN:  No focal mass in the included upper abdominal viscera.   BONES:  Moderate compression deformity of a midthoracic vertebra and adjacent smooth inferior endplate compression deformity consistent with Schmorl's node.       Minimal linear probable scarring or atelectasis scattered in both lung fields and mild pleural/fissural fluid or thickening.   Indeterminate age compression deformity of a midthoracic vertebra.   Subcentimeter hypodense nodule in the left lobe of the thyroid, can not be further characterized by CT. Consider further evaluation ultrasound as clinically warranted.   MACRO: None.   Signed by: Liliana Phelps 10/2/2023 8:50 AM Dictation workstation:   [id number]    XR chest 1 view    Result Date: 9/30/2023  Interpreted By:  Elisa Shaffer, STUDY: XR CHEST 1 VIEW;  9/30/2023 4:45 pm   INDICATION: Signs/Symptoms:cough sputum high wbc.   COMPARISON: Chest x-ray 10/25/2021   ACCESSION NUMBER(S): [id number]   ORDERING CLINICIAN: SOL GALLEGOS   FINDINGS: Study slightly limited by patient rotation.   CARDIOMEDIASTINAL SILHOUETTE: Cardiomediastinal silhouette is borderline enlarged but stable. Atherosclerotic calcification of the aorta.   LUNGS: No consolidation, pleural effusion or pneumothorax. Mild diffuse interstitial prominence.   ABDOMEN: No remarkable upper abdominal findings.   BONES: Multilevel degenerative changes of the spine. Left shoulder osteoarthrosis.       Mild diffuse  interstitial prominence could be chronic or relate to component of developing interstitial edema. Correlate clinically.   MACRO: None   Signed by: Elisa Shaffer 9/30/2023 5:34 PM Dictation workstation:   [id number]    CT head wo IV contrast    Result Date: 9/30/2023  Interpreted By:  Elisa Shaffer, STUDY: CT HEAD WO IV CONTRAST;  9/30/2023 4:27 pm   INDICATION: anticoagulated headache.   COMPARISON: CT scan of the head 05/08/2022.   ACCESSION NUMBER(S): [id number]   ORDERING CLINICIAN: SOL GALLEGOS   TECHNIQUE: Axial noncontrast CT images of the head.   FINDINGS: There is significant artifact centrally limiting evaluation.   BRAIN PARENCHYMA: Gray-white matter interfaces are preserved. No mass, mass effect or midline shift. Mild deep and periventricular white matter hypodensities are nonspecific, but favored to represent chronic small vessel ischemic changes.   HEMORRHAGE: No acute intracranial hemorrhage. VENTRICLES and EXTRA-AXIAL SPACES: Mild volume loss with prominence of the ventricles and sulci. EXTRACRANIAL SOFT TISSUES: Within normal limits. PARANASAL SINUSES/MASTOIDS: The visualized paranasal sinuses and mastoid air cells are aerated. CALVARIUM: No depressed skull fracture. No destructive osseous lesion.   OTHER FINDINGS: Atherosclerotic calcification of the carotid siphons and vertebral arteries.       No acute intracranial abnormality.   Chronic changes as described above.   MACRO: None   Signed by: Elisa Shaffer 9/30/2023 5:32 PM Dictation workstation:   [id number]    Continuous medications     PRN medications  PRN medications: acetaminophen **OR** acetaminophen **OR** acetaminophen, benzonatate, dextrose, dextrose, dextrose, dextrose, dextrose, fluticasone, glucagon, glucagon, ipratropium-albuteroL, traMADol           She is a 73 year old aaf here with fever, and cold and shortness of breath, she was admitted at community-acquired pneumonia, she is known to have diastolic heart failure, she does  have some edema of the legs, and she is on IV Lasix, CAT scan of the chest did not show pneumonia, rather fluid overload, he is also being seen by the cardiologist, she is hoping to go today home,adding prednisone as she has wheezing                    I spent 30 minutes in the professional and overall care of this patient.      Ghazal Gray MD

## 2023-10-04 PROBLEM — J45.909 ASTHMA (HHS-HCC): Chronic | Status: ACTIVE | Noted: 2023-10-04

## 2023-10-04 PROBLEM — E11.9 TYPE 2 DIABETES MELLITUS (MULTI): Chronic | Status: ACTIVE | Noted: 2023-10-04

## 2023-10-04 PROBLEM — I26.99 PULMONARY EMBOLISM (MULTI): Status: ACTIVE | Noted: 2023-10-04

## 2023-10-04 PROBLEM — I50.30 (HFPEF) HEART FAILURE WITH PRESERVED EJECTION FRACTION (MULTI): Chronic | Status: ACTIVE | Noted: 2023-10-04

## 2023-10-04 LAB
ALBUMIN SERPL BCP-MCNC: 3.6 G/DL (ref 3.4–5)
ANION GAP SERPL CALC-SCNC: 13 MMOL/L (ref 10–20)
ANION GAP SERPL CALC-SCNC: 14 MMOL/L (ref 10–20)
BUN SERPL-MCNC: 30 MG/DL (ref 6–23)
BUN SERPL-MCNC: 31 MG/DL (ref 6–23)
CALCIUM SERPL-MCNC: 9.1 MG/DL (ref 8.6–10.3)
CALCIUM SERPL-MCNC: 9.9 MG/DL (ref 8.6–10.3)
CHLORIDE SERPL-SCNC: 89 MMOL/L (ref 98–107)
CHLORIDE SERPL-SCNC: 91 MMOL/L (ref 98–107)
CO2 SERPL-SCNC: 29 MMOL/L (ref 21–32)
CO2 SERPL-SCNC: 31 MMOL/L (ref 21–32)
CREAT SERPL-MCNC: 0.99 MG/DL (ref 0.5–1.05)
CREAT SERPL-MCNC: 1.32 MG/DL (ref 0.5–1.05)
ERYTHROCYTE [DISTWIDTH] IN BLOOD BY AUTOMATED COUNT: 14.8 % (ref 11.5–14.5)
GFR SERPL CREATININE-BSD FRML MDRD: 43 ML/MIN/1.73M*2
GFR SERPL CREATININE-BSD FRML MDRD: 60 ML/MIN/1.73M*2
GLUCOSE BLD MANUAL STRIP-MCNC: 137 MG/DL (ref 74–99)
GLUCOSE BLD MANUAL STRIP-MCNC: 150 MG/DL (ref 74–99)
GLUCOSE BLD MANUAL STRIP-MCNC: 228 MG/DL (ref 74–99)
GLUCOSE BLD MANUAL STRIP-MCNC: 281 MG/DL (ref 74–99)
GLUCOSE BLD MANUAL STRIP-MCNC: 284 MG/DL (ref 74–99)
GLUCOSE BLD MANUAL STRIP-MCNC: 332 MG/DL (ref 74–99)
GLUCOSE BLD MANUAL STRIP-MCNC: 338 MG/DL (ref 74–99)
GLUCOSE BLD MANUAL STRIP-MCNC: 380 MG/DL (ref 74–99)
GLUCOSE BLD MANUAL STRIP-MCNC: 392 MG/DL (ref 74–99)
GLUCOSE BLD MANUAL STRIP-MCNC: 425 MG/DL (ref 74–99)
GLUCOSE BLD MANUAL STRIP-MCNC: 463 MG/DL (ref 74–99)
GLUCOSE BLD MANUAL STRIP-MCNC: 591 MG/DL (ref 74–99)
GLUCOSE BLD MANUAL STRIP-MCNC: 88 MG/DL (ref 74–99)
GLUCOSE SERPL-MCNC: 176 MG/DL (ref 74–99)
GLUCOSE SERPL-MCNC: 312 MG/DL (ref 74–99)
HCT VFR BLD AUTO: 37.6 % (ref 36–46)
HGB BLD-MCNC: 12.6 G/DL (ref 12–16)
INR PPP: 1.3 (ref 0.9–1.1)
MAGNESIUM SERPL-MCNC: 2 MG/DL (ref 1.6–2.4)
MCH RBC QN AUTO: 27 PG (ref 26–34)
MCHC RBC AUTO-ENTMCNC: 33.5 G/DL (ref 32–36)
MCV RBC AUTO: 81 FL (ref 80–100)
NRBC BLD-RTO: 0 /100 WBCS (ref 0–0)
PHOSPHATE SERPL-MCNC: 4.1 MG/DL (ref 2.5–4.9)
PLATELET # BLD AUTO: 304 X10*3/UL (ref 150–450)
PMV BLD AUTO: 9.4 FL (ref 7.5–11.5)
POTASSIUM SERPL-SCNC: 3.1 MMOL/L (ref 3.5–5.3)
POTASSIUM SERPL-SCNC: 4.1 MMOL/L (ref 3.5–5.3)
PROTHROMBIN TIME: 14.7 SECONDS (ref 9.8–12.8)
RBC # BLD AUTO: 4.66 X10*6/UL (ref 4–5.2)
SODIUM SERPL-SCNC: 130 MMOL/L (ref 136–145)
SODIUM SERPL-SCNC: 130 MMOL/L (ref 136–145)
WBC # BLD AUTO: 10.5 X10*3/UL (ref 4.4–11.3)

## 2023-10-04 PROCEDURE — 2500000001 HC RX 250 WO HCPCS SELF ADMINISTERED DRUGS (ALT 637 FOR MEDICARE OP): Performed by: INTERNAL MEDICINE

## 2023-10-04 PROCEDURE — 99233 SBSQ HOSP IP/OBS HIGH 50: CPT

## 2023-10-04 PROCEDURE — 2500000004 HC RX 250 GENERAL PHARMACY W/ HCPCS (ALT 636 FOR OP/ED): Performed by: NURSE PRACTITIONER

## 2023-10-04 PROCEDURE — 36415 COLL VENOUS BLD VENIPUNCTURE: CPT | Performed by: NURSE PRACTITIONER

## 2023-10-04 PROCEDURE — 2500000004 HC RX 250 GENERAL PHARMACY W/ HCPCS (ALT 636 FOR OP/ED)

## 2023-10-04 PROCEDURE — 2500000004 HC RX 250 GENERAL PHARMACY W/ HCPCS (ALT 636 FOR OP/ED): Performed by: INTERNAL MEDICINE

## 2023-10-04 PROCEDURE — 2500000001 HC RX 250 WO HCPCS SELF ADMINISTERED DRUGS (ALT 637 FOR MEDICARE OP): Performed by: PHYSICIAN ASSISTANT

## 2023-10-04 PROCEDURE — 2500000002 HC RX 250 W HCPCS SELF ADMINISTERED DRUGS (ALT 637 FOR MEDICARE OP, ALT 636 FOR OP/ED): Performed by: INTERNAL MEDICINE

## 2023-10-04 PROCEDURE — 94640 AIRWAY INHALATION TREATMENT: CPT

## 2023-10-04 PROCEDURE — 94760 N-INVAS EAR/PLS OXIMETRY 1: CPT

## 2023-10-04 PROCEDURE — 80048 BASIC METABOLIC PNL TOTAL CA: CPT | Performed by: NURSE PRACTITIONER

## 2023-10-04 PROCEDURE — 2500000005 HC RX 250 GENERAL PHARMACY W/O HCPCS: Performed by: INTERNAL MEDICINE

## 2023-10-04 PROCEDURE — 2500000002 HC RX 250 W HCPCS SELF ADMINISTERED DRUGS (ALT 637 FOR MEDICARE OP, ALT 636 FOR OP/ED): Performed by: PHYSICIAN ASSISTANT

## 2023-10-04 PROCEDURE — 1100000001 HC PRIVATE ROOM DAILY

## 2023-10-04 RX ORDER — POTASSIUM CHLORIDE 20 MEQ/1
40 TABLET, EXTENDED RELEASE ORAL 2 TIMES DAILY
Status: DISCONTINUED | OUTPATIENT
Start: 2023-10-04 | End: 2023-10-05 | Stop reason: HOSPADM

## 2023-10-04 RX ORDER — INSULIN LISPRO 100 [IU]/ML
0-10 INJECTION, SOLUTION INTRAVENOUS; SUBCUTANEOUS
Status: DISCONTINUED | OUTPATIENT
Start: 2023-10-04 | End: 2023-10-05 | Stop reason: HOSPADM

## 2023-10-04 RX ORDER — INSULIN LISPRO 100 [IU]/ML
0-10 INJECTION, SOLUTION INTRAVENOUS; SUBCUTANEOUS NIGHTLY
Status: DISCONTINUED | OUTPATIENT
Start: 2023-10-04 | End: 2023-10-05 | Stop reason: HOSPADM

## 2023-10-04 RX ORDER — WARFARIN SODIUM 5 MG/1
5 TABLET ORAL ONCE
Status: DISCONTINUED | OUTPATIENT
Start: 2023-10-04 | End: 2023-10-04 | Stop reason: DRUGHIGH

## 2023-10-04 RX ORDER — TORSEMIDE 20 MG/1
20 TABLET ORAL DAILY
Status: DISCONTINUED | OUTPATIENT
Start: 2023-10-05 | End: 2023-10-05 | Stop reason: HOSPADM

## 2023-10-04 RX ORDER — INSULIN LISPRO 100 [IU]/ML
0-5 INJECTION, SOLUTION INTRAVENOUS; SUBCUTANEOUS
Status: DISCONTINUED | OUTPATIENT
Start: 2023-10-04 | End: 2023-10-04

## 2023-10-04 RX ORDER — POTASSIUM CHLORIDE 20 MEQ/1
40 TABLET, EXTENDED RELEASE ORAL ONCE
Status: DISCONTINUED | OUTPATIENT
Start: 2023-10-04 | End: 2023-10-04

## 2023-10-04 RX ORDER — INSULIN LISPRO 100 [IU]/ML
15 INJECTION, SOLUTION INTRAVENOUS; SUBCUTANEOUS ONCE
Status: COMPLETED | OUTPATIENT
Start: 2023-10-04 | End: 2023-10-04

## 2023-10-04 RX ADMIN — GABAPENTIN 600 MG: 300 CAPSULE ORAL at 09:29

## 2023-10-04 RX ADMIN — BRIMONIDINE TARTRATE 1 DROP: 2 SOLUTION/ DROPS OPHTHALMIC at 22:35

## 2023-10-04 RX ADMIN — DOCUSATE SODIUM 100 MG: 100 CAPSULE, LIQUID FILLED ORAL at 09:33

## 2023-10-04 RX ADMIN — HYDRALAZINE HYDROCHLORIDE 100 MG: 50 TABLET ORAL at 14:43

## 2023-10-04 RX ADMIN — METOPROLOL TARTRATE 50 MG: 25 TABLET, FILM COATED ORAL at 09:29

## 2023-10-04 RX ADMIN — IPRATROPIUM BROMIDE AND ALBUTEROL SULFATE 3 ML: 2.5; .5 SOLUTION RESPIRATORY (INHALATION) at 07:22

## 2023-10-04 RX ADMIN — PRIMIDONE 50 MG: 50 TABLET ORAL at 11:02

## 2023-10-04 RX ADMIN — CLONIDINE HYDROCHLORIDE 0.1 MG: 0.1 TABLET ORAL at 09:30

## 2023-10-04 RX ADMIN — BRIMONIDINE TARTRATE 1 DROP: 2 SOLUTION/ DROPS OPHTHALMIC at 09:28

## 2023-10-04 RX ADMIN — INSULIN LISPRO 10 UNITS: 100 INJECTION, SOLUTION INTRAVENOUS; SUBCUTANEOUS at 17:54

## 2023-10-04 RX ADMIN — CLONIDINE HYDROCHLORIDE 0.1 MG: 0.1 TABLET ORAL at 22:34

## 2023-10-04 RX ADMIN — MONTELUKAST 10 MG: 10 TABLET, FILM COATED ORAL at 22:34

## 2023-10-04 RX ADMIN — POTASSIUM CHLORIDE 40 MEQ: 1500 TABLET, EXTENDED RELEASE ORAL at 09:29

## 2023-10-04 RX ADMIN — Medication 2 L/MIN: at 08:00

## 2023-10-04 RX ADMIN — PRIMIDONE 50 MG: 50 TABLET ORAL at 22:34

## 2023-10-04 RX ADMIN — INSULIN LISPRO 4 UNITS: 100 INJECTION, SOLUTION INTRAVENOUS; SUBCUTANEOUS at 12:13

## 2023-10-04 RX ADMIN — WARFARIN SODIUM 12.5 MG: 2.5 TABLET ORAL at 18:59

## 2023-10-04 RX ADMIN — IPRATROPIUM BROMIDE AND ALBUTEROL SULFATE 3 ML: 2.5; .5 SOLUTION RESPIRATORY (INHALATION) at 13:52

## 2023-10-04 RX ADMIN — IPRATROPIUM BROMIDE AND ALBUTEROL SULFATE 3 ML: 2.5; .5 SOLUTION RESPIRATORY (INHALATION) at 19:35

## 2023-10-04 RX ADMIN — POLYETHYLENE GLYCOL (3350) 17 G: 17 POWDER, FOR SOLUTION ORAL at 09:28

## 2023-10-04 RX ADMIN — DILTIAZEM HYDROCHLORIDE 180 MG: 180 CAPSULE, COATED, EXTENDED RELEASE ORAL at 09:30

## 2023-10-04 RX ADMIN — TOPIRAMATE 50 MG: 25 TABLET, FILM COATED ORAL at 22:33

## 2023-10-04 RX ADMIN — TOPIRAMATE 50 MG: 25 TABLET, FILM COATED ORAL at 09:30

## 2023-10-04 RX ADMIN — HYDRALAZINE HYDROCHLORIDE 100 MG: 50 TABLET ORAL at 09:30

## 2023-10-04 RX ADMIN — GABAPENTIN 600 MG: 300 CAPSULE ORAL at 14:42

## 2023-10-04 RX ADMIN — GABAPENTIN 600 MG: 300 CAPSULE ORAL at 22:33

## 2023-10-04 RX ADMIN — INSULIN LISPRO 10 UNITS: 100 INJECTION, SOLUTION INTRAVENOUS; SUBCUTANEOUS at 22:31

## 2023-10-04 RX ADMIN — FUROSEMIDE 80 MG: 10 INJECTION, SOLUTION INTRAMUSCULAR; INTRAVENOUS at 09:29

## 2023-10-04 RX ADMIN — PREDNISONE 40 MG: 20 TABLET ORAL at 09:29

## 2023-10-04 RX ADMIN — INSULIN HUMAN 35 UNITS: 100 INJECTION, SUSPENSION SUBCUTANEOUS at 06:24

## 2023-10-04 RX ADMIN — HYDRALAZINE HYDROCHLORIDE 100 MG: 50 TABLET ORAL at 22:34

## 2023-10-04 RX ADMIN — DOCUSATE SODIUM 100 MG: 100 CAPSULE, LIQUID FILLED ORAL at 22:33

## 2023-10-04 RX ADMIN — INSULIN HUMAN 35 UNITS: 100 INJECTION, SUSPENSION SUBCUTANEOUS at 17:53

## 2023-10-04 RX ADMIN — METOPROLOL TARTRATE 50 MG: 25 TABLET, FILM COATED ORAL at 22:34

## 2023-10-04 RX ADMIN — PANTOPRAZOLE SODIUM 40 MG: 40 TABLET, DELAYED RELEASE ORAL at 06:16

## 2023-10-04 RX ADMIN — POTASSIUM CHLORIDE 40 MEQ: 1500 TABLET, EXTENDED RELEASE ORAL at 22:33

## 2023-10-04 RX ADMIN — INSULIN LISPRO 3 UNITS: 100 INJECTION, SOLUTION INTRAVENOUS; SUBCUTANEOUS at 06:23

## 2023-10-04 RX ADMIN — ATORVASTATIN CALCIUM 80 MG: 80 TABLET, FILM COATED ORAL at 22:33

## 2023-10-04 RX ADMIN — INSULIN LISPRO 15 UNITS: 100 INJECTION, SOLUTION INTRAVENOUS; SUBCUTANEOUS at 00:24

## 2023-10-04 ASSESSMENT — COGNITIVE AND FUNCTIONAL STATUS - GENERAL
MOBILITY SCORE: 24
DAILY ACTIVITIY SCORE: 24

## 2023-10-04 ASSESSMENT — PAIN SCALES - GENERAL: PAINLEVEL_OUTOF10: 0 - NO PAIN

## 2023-10-04 ASSESSMENT — PAIN - FUNCTIONAL ASSESSMENT: PAIN_FUNCTIONAL_ASSESSMENT: 0-10

## 2023-10-04 NOTE — PROGRESS NOTES
"Lia Jeong is a 73 y.o. female on day 4 of admission presenting with Pulmonary embolism (CMS/HCC).    Subjective   Morning, she would like to go home soon,       Objective     Alert oriented 3.  -Has been asked to find lungs wheezes.  Abdomen is soft.  Legs edema.      Last Recorded Vitals  Blood pressure 122/73, pulse 74, temperature 37.1 °C (98.8 °F), resp. rate 18, height 1.651 m (5' 5\"), weight 97.4 kg (214 lb 11.7 oz), SpO2 94 %.  Intake/Output last 3 Shifts:  I/O last 3 completed shifts:  In: 240 (2.5 mL/kg) [P.O.:240]  Out: 2752 (28.6 mL/kg) [Urine:2750 (0.8 mL/kg/hr); Stool:2]  Weight: 96.3 kg           Scheduled medications  atorvastatin, 80 mg, oral, Daily  brimonidine, 1 drop, Both Eyes, BID  cloNIDine, 0.1 mg, oral, q12h KYLE  dilTIAZem CD, 180 mg, oral, Daily  docusate sodium, 100 mg, oral, BID  gabapentin, 600 mg, oral, TID  hydrALAZINE, 100 mg, oral, TID  insulin lispro, 0-10 Units, subcutaneous, TID with meals  insulin NPH and regular human, 35 Units, subcutaneous, BID AC  ipratropium-albuteroL, 3 mL, nebulization, TID  metoprolol tartrate, 50 mg, oral, BID  montelukast, 10 mg, oral, Nightly  oxygen, , inhalation, Continuous - 02/gases  pantoprazole, 40 mg, oral, Daily before breakfast  polyethylene glycol, 17 g, oral, Daily  potassium chloride CR, 40 mEq, oral, BID  predniSONE, 40 mg, oral, Daily  primidone, 50 mg, oral, BID  topiramate, 50 mg, oral, BID  [START ON 10/5/2023] torsemide, 20 mg, oral, Daily  warfarin, 12.5 mg, oral, Once per day on Sun Tue Wed Sat   And  [START ON 10/5/2023] warfarin, 10 mg, oral, Once per day on Mon Thu Fri  warfarin, 5 mg, oral, Once      Continuous medications     PRN medications  PRN medications: acetaminophen **OR** acetaminophen **OR** acetaminophen, benzonatate, dextrose 10 % in water (D10W), dextrose, fluticasone, glucagon, ipratropium-albuteroL, magnesium hydroxide, traMADol      Results for orders placed or performed during the hospital encounter of " 09/30/23 (from the past 96 hour(s))   Troponin, High Sensitivity, 1 Hour   Result Value Ref Range    Troponin I, High Sensitivity 13 0 - 13 ng/L   CBC and Auto Differential   Result Value Ref Range    WBC 11.1 4.4 - 11.3 x10*3/uL    nRBC 0.0 0.0 - 0.0 /100 WBCs    RBC 4.58 4.00 - 5.20 x10*6/uL    Hemoglobin 12.4 12.0 - 16.0 g/dL    Hematocrit 38.7 36.0 - 46.0 %    MCV 85 80 - 100 fL    MCH 27.1 26.0 - 34.0 pg    MCHC 32.0 32.0 - 36.0 g/dL    RDW 15.3 (H) 11.5 - 14.5 %    Platelets 275 150 - 450 x10*3/uL    MPV 9.9 7.5 - 11.5 fL    Neutrophils % 76.5 40.0 - 80.0 %    Immature Granulocytes %, Automated 0.5 0.0 - 0.9 %    Lymphocytes % 12.6 13.0 - 44.0 %    Monocytes % 8.5 2.0 - 10.0 %    Eosinophils % 1.4 0.0 - 6.0 %    Basophils % 0.5 0.0 - 2.0 %    Neutrophils Absolute 8.47 (H) 1.60 - 5.50 x10*3/uL    Immature Granulocytes Absolute, Automated 0.05 0.00 - 0.50 x10*3/uL    Lymphocytes Absolute 1.39 0.80 - 3.00 x10*3/uL    Monocytes Absolute 0.94 (H) 0.05 - 0.80 x10*3/uL    Eosinophils Absolute 0.16 0.00 - 0.40 x10*3/uL    Basophils Absolute 0.05 0.00 - 0.10 x10*3/uL   Lactate   Result Value Ref Range    Lactate 1.2 0.4 - 2.0 mmol/L   POCT GLUCOSE   Result Value Ref Range    POCT Glucose 317 (H) 74 - 99 mg/dL   Protime-INR   Result Value Ref Range    Protime 35.5 (H) 9.8 - 12.8 seconds    INR 3.1 (H) 0.9 - 1.1   POCT GLUCOSE   Result Value Ref Range    POCT Glucose 332 (H) 74 - 99 mg/dL   POCT GLUCOSE   Result Value Ref Range    POCT Glucose 281 (H) 74 - 99 mg/dL   POCT GLUCOSE   Result Value Ref Range    POCT Glucose 87 74 - 99 mg/dL   CBC and Auto Differential   Result Value Ref Range    WBC 9.1 4.4 - 11.3 x10*3/uL    nRBC 0.0 0.0 - 0.0 /100 WBCs    RBC 4.56 4.00 - 5.20 x10*6/uL    Hemoglobin 12.2 12.0 - 16.0 g/dL    Hematocrit 38.0 36.0 - 46.0 %    MCV 83 80 - 100 fL    MCH 26.8 26.0 - 34.0 pg    MCHC 32.1 32.0 - 36.0 g/dL    RDW 15.1 (H) 11.5 - 14.5 %    Platelets 239 150 - 450 x10*3/uL    MPV 9.9 7.5 - 11.5 fL     Neutrophils % 77.3 40.0 - 80.0 %    Immature Granulocytes %, Automated 0.7 0.0 - 0.9 %    Lymphocytes % 12.7 13.0 - 44.0 %    Monocytes % 7.8 2.0 - 10.0 %    Eosinophils % 1.1 0.0 - 6.0 %    Basophils % 0.4 0.0 - 2.0 %    Neutrophils Absolute 7.05 (H) 1.60 - 5.50 x10*3/uL    Immature Granulocytes Absolute, Automated 0.06 0.00 - 0.50 x10*3/uL    Lymphocytes Absolute 1.16 0.80 - 3.00 x10*3/uL    Monocytes Absolute 0.71 0.05 - 0.80 x10*3/uL    Eosinophils Absolute 0.10 0.00 - 0.40 x10*3/uL    Basophils Absolute 0.04 0.00 - 0.10 x10*3/uL   Protime-INR   Result Value Ref Range    Protime 26.8 (H) 9.8 - 12.8 seconds    INR 2.4 (H) 0.9 - 1.1   Basic Metabolic Panel   Result Value Ref Range    Glucose 126 (H) 74 - 99 mg/dL    Sodium 133 (L) 136 - 145 mmol/L    Potassium 3.6 3.5 - 5.3 mmol/L    Chloride 99 98 - 107 mmol/L    Bicarbonate 26 21 - 32 mmol/L    Anion Gap 12 10 - 20 mmol/L    Urea Nitrogen 20 6 - 23 mg/dL    Creatinine 0.77 0.50 - 1.05 mg/dL    eGFR 82 >60 mL/min/1.73m*2    Calcium 8.7 8.6 - 10.3 mg/dL   Magnesium   Result Value Ref Range    Magnesium 1.70 1.60 - 2.40 mg/dL   POCT GLUCOSE   Result Value Ref Range    POCT Glucose 284 (H) 74 - 99 mg/dL   POCT GLUCOSE   Result Value Ref Range    POCT Glucose 150 (H) 74 - 99 mg/dL   POCT GLUCOSE   Result Value Ref Range    POCT Glucose 108 (H) 74 - 99 mg/dL   CBC   Result Value Ref Range    WBC 9.5 4.4 - 11.3 x10*3/uL    nRBC 0.0 0.0 - 0.0 /100 WBCs    RBC 4.62 4.00 - 5.20 x10*6/uL    Hemoglobin 12.7 12.0 - 16.0 g/dL    Hematocrit 38.7 36.0 - 46.0 %    MCV 84 80 - 100 fL    MCH 27.5 26.0 - 34.0 pg    MCHC 32.8 32.0 - 36.0 g/dL    RDW 15.0 (H) 11.5 - 14.5 %    Platelets 266 150 - 450 x10*3/uL    MPV 9.6 7.5 - 11.5 fL   Basic metabolic panel   Result Value Ref Range    Glucose 56 (L) 74 - 99 mg/dL    Sodium 134 (L) 136 - 145 mmol/L    Potassium 3.7 3.5 - 5.3 mmol/L    Chloride 99 98 - 107 mmol/L    Bicarbonate 26 21 - 32 mmol/L    Anion Gap 13 10 - 20 mmol/L    Urea  Nitrogen 22 6 - 23 mg/dL    Creatinine 0.84 0.50 - 1.05 mg/dL    eGFR 73 >60 mL/min/1.73m*2    Calcium 9.0 8.6 - 10.3 mg/dL   Magnesium   Result Value Ref Range    Magnesium 1.60 1.60 - 2.40 mg/dL   Protime-INR   Result Value Ref Range    Protime 16.9 (H) 9.8 - 12.8 seconds    INR 1.5 (H) 0.9 - 1.1   POCT GLUCOSE   Result Value Ref Range    POCT Glucose 88 74 - 99 mg/dL   POCT GLUCOSE   Result Value Ref Range    POCT Glucose 392 (H) 74 - 99 mg/dL   POCT GLUCOSE   Result Value Ref Range    POCT Glucose 591 (H) 74 - 99 mg/dL   POCT GLUCOSE   Result Value Ref Range    POCT Glucose >600 (H) 74 - 99 mg/dL   POCT GLUCOSE   Result Value Ref Range    POCT Glucose 577 (H) 74 - 99 mg/dL   Renal function panel   Result Value Ref Range    Glucose 536 (HH) 74 - 99 mg/dL    Sodium 124 (L) 136 - 145 mmol/L    Potassium 3.8 3.5 - 5.3 mmol/L    Chloride 90 (L) 98 - 107 mmol/L    Bicarbonate 22 21 - 32 mmol/L    Anion Gap 16 10 - 20 mmol/L    Urea Nitrogen 31 (H) 6 - 23 mg/dL    Creatinine 1.26 (H) 0.50 - 1.05 mg/dL    eGFR 45 (L) >60 mL/min/1.73m*2    Calcium 9.1 8.6 - 10.3 mg/dL    Phosphorus 3.7 2.5 - 4.9 mg/dL    Albumin 3.4 3.4 - 5.0 g/dL   POCT GLUCOSE   Result Value Ref Range    POCT Glucose 380 (H) 74 - 99 mg/dL   POCT GLUCOSE   Result Value Ref Range    POCT Glucose 228 (H) 74 - 99 mg/dL   Basic metabolic panel   Result Value Ref Range    Glucose 176 (H) 74 - 99 mg/dL    Sodium 130 (L) 136 - 145 mmol/L    Potassium 3.1 (L) 3.5 - 5.3 mmol/L    Chloride 91 (L) 98 - 107 mmol/L    Bicarbonate 29 21 - 32 mmol/L    Anion Gap 13 10 - 20 mmol/L    Urea Nitrogen 30 (H) 6 - 23 mg/dL    Creatinine 0.99 0.50 - 1.05 mg/dL    eGFR 60 (L) >60 mL/min/1.73m*2    Calcium 9.9 8.6 - 10.3 mg/dL   Magnesium   Result Value Ref Range    Magnesium 2.00 1.60 - 2.40 mg/dL   CBC   Result Value Ref Range    WBC 10.5 4.4 - 11.3 x10*3/uL    nRBC 0.0 0.0 - 0.0 /100 WBCs    RBC 4.66 4.00 - 5.20 x10*6/uL    Hemoglobin 12.6 12.0 - 16.0 g/dL    Hematocrit 37.6  36.0 - 46.0 %    MCV 81 80 - 100 fL    MCH 27.0 26.0 - 34.0 pg    MCHC 33.5 32.0 - 36.0 g/dL    RDW 14.8 (H) 11.5 - 14.5 %    Platelets 304 150 - 450 x10*3/uL    MPV 9.4 7.5 - 11.5 fL   Protime-INR   Result Value Ref Range    Protime 14.7 (H) 9.8 - 12.8 seconds    INR 1.3 (H) 0.9 - 1.1   POCT GLUCOSE   Result Value Ref Range    POCT Glucose 137 (H) 74 - 99 mg/dL   POCT GLUCOSE   Result Value Ref Range    POCT Glucose 338 (H) 74 - 99 mg/dL   Renal Function Panel   Result Value Ref Range    Glucose 312 (H) 74 - 99 mg/dL    Sodium 130 (L) 136 - 145 mmol/L    Potassium 4.1 3.5 - 5.3 mmol/L    Chloride 89 (L) 98 - 107 mmol/L    Bicarbonate 31 21 - 32 mmol/L    Anion Gap 14 10 - 20 mmol/L    Urea Nitrogen 31 (H) 6 - 23 mg/dL    Creatinine 1.32 (H) 0.50 - 1.05 mg/dL    eGFR 43 (L) >60 mL/min/1.73m*2    Calcium 9.1 8.6 - 10.3 mg/dL    Phosphorus 4.1 2.5 - 4.9 mg/dL    Albumin 3.6 3.4 - 5.0 g/dL   POCT GLUCOSE   Result Value Ref Range    POCT Glucose 463 (H) 74 - 99 mg/dL                  Assessment/Plan   Principal Problem:    Pulmonary embolism (CMS/Former[facility])  Active Problems:    CAP (community acquired pneumonia) due to Chlamydia species    Type 2 diabetes mellitus (CMS/[facility])    (HFpEF) heart failure with preserved ejection fraction (CMS/[facility])    he is a 73 year old aaf here with fever, and cold and shortness of breath, she was admitted at community-acquired pneumonia, she is known to have diastolic heart failure, she does have some edema of the legs, and she is on IV Lasix, CAT scan of the chest did not show pneumonia, rather fluid overload, he is also being seen by the cardiologist, she is hoping to go today home,adding prednisone as she has wheezing           I spent 30 minutes in the professional and overall care of this patient.      Ghazal Gray MD

## 2023-10-04 NOTE — CARE PLAN
The patient's goals for the shift include      The clinical goals for the shift include continue diuresis    Over the shift, the patient will make progress toward the following goals.

## 2023-10-04 NOTE — PROGRESS NOTES
Subjective Data:  Patient resting, no c/o chest pain or dyspnea at rest. Reports she feels tired. No c/o orthopnea or pnd.    Continues to have a cough with wheezes throughout.    Overnight Events:    None over night     Objective Data:  Last Recorded Vitals:  Vitals:    10/04/23 0824 10/04/23 1108 10/04/23 1352 10/04/23 1500   BP:  107/70  122/73   BP Location:  Left arm  Right arm   Patient Position:  Sitting  Lying   Pulse:  72  74   Resp:  18  18   Temp:  37 °C (98.6 °F)  37.1 °C (98.8 °F)   TempSrc:  Skin     SpO2:   95% 94%   Weight: 97.4 kg (214 lb 11.7 oz)      Height:           Last Labs:  CBC - 10/4/2023:  7:23 AM  10.5 12.6 304    37.6      CMP - 10/4/2023: 11:43 AM  9.1 7.2 33 --- 0.4   4.1 3.6 45 113      PTT - 9/30/2023:  2:19 PM  1.3   14.7 42     TROPHS   Date/Time Value Ref Range Status   10/01/2023 06:32 AM 13 0 - 13 ng/L Final   09/30/2023 02:19 PM 11 0 - 13 ng/L Final   05/19/2022 07:06 AM 11 0 - 13 ng/L Final     Comment:     .  Less than 99th percentile of normal range cutoff-  Female and children under 18 years old <14 ng/L; Male <21 ng/L: Negative  Repeat testing should be performed if clinically indicated.   .  Female and children under 18 years old 14-50 ng/L; Male 21-50 ng/L:  Consistent with possible cardiac damage and possible increased clinical   risk. Serial measurements may help to assess extent of myocardial damage.   .  >50 ng/L: Consistent with cardiac damage, increased clinical risk and  myocardial infarction. Serial measurements may help assess extent of   myocardial damage.   .   NOTE: Children less than 1 year old may have higher baseline troponin   levels and results should be interpreted in conjunction with the overall   clinical context.   .  NOTE: Troponin I testing is performed using a different   testing methodology at [facility] than at other   [facility] hospitals. Direct result comparisons should only   be made within the same method.       BNP   Date/Time  Value Ref Range Status   09/30/2023 02:19  0 - 99 pg/mL Final   05/17/2022 09:08 PM 43 0 - 99 pg/mL Final     Comment:     .  <100 pg/mL - Heart failure unlikely  100-299 pg/mL - Intermediate probability of acute heart  .               failure exacerbation. Correlate with clinical  .               context and patient history.    >=300 pg/mL - Heart Failure likely. Correlate with clinical  .               context and patient history.  BNP testing is performed using different testing   methodology at [facility] than at other   [facility]. Direct result comparisons should   only be made within the same method.       HGBA1C   Date/Time Value Ref Range Status   09/12/2023 09:20 AM 8.4 4.2 - 5.6 % Final     Comment:     Location:[facility], [address] [place] [address] [address], [place], [address]  Point of care (POC) Hemoglobin A1c (HGBA1C) testing is intended to assess  glucose control and provide a management tool for patients known to have  diabetes and their healthcare providers.  Target HGBA1C levels may depend on  specific clinical circumstances.  POC HGBA1C is not intended for use as a  diagnostic or screening test; laboratory-based testing should be used for  diagnostic purposes.  The following information is supplemental and may not  be applicable to specific diabetes management situations:  The POC device   provides a normal range of 4.2% to 6.5% for the HGBA1C POC test.  However, the American Diabetes Association  guidelines indicate that  patients with HGBA1C in the range of 5.7% to 6.4% are at increased risk for  development of diabetes and that intervention by lifestyle modification may  be beneficial.  A HGBA1C level greater than or equal to 6.5% is considered  diagnostic of diabetes, pending confirmatory testing.  Use of HGBA1C testing  to evaluate glucose control may not be appropriate for patients with  hemoglobin variants or other conditions (e.g. anemia) that alter  red blood  cell lifespan.   06/12/2023 09:27 AM 7.9 4.2 - 5.6 % Final     Comment:     Location:[facility], [facility], [place], [address]  Point of care (POC) Hemoglobin A1c (HGBA1C) testing is intended to assess  glucose control and provide a management tool for patients known to have  diabetes and their healthcare providers.  Target HGBA1C levels may depend on  specific clinical circumstances.  POC HGBA1C is not intended for use as a  diagnostic or screening test; laboratory-based testing should be used for  diagnostic purposes.  The following information is supplemental and may not  be applicable to specific diabetes management situations:  The POC device   provides a normal range of 4.2% to 6.5% for the HGBA1C POC test.  However, the American Diabetes Association  guidelines indicate that  patients with HGBA1C in the range of 5.7% to 6.4% are at increased risk for  development of diabetes and that intervention by lifestyle modification may  be beneficial.  A HGBA1C level greater than or equal to 6.5% is considered  diagnostic of diabetes, pending confirmatory testing.  Use of HGBA1C testing  to evaluate glucose control may not be appropriate for patients with  hemoglobin variants or other conditions (e.g. anemia) that alter red blood  cell lifespan.   10/25/2022 07:25 AM 8.7 4.3 - 5.6 % Final     Comment:     American Diabetes Association guidelines indicate that patients with HgbA1c in the range 5.7-6.4% are at increased risk for development of diabetes, and intervention by lifestyle modification may be beneficial. HgbA1c greater or equal to 6.5% is considered diagnostic of diabetes.     VLDL   Date/Time Value Ref Range Status   09/05/2023 10:38 AM 22 0 - 40 mg/dL Final   03/08/2023 08:10 AM 22 0 - 40 mg/dL Final   09/25/2021 07:53 AM 17 0 - 40 mg/dL Final      Last I/O:  I/O last 3 completed shifts:  In: 240 (2.5 mL/kg) [P.O.:240]  Out: 2752 (28.6 mL/kg) [Urine:2750 (0.8  mL/kg/hr); Stool:2]  Weight: 96.3 kg     Inpatient Medications:  Scheduled medications   Medication Dose Route Frequency    atorvastatin  80 mg oral Daily    brimonidine  1 drop Both Eyes BID    cloNIDine  0.1 mg oral q12h KYLE    dilTIAZem CD  180 mg oral Daily    docusate sodium  100 mg oral BID    furosemide  80 mg intravenous q12h    gabapentin  600 mg oral TID    hydrALAZINE  100 mg oral TID    insulin lispro  0-5 Units subcutaneous Before meals & nightly    insulin NPH and regular human  35 Units subcutaneous BID AC    ipratropium-albuteroL  3 mL nebulization TID    metoprolol tartrate  50 mg oral BID    montelukast  10 mg oral Nightly    oxygen   inhalation Continuous - 02/gases    pantoprazole  40 mg oral Daily before breakfast    polyethylene glycol  17 g oral Daily    potassium chloride CR  40 mEq oral BID    predniSONE  40 mg oral Daily    primidone  50 mg oral BID    topiramate  50 mg oral BID    warfarin  12.5 mg oral Once per day on Sun Tue Wed Sat    And    [START ON 10/5/2023] warfarin  10 mg oral Once per day on Mon Thu Fri     PRN medications   Medication    acetaminophen    Or    acetaminophen    Or    acetaminophen    benzonatate    dextrose 10 % in water (D10W)    dextrose    fluticasone    glucagon    ipratropium-albuteroL    magnesium hydroxide    traMADol     Continuous Medications   Medication Dose Last Rate       Physical Exam:  Pleasant female, alert and oriented x3, NAD  Skin warm and dry  No JVD  Lungs + inspiratory and expiratory wheezes throughout,  no supplemental oxygen requirements  RRR, S1, S2 heard, no cardiac murmurs noted  Abdomen soft nontender  Trace Lower extremity edema  Appropriate mood and behavior           Assessment/Plan   Lia Jeong is a 73 y.o. female with a past medical history of pulmonary embolism (1994) on coumadin therapy, asthma, hypertension, hyperlipidemia, diabetes mellitus type 2, fibromyalgia, tremor, cataract surgery (9/23), Diastolic HF, presents with  complaints of coughing up mucus and shortness of breath.  Initial EKG reveals sinus rhythm heart rate 69, chest x-ray reveals interstitial edema, WBC 11.1, urinalysis+ for moderate leukocytes,  was 43 (5/22), troponin negative x1.  Patient received IV antibiotic therapy, DuoNebs, and Lasix 40 mg IV x1> reports suboptimal urinary response. The decision was made for inpatient admission for further evaluation and treatment of PNA and heart failure.  Cardiology consulted for further evaluation of heart failure.      1.  Acute on chronic diastolic heart failure in the setting of noncompliance with home diuretic therapy and PNA.  Good urinary response s/p metolazone 10/2  -Appears euvolemic on exam> will transition to Torsemide 20 mg PO Daily to start tomorrow  - 2g Na/ 2000 ml fluid restrictions  -Daily weights, strict I's and O's, monitor renal function, replace electrolytes as needed     2.  Hypertension.  Acceptable given current circumstances     3.  Hyperlipidemia.  Continue statin     4.  Pulmonary embolism history  -On Coumadin therapy> management per primary     Recommendations:  Patient euvolemic on exam> reports optimal urinary response to diuretic therapy  Transition to oral diuretics tomorrow as ordered  Wheezing appears to be more pulmonary in nature  Consider Pulmonary consult d/t continued wheezes/ dyspnea in setting of PNA  Patient will benefit from Cardiology follow up with her [facility] cardiologist as  scheduled post discharge  Cardiology will sign off  Please call with questions                 MINDY Farrar-CNS

## 2023-10-04 NOTE — NURSING NOTE
1520-Messaged Dr Gray about elevated blood sugar.  1535-Patients insulin sliding scale adjusted and updated  on MAR.

## 2023-10-04 NOTE — PROGRESS NOTES
"Lia Jeong is a 73 y.o. female on day 4 of admission presenting with Pulmonary embolism (CMS/HCC).    Objective     Physical Exam    Last Recorded Vitals  Blood pressure 122/73, pulse 74, temperature 37.1 °C (98.8 °F), resp. rate 18, height 1.651 m (5' 5\"), weight 97.4 kg (214 lb 11.7 oz), SpO2 94 %.  Intake/Output last 3 Shifts:  I/O last 3 completed shifts:  In: 240 (2.5 mL/kg) [P.O.:240]  Out: 3852 (39.5 mL/kg) [Urine:3850 (1.1 mL/kg/hr); Stool:2]  Weight: 97.4 kg     Relevant Results  Patient wristband unable to be read by glucometer. Overrode glucometer to test patient blood sugar. Test result 529. Notifying provider (hospitalist)      HIREN VANG RN      "

## 2023-10-04 NOTE — HOSPITAL COURSE
Lia Jeong is a 73 y.o.  with past medical history of Asthma, Diabetes mellitus (CMS/HCC), and Pulmonary embolus (CMS/HCC) presenting with she is known to have multiple medical problems including pulmonary embolism history of DVT on long-term warfarin history of diabetes seeing endocrinology history of hypertension well-controlled dose.  Cardiologist as an outpatient history of asthma never a smoker history of COPD hyperlipidemia obstructive sleep apnea.  And diabetic neuropathy, she does take care of her grandchildren, she started having severe cough cold and she thought it was a sinus, but yesterday she got worse, she was short of breath, she came to the emergency department, in the emergency room physician had imaging done, she was diagnosed with pneumonia and congestive heart failure, as she was noted to be having leg edema, she does have some chronic leg edema, she is being admitted here for further management.

## 2023-10-04 NOTE — CONSULTS
Inpatient consult to cardiology  Consult performed by: WINNIE Daniels  Consult ordered by: Ghazal Gray MD  Reason for consult: Seen by treovr 10/1  Assessment/Recommendations: Seen by trevor 10/1         Seen by trevor 10/1                 WINNIE Daniels

## 2023-10-05 VITALS
RESPIRATION RATE: 18 BRPM | DIASTOLIC BLOOD PRESSURE: 78 MMHG | HEART RATE: 77 BPM | TEMPERATURE: 98.5 F | WEIGHT: 216.71 LBS | OXYGEN SATURATION: 93 % | HEIGHT: 65 IN | BODY MASS INDEX: 36.11 KG/M2 | SYSTOLIC BLOOD PRESSURE: 135 MMHG

## 2023-10-05 PROBLEM — J16.0 CAP (COMMUNITY ACQUIRED PNEUMONIA) DUE TO CHLAMYDIA SPECIES: Status: RESOLVED | Noted: 2023-09-30 | Resolved: 2023-10-05

## 2023-10-05 PROBLEM — E88.819 INSULIN RESISTANCE: Status: ACTIVE | Noted: 2020-05-18

## 2023-10-05 PROBLEM — E66.812 OBESITY, CLASS II, BMI 35-39.9: Status: ACTIVE | Noted: 2018-05-21

## 2023-10-05 PROBLEM — H52.223 REGULAR ASTIGMATISM OF BOTH EYES WITH PRESBYOPIA: Status: ACTIVE | Noted: 2023-10-05

## 2023-10-05 PROBLEM — E55.9 VITAMIN D DEFICIENCY, UNSPECIFIED: Status: ACTIVE | Noted: 2018-08-01

## 2023-10-05 PROBLEM — E66.9 OBESITY, CLASS II, BMI 35-39.9: Status: ACTIVE | Noted: 2018-05-21

## 2023-10-05 PROBLEM — S34.123A: Status: ACTIVE | Noted: 2017-09-27

## 2023-10-05 PROBLEM — H40.10X0 OPEN-ANGLE GLAUCOMA: Status: ACTIVE | Noted: 2023-10-05

## 2023-10-05 PROBLEM — Z86.718 H/O BLOOD CLOTS: Status: ACTIVE | Noted: 2023-10-05

## 2023-10-05 PROBLEM — H04.123 DRY EYE SYNDROME, BILATERAL: Status: ACTIVE | Noted: 2023-10-05

## 2023-10-05 PROBLEM — H52.4 REGULAR ASTIGMATISM OF BOTH EYES WITH PRESBYOPIA: Status: ACTIVE | Noted: 2023-10-05

## 2023-10-05 PROBLEM — H52.03 HYPEROPIA OF BOTH EYES WITH REGULAR ASTIGMATISM: Status: ACTIVE | Noted: 2023-10-05

## 2023-10-05 PROBLEM — E78.2 MIXED HYPERLIPIDEMIA: Status: ACTIVE | Noted: 2023-10-05

## 2023-10-05 PROBLEM — G62.9 SMALL FIBER NEUROPATHY: Status: ACTIVE | Noted: 2018-11-26

## 2023-10-05 PROBLEM — H52.223 HYPEROPIA OF BOTH EYES WITH REGULAR ASTIGMATISM: Status: ACTIVE | Noted: 2023-10-05

## 2023-10-05 PROBLEM — G25.0 ESSENTIAL TREMOR: Status: ACTIVE | Noted: 2018-04-05

## 2023-10-05 PROBLEM — H52.13 BILATERAL MYOPIA: Status: ACTIVE | Noted: 2023-10-05

## 2023-10-05 PROBLEM — E11.49 DIABETES MELLITUS WITH NEUROLOGICAL MANIFESTATIONS (MULTI): Status: ACTIVE | Noted: 2023-10-05

## 2023-10-05 PROBLEM — G56.01 CARPAL TUNNEL SYNDROME OF RIGHT WRIST: Status: ACTIVE | Noted: 2018-11-26

## 2023-10-05 PROBLEM — M50.120: Status: ACTIVE | Noted: 2018-04-10

## 2023-10-05 PROBLEM — H25.13 CATARACT, NUCLEAR SCLEROTIC, BOTH EYES: Status: ACTIVE | Noted: 2023-10-05

## 2023-10-05 PROBLEM — R07.9 CHEST PAIN: Status: ACTIVE | Noted: 2023-10-05

## 2023-10-05 LAB
ANION GAP SERPL CALC-SCNC: 13 MMOL/L (ref 10–20)
BACTERIA BLD CULT: NORMAL
BACTERIA BLD CULT: NORMAL
BUN SERPL-MCNC: 31 MG/DL (ref 6–23)
CALCIUM SERPL-MCNC: 9.1 MG/DL (ref 8.6–10.3)
CHLORIDE SERPL-SCNC: 96 MMOL/L (ref 98–107)
CO2 SERPL-SCNC: 27 MMOL/L (ref 21–32)
CREAT SERPL-MCNC: 0.9 MG/DL (ref 0.5–1.05)
ERYTHROCYTE [DISTWIDTH] IN BLOOD BY AUTOMATED COUNT: 14.7 % (ref 11.5–14.5)
GFR SERPL CREATININE-BSD FRML MDRD: 68 ML/MIN/1.73M*2
GLUCOSE BLD MANUAL STRIP-MCNC: 176 MG/DL (ref 74–99)
GLUCOSE BLD MANUAL STRIP-MCNC: 368 MG/DL (ref 74–99)
GLUCOSE BLD MANUAL STRIP-MCNC: 491 MG/DL (ref 74–99)
GLUCOSE BLD MANUAL STRIP-MCNC: 529 MG/DL (ref 74–99)
GLUCOSE BLD MANUAL STRIP-MCNC: 588 MG/DL (ref 74–99)
GLUCOSE SERPL-MCNC: 166 MG/DL (ref 74–99)
HCT VFR BLD AUTO: 38.7 % (ref 36–46)
HGB BLD-MCNC: 12.4 G/DL (ref 12–16)
INR PPP: 1.6 (ref 0.9–1.1)
MAGNESIUM SERPL-MCNC: 1.9 MG/DL (ref 1.6–2.4)
MCH RBC QN AUTO: 26.3 PG (ref 26–34)
MCHC RBC AUTO-ENTMCNC: 32 G/DL (ref 32–36)
MCV RBC AUTO: 82 FL (ref 80–100)
NRBC BLD-RTO: 0 /100 WBCS (ref 0–0)
PLATELET # BLD AUTO: 302 X10*3/UL (ref 150–450)
PMV BLD AUTO: 9.7 FL (ref 7.5–11.5)
POTASSIUM SERPL-SCNC: 3.9 MMOL/L (ref 3.5–5.3)
PROTHROMBIN TIME: 17.6 SECONDS (ref 9.8–12.8)
RBC # BLD AUTO: 4.72 X10*6/UL (ref 4–5.2)
SODIUM SERPL-SCNC: 132 MMOL/L (ref 136–145)
WBC # BLD AUTO: 9.1 X10*3/UL (ref 4.4–11.3)

## 2023-10-05 PROCEDURE — 2500000001 HC RX 250 WO HCPCS SELF ADMINISTERED DRUGS (ALT 637 FOR MEDICARE OP): Performed by: PHYSICIAN ASSISTANT

## 2023-10-05 PROCEDURE — 80048 BASIC METABOLIC PNL TOTAL CA: CPT | Performed by: NURSE PRACTITIONER

## 2023-10-05 PROCEDURE — 2500000001 HC RX 250 WO HCPCS SELF ADMINISTERED DRUGS (ALT 637 FOR MEDICARE OP)

## 2023-10-05 PROCEDURE — 2500000004 HC RX 250 GENERAL PHARMACY W/ HCPCS (ALT 636 FOR OP/ED): Performed by: INTERNAL MEDICINE

## 2023-10-05 PROCEDURE — 36415 COLL VENOUS BLD VENIPUNCTURE: CPT | Performed by: NURSE PRACTITIONER

## 2023-10-05 PROCEDURE — 85610 PROTHROMBIN TIME: CPT | Performed by: NURSE PRACTITIONER

## 2023-10-05 PROCEDURE — 2500000005 HC RX 250 GENERAL PHARMACY W/O HCPCS: Performed by: INTERNAL MEDICINE

## 2023-10-05 PROCEDURE — 94760 N-INVAS EAR/PLS OXIMETRY 1: CPT

## 2023-10-05 PROCEDURE — 94640 AIRWAY INHALATION TREATMENT: CPT

## 2023-10-05 PROCEDURE — 2500000004 HC RX 250 GENERAL PHARMACY W/ HCPCS (ALT 636 FOR OP/ED): Performed by: NURSE PRACTITIONER

## 2023-10-05 PROCEDURE — 2500000002 HC RX 250 W HCPCS SELF ADMINISTERED DRUGS (ALT 637 FOR MEDICARE OP, ALT 636 FOR OP/ED): Performed by: INTERNAL MEDICINE

## 2023-10-05 PROCEDURE — 2500000001 HC RX 250 WO HCPCS SELF ADMINISTERED DRUGS (ALT 637 FOR MEDICARE OP): Performed by: INTERNAL MEDICINE

## 2023-10-05 PROCEDURE — 85027 COMPLETE CBC AUTOMATED: CPT | Performed by: NURSE PRACTITIONER

## 2023-10-05 PROCEDURE — 83735 ASSAY OF MAGNESIUM: CPT | Performed by: NURSE PRACTITIONER

## 2023-10-05 PROCEDURE — 82947 ASSAY GLUCOSE BLOOD QUANT: CPT

## 2023-10-05 RX ORDER — POTASSIUM CHLORIDE 20 MEQ/1
40 TABLET, EXTENDED RELEASE ORAL DAILY
Qty: 60 TABLET | Refills: 0 | Status: SHIPPED | OUTPATIENT
Start: 2023-10-05 | End: 2023-11-04

## 2023-10-05 RX ORDER — PIOGLITAZONEHYDROCHLORIDE 15 MG/1
15 TABLET ORAL DAILY
COMMUNITY

## 2023-10-05 RX ORDER — PREDNISOLONE ACETATE 10 MG/ML
1 SUSPENSION/ DROPS OPHTHALMIC
COMMUNITY
Start: 2023-09-13

## 2023-10-05 RX ORDER — TORSEMIDE 20 MG/1
20 TABLET ORAL DAILY
Status: ON HOLD | COMMUNITY
End: 2023-10-05 | Stop reason: SDUPTHER

## 2023-10-05 RX ORDER — WARFARIN SODIUM 5 MG/1
10 TABLET ORAL 3 TIMES WEEKLY
COMMUNITY

## 2023-10-05 RX ORDER — TORSEMIDE 20 MG/1
20 TABLET ORAL DAILY
Qty: 30 TABLET | Refills: 0 | Status: SHIPPED | OUTPATIENT
Start: 2023-10-05 | End: 2023-11-04

## 2023-10-05 RX ORDER — PREDNISONE 20 MG/1
TABLET ORAL
Qty: 3 TABLET | Refills: 0 | Status: SHIPPED | OUTPATIENT
Start: 2023-10-05 | End: 2023-10-09

## 2023-10-05 RX ORDER — WARFARIN SODIUM 5 MG/1
12.5 TABLET ORAL
COMMUNITY

## 2023-10-05 RX ORDER — HYDRALAZINE HYDROCHLORIDE 50 MG/1
25 TABLET, FILM COATED ORAL 3 TIMES DAILY
Qty: 45 TABLET | Refills: 0 | Status: SHIPPED | OUTPATIENT
Start: 2023-10-05 | End: 2023-11-04

## 2023-10-05 RX ADMIN — PANTOPRAZOLE SODIUM 40 MG: 40 TABLET, DELAYED RELEASE ORAL at 05:55

## 2023-10-05 RX ADMIN — POLYETHYLENE GLYCOL (3350) 17 G: 17 POWDER, FOR SOLUTION ORAL at 08:32

## 2023-10-05 RX ADMIN — INSULIN LISPRO 10 UNITS: 100 INJECTION, SOLUTION INTRAVENOUS; SUBCUTANEOUS at 13:11

## 2023-10-05 RX ADMIN — TOPIRAMATE 50 MG: 25 TABLET, FILM COATED ORAL at 08:29

## 2023-10-05 RX ADMIN — HYDRALAZINE HYDROCHLORIDE 100 MG: 50 TABLET ORAL at 14:24

## 2023-10-05 RX ADMIN — POTASSIUM CHLORIDE 40 MEQ: 1500 TABLET, EXTENDED RELEASE ORAL at 08:32

## 2023-10-05 RX ADMIN — IPRATROPIUM BROMIDE AND ALBUTEROL SULFATE 3 ML: 2.5; .5 SOLUTION RESPIRATORY (INHALATION) at 07:23

## 2023-10-05 RX ADMIN — BRIMONIDINE TARTRATE 1 DROP: 2 SOLUTION/ DROPS OPHTHALMIC at 08:26

## 2023-10-05 RX ADMIN — PREDNISONE 40 MG: 20 TABLET ORAL at 08:30

## 2023-10-05 RX ADMIN — INSULIN HUMAN 35 UNITS: 100 INJECTION, SUSPENSION SUBCUTANEOUS at 08:49

## 2023-10-05 RX ADMIN — TORSEMIDE 20 MG: 20 TABLET ORAL at 08:27

## 2023-10-05 RX ADMIN — GABAPENTIN 600 MG: 300 CAPSULE ORAL at 08:27

## 2023-10-05 RX ADMIN — INSULIN LISPRO 10 UNITS: 100 INJECTION, SOLUTION INTRAVENOUS; SUBCUTANEOUS at 16:24

## 2023-10-05 RX ADMIN — INSULIN HUMAN 35 UNITS: 100 INJECTION, SUSPENSION SUBCUTANEOUS at 16:29

## 2023-10-05 RX ADMIN — WARFARIN SODIUM 10 MG: 5 TABLET ORAL at 18:00

## 2023-10-05 RX ADMIN — Medication: at 08:39

## 2023-10-05 RX ADMIN — DOCUSATE SODIUM 100 MG: 100 CAPSULE, LIQUID FILLED ORAL at 08:31

## 2023-10-05 RX ADMIN — INSULIN LISPRO 2 UNITS: 100 INJECTION, SOLUTION INTRAVENOUS; SUBCUTANEOUS at 08:34

## 2023-10-05 RX ADMIN — GABAPENTIN 600 MG: 300 CAPSULE ORAL at 14:24

## 2023-10-05 RX ADMIN — IPRATROPIUM BROMIDE AND ALBUTEROL SULFATE 3 ML: 2.5; .5 SOLUTION RESPIRATORY (INHALATION) at 13:40

## 2023-10-05 RX ADMIN — PRIMIDONE 50 MG: 50 TABLET ORAL at 08:32

## 2023-10-05 SDOH — SOCIAL STABILITY: SOCIAL INSECURITY: HAS ANYONE EVER THREATENED TO HURT YOUR FAMILY OR YOUR PETS?: NO

## 2023-10-05 SDOH — SOCIAL STABILITY: SOCIAL INSECURITY: ARE THERE ANY APPARENT SIGNS OF INJURIES/BEHAVIORS THAT COULD BE RELATED TO ABUSE/NEGLECT?: NO

## 2023-10-05 SDOH — SOCIAL STABILITY: SOCIAL INSECURITY: WERE YOU ABLE TO COMPLETE ALL THE BEHAVIORAL HEALTH SCREENINGS?: YES

## 2023-10-05 SDOH — SOCIAL STABILITY: SOCIAL INSECURITY: DO YOU FEEL ANYONE HAS EXPLOITED OR TAKEN ADVANTAGE OF YOU FINANCIALLY OR OF YOUR PERSONAL PROPERTY?: NO

## 2023-10-05 SDOH — SOCIAL STABILITY: SOCIAL INSECURITY: DOES ANYONE TRY TO KEEP YOU FROM HAVING/CONTACTING OTHER FRIENDS OR DOING THINGS OUTSIDE YOUR HOME?: NO

## 2023-10-05 SDOH — SOCIAL STABILITY: SOCIAL INSECURITY: ABUSE: ADULT

## 2023-10-05 SDOH — SOCIAL STABILITY: SOCIAL INSECURITY: DO YOU FEEL UNSAFE GOING BACK TO THE PLACE WHERE YOU ARE LIVING?: NO

## 2023-10-05 SDOH — SOCIAL STABILITY: SOCIAL INSECURITY: ARE YOU OR HAVE YOU BEEN THREATENED OR ABUSED PHYSICALLY, EMOTIONALLY, OR SEXUALLY BY ANYONE?: NO

## 2023-10-05 ASSESSMENT — COGNITIVE AND FUNCTIONAL STATUS - GENERAL
PATIENT BASELINE BEDBOUND: NO
DAILY ACTIVITIY SCORE: 24
MOBILITY SCORE: 24
MOBILITY SCORE: 24
DAILY ACTIVITIY SCORE: 24

## 2023-10-05 ASSESSMENT — ACTIVITIES OF DAILY LIVING (ADL)
HEARING - LEFT EAR: FUNCTIONAL
HEARING - RIGHT EAR: FUNCTIONAL
ADEQUATE_TO_COMPLETE_ADL: YES
BATHING: INDEPENDENT
PATIENT'S MEMORY ADEQUATE TO SAFELY COMPLETE DAILY ACTIVITIES?: YES
JUDGMENT_ADEQUATE_SAFELY_COMPLETE_DAILY_ACTIVITIES: YES
HEARING - LEFT EAR: FUNCTIONAL
JUDGMENT_ADEQUATE_SAFELY_COMPLETE_DAILY_ACTIVITIES: YES
BATHING: INDEPENDENT
WALKS IN HOME: INDEPENDENT
FEEDING YOURSELF: INDEPENDENT
HEARING - RIGHT EAR: FUNCTIONAL
DRESSING YOURSELF: INDEPENDENT
ADEQUATE_TO_COMPLETE_ADL: YES
PATIENT'S MEMORY ADEQUATE TO SAFELY COMPLETE DAILY ACTIVITIES?: YES
DRESSING YOURSELF: INDEPENDENT
TOILETING: INDEPENDENT
GROOMING: INDEPENDENT
TOILETING: INDEPENDENT
FEEDING YOURSELF: INDEPENDENT
WALKS IN HOME: INDEPENDENT

## 2023-10-05 NOTE — NURSING NOTE

## 2023-10-05 NOTE — DISCHARGE SUMMARY
Discharge Diagnosis  Acute on chronic diastolic heart failure  Acute bronchitis.  Pneumonia ruled out.  Hyperglycemia secondary to steroids.  History of diabetes.  Hypertension.  History of PE same      Issues Requiring Follow-Up  She will be following up with the cardiologist and the primary care doctor.  She will also follow-up with the warfarin clinic    Test Results Pending At Discharge  Pending Labs       Order Current Status    Basic metabolic panel In process    Magnesium In process    Blood Culture Preliminary result    Blood Culture Preliminary result            Hospital Course  Lia Jeong is a 73 y.o.  with past medical history of Asthma, Diabetes mellitus (CMS/HCC), and Pulmonary embolus (CMS/HCC) presenting with she is known to have multiple medical problems including pulmonary embolism history of DVT on long-term warfarin history of diabetes seeing endocrinology history of hypertension well-controlled dose.  Cardiologist as an outpatient history of asthma never a smoker history of COPD hyperlipidemia obstructive sleep apnea.  And diabetic neuropathy, she does take care of her grandchildren, she started having severe cough cold and she thought it was a sinus, but yesterday she got worse, she was short of breath, she came to the emergency department, in the emergency room physician had imaging done, she was diagnosed with pneumonia and congestive heart failure, as she was noted to be having leg edema, she does have some chronic leg edema, she is being admitted here for further management.  Her CAT scan of the chest was negative for pneumonia, she did have asthma exacerbation, she was given prednisone.  She was also diagnosed with acute on chronic diastolic heart failure, she was diuresed seen by the cardiologist.      Pertinent Physical Exam At Time of Discharge  Physical Exam  Constitutional:       Appearance: Normal appearance.   HENT:      Head: Normocephalic and atraumatic.      Nose: Nose normal.    Cardiovascular:      Rate and Rhythm: Normal rate and regular rhythm.      Heart sounds: Normal heart sounds.   Pulmonary:      Breath sounds: Normal breath sounds.   Abdominal:      General: Bowel sounds are normal.      Palpations: Abdomen is soft.   Musculoskeletal:         General: Swelling present.      Cervical back: Neck supple.   Neurological:      General: No focal deficit present.      Mental Status: She is alert.         Home Medications     Medication List      ASK your doctor about these medications     Advair Diskus 500-50 mcg/dose diskus inhaler; Generic drug: fluticasone   propion-salmeteroL   atorvastatin 80 mg tablet; Commonly known as: Lipitor   benzonatate 100 mg capsule; Commonly known as: Tessalon   bimatoprost 0.01 % ophthalmic solution; Commonly known as: Lumigan   brimonidine 0.2 % ophthalmic solution; Commonly known as: AlphaGAN P   cloNIDine 0.1 mg tablet; Commonly known as: Catapres   dilTIAZem  mg 24 hr tablet; Commonly known as: Cardizem LA   dorzolamide 2 % ophthalmic solution; Commonly known as: Trusopt   DULoxetine 30 mg DR capsule; Commonly known as: Cymbalta   Farxiga 5 mg; Generic drug: dapagliflozin propanediol   fluticasone 50 mcg/actuation nasal spray; Commonly known as: Flonase   gabapentin 600 mg tablet; Commonly known as: Neurontin   glipiZIDE 5 mg tablet; Commonly known as: Glucotrol   hydrALAZINE 50 mg tablet; Commonly known as: Apresoline   insulin NPH and regular human 100 unit/mL (70-30) injection; Commonly   known as: NovoLIN   ipratropium-albuteroL 0.5-2.5 mg/3 mL nebulizer solution; Commonly known   as: Duo-Neb   losartan 100 mg tablet; Commonly known as: Cozaar   metFORMIN 1,000 mg tablet; Commonly known as: Glucophage   metoprolol tartrate 50 mg tablet; Commonly known as: Lopressor   montelukast 10 mg tablet; Commonly known as: Singulair   omeprazole 20 mg DR capsule; Commonly known as: PriLOSEC   primidone 50 mg tablet; Commonly known as: Mysoline    topiramate 25 mg tablet; Commonly known as: Topamax   traMADol 50 mg tablet; Commonly known as: Ultram       Outpatient Follow-Up  Future Appointments   Date Time Provider Department [place]   10/6/2023  9:45 AM Elzbieta Salcido MD [id number] [facility]   10/20/2023  1:30 PM Elzbieta Salcido MD [id number] [facility]   10/27/2023 10:15 AM Elzbieta Salcido MD [id number] [facility]   12/18/2023 10:30 AM Johnathan Buenrostro MD [id number] [facility]       Ghazal Gray MD

## 2023-10-05 NOTE — PROGRESS NOTES
Pharmacy Medication History Review    Lia Jeong is a 73 y.o. female admitted for Pulmonary embolism (CMS/[facility]). Pharmacy reviewed the patient's fgyzj-so-xlyzeudek medications and allergies for accuracy.    The list below reflectives the updated PTA list. Please review each medication in order reconciliation for additional clarification and justification.  Medications Prior to Admission   Medication Sig Dispense Refill Last Dose    atorvastatin (Lipitor) 80 mg tablet Take 1 tablet (80 mg) by mouth once daily.       bimatoprost (Lumigan) 0.01 % ophthalmic solution Administer 1 drop into both eyes once daily at bedtime.   Past Week    brimonidine (AlphaGAN P) 0.2 % ophthalmic solution Administer 1 drop into both eyes 2 times a day.   Past Week    cloNIDine (Catapres) 0.1 mg tablet Take 1 tablet (0.1 mg) by mouth twice a day.   Past Week    dorzolamide (Trusopt) 2 % ophthalmic solution Administer 1 drop into both eyes 3 times a day.   Past Week    DULoxetine (Cymbalta) 30 mg DR capsule Take 1 capsule (30 mg) by mouth once daily.   Unknown    Farxiga 5 mg Take 1 tablet (5 mg) by mouth once daily with a meal.   Past Week    fluticasone (Flonase) 50 mcg/actuation nasal spray Administer 1 spray into each nostril once daily.   Past Week    fluticasone propion-salmeteroL (Advair Diskus) 500-50 mcg/dose diskus inhaler Inhale 1 puff 2 times a day.   Past Week    gabapentin (Neurontin) 600 mg tablet Take 2 tablets (1,200 mg) by mouth 3 times a day.   Past Week    hydrALAZINE (Apresoline) 50 mg tablet Take 1 tablet (50 mg) by mouth 3 times a day.   Past Week    insulin NPH and regular human (NovoLIN) 100 unit/mL (70-30) injection Inject 45 Units under the skin 2 times a day before meals.   Past Week    ipratropium-albuteroL (Duo-Neb) 0.5-2.5 mg/3 mL nebulizer solution Take 3 mL by nebulization 4 times a day.   Past Week    metFORMIN XR (Glucophage-XR) 500 mg 24 hr tablet Take 2 tablets (1,000 mg) by mouth 2 times a day with  meals.   Past Week    metoprolol tartrate (Lopressor) 50 mg tablet Take 1 tablet by mouth 2 times a day.       montelukast (Singulair) 10 mg tablet Take 1 tablet (10 mg) by mouth once daily at bedtime.   Past Week    omeprazole (PriLOSEC) 20 mg DR capsule Take 2 capsules (40 mg) by mouth once daily in the morning. Take before meals.   Past Week    primidone (Mysoline) 50 mg tablet Take 1 tablet (50 mg) by mouth 3 times a day.   Unknown    topiramate (Topamax) 25 mg tablet Take 2 tablets (50 mg) by mouth 2 times a day.   Past Week    traMADol (Ultram) 50 mg tablet Take 1 tablet (50 mg) by mouth every 8 hours if needed for moderate pain (4 - 6).   Unknown    dilTIAZem LA (Cardizem LA) 180 mg 24 hr tablet Take 1 tablet (180 mg) by mouth once daily.   Unknown    torsemide (Demadex) 20 mg tablet Take 1 tablet (20 mg) by mouth once daily.       warfarin (Coumadin) 5 mg tablet Take 2 tablets (10 mg) by mouth 3 (three) times a week. Monday/Thursday/Friday       warfarin (Coumadin) 5 mg tablet Take 2.5 tablets (12.5 mg) by mouth 4 times a week. Tue/Wed/Sat/Sun           The list below reflectives the updated allergy list. Please review each documented allergy for additional clarification and justification.  Allergies  Reviewed by WINNIE Dan on 10/4/2023        Severity Reactions Comments    Amoxicillin Not Specified Diarrhea     Hydrochlorothiazide Not Specified Other DECREASE SEXUAL LIBIDO Low libido    Lisinopril Not Specified Cough COUGH    Losartan Not Specified Cough             Below are additional concerns with the patient's PTA list.  Med list retrieved from medical record     Stefanie Chino PharmD

## 2023-10-06 ENCOUNTER — OFFICE VISIT (OUTPATIENT)
Dept: OPHTHALMOLOGY | Facility: CLINIC | Age: 73
End: 2023-10-06
Payer: MEDICARE

## 2023-10-06 DIAGNOSIS — H52.13 MYOPIA OF BOTH EYES: ICD-10-CM

## 2023-10-06 DIAGNOSIS — H25.811 COMBINED FORM OF AGE-RELATED CATARACT, RIGHT EYE: Primary | ICD-10-CM

## 2023-10-06 DIAGNOSIS — Z96.1 PSEUDOPHAKIA: ICD-10-CM

## 2023-10-06 DIAGNOSIS — H52.4 PRESBYOPIA: ICD-10-CM

## 2023-10-06 DIAGNOSIS — H52.203 ASTIGMATISM OF BOTH EYES, UNSPECIFIED TYPE: ICD-10-CM

## 2023-10-06 DIAGNOSIS — H40.1132 PRIMARY OPEN ANGLE GLAUCOMA OF BOTH EYES, MODERATE STAGE: ICD-10-CM

## 2023-10-06 DIAGNOSIS — H04.123 DRY EYES, BILATERAL: ICD-10-CM

## 2023-10-06 DIAGNOSIS — E11.3293 MILD NONPROLIFERATIVE DIABETIC RETINOPATHY OF BOTH EYES WITHOUT MACULAR EDEMA ASSOCIATED WITH TYPE 2 DIABETES MELLITUS (MULTI): ICD-10-CM

## 2023-10-06 PROCEDURE — 92136 OPHTHALMIC BIOMETRY: CPT | Performed by: OPHTHALMOLOGY

## 2023-10-06 PROCEDURE — 1159F MED LIST DOCD IN RCRD: CPT | Performed by: OPHTHALMOLOGY

## 2023-10-06 PROCEDURE — 99024 POSTOP FOLLOW-UP VISIT: CPT | Performed by: OPHTHALMOLOGY

## 2023-10-06 PROCEDURE — 1125F AMNT PAIN NOTED PAIN PRSNT: CPT | Performed by: OPHTHALMOLOGY

## 2023-10-06 PROCEDURE — 1036F TOBACCO NON-USER: CPT | Performed by: OPHTHALMOLOGY

## 2023-10-06 PROCEDURE — 1160F RVW MEDS BY RX/DR IN RCRD: CPT | Performed by: OPHTHALMOLOGY

## 2023-10-06 PROCEDURE — 1111F DSCHRG MED/CURRENT MED MERGE: CPT | Performed by: OPHTHALMOLOGY

## 2023-10-06 RX ORDER — CYCLOPENTOLATE HYDROCHLORIDE 10 MG/ML
1 SOLUTION/ DROPS OPHTHALMIC ONCE
Status: CANCELLED | OUTPATIENT
Start: 2023-10-06 | End: 2023-10-06

## 2023-10-06 RX ORDER — KETOROLAC TROMETHAMINE 5 MG/ML
SOLUTION OPHTHALMIC
Qty: 5 ML | Refills: 2 | Status: SHIPPED | OUTPATIENT
Start: 2023-10-18

## 2023-10-06 RX ORDER — TETRACAINE HYDROCHLORIDE 5 MG/ML
1 SOLUTION OPHTHALMIC ONCE
Status: CANCELLED | OUTPATIENT
Start: 2023-10-06 | End: 2023-10-06

## 2023-10-06 ASSESSMENT — REFRACTION_MANIFEST
OD_AXIS: 090
OD_CYLINDER: -2.00
OS_CYLINDER: -0.75
OS_AXIS: 072
OD_SPHERE: -4.25
OS_SPHERE: +0.00
OS_CYLINDER: -0.75
METHOD_AUTOREFRACTION: 1
OD_AXIS: 087
OS_SPHERE: -0.25
OD_ADD: +2.50
OS_ADD: +2.50
OD_CYLINDER: -2.00
OS_AXIS: 075
OD_SPHERE: -4.25

## 2023-10-06 ASSESSMENT — VISUAL ACUITY
OD_SC: 20/400
METHOD: SNELLEN - LINEAR
OD_BAT_MED: 20/60
OS_SC: 20/25-1

## 2023-10-06 ASSESSMENT — ENCOUNTER SYMPTOMS
EYES NEGATIVE: 0
GASTROINTESTINAL NEGATIVE: 0
PSYCHIATRIC NEGATIVE: 0
RESPIRATORY NEGATIVE: 0
ALLERGIC/IMMUNOLOGIC NEGATIVE: 0
CARDIOVASCULAR NEGATIVE: 0
HEMATOLOGIC/LYMPHATIC NEGATIVE: 0
ENDOCRINE NEGATIVE: 0
CONSTITUTIONAL NEGATIVE: 0
NEUROLOGICAL NEGATIVE: 0
MUSCULOSKELETAL NEGATIVE: 0

## 2023-10-06 ASSESSMENT — TONOMETRY
OD_IOP_MMHG: 10
OS_IOP_MMHG: 10
IOP_METHOD: GOLDMANN APPLANATION

## 2023-10-06 ASSESSMENT — SLIT LAMP EXAM - LIDS
COMMENTS: GOOD POSITION
COMMENTS: GOOD POSITION

## 2023-10-06 ASSESSMENT — EXTERNAL EXAM - LEFT EYE: OS_EXAM: NORMAL

## 2023-10-06 ASSESSMENT — EXTERNAL EXAM - RIGHT EYE: OD_EXAM: NORMAL

## 2023-10-06 ASSESSMENT — CUP TO DISC RATIO
OD_RATIO: 0.8
OS_RATIO: 0.8

## 2023-10-06 NOTE — PROGRESS NOTES
Combined form of age-related cataract, right eyeH25.811  -Visually significant cataract OD. BCVA: 20/40. Glare: 20/60(M). Symptoms: Gradual decrease in vision over the past year. Harder to read small print. More difficulty seeing small words/numbers on TV. Having more trouble seeing road signs when driving. Glare from headlights when driving at night. A change in glasses prescription will not result in significant visual improvement at this time.  Indication/anticipated outcome for cataract surgery: To potentially improve visual acuity and improve quality of life/reduce symptoms. To obtain a better view of the retina/optic nerve. To reduce anisometropia.  Based on a comprehensive eye exam performed 10/6/23, a visually significant cataract appears to be the source of decreased vision, diminished quality of life, and impairment of activities of daily living. Discussed option of cataract surgery vs observation. Patient can no longer function adequately with current best corrected visual acuity and wishes to have cataract surgery at this time. Discussed surgical procedure with patient. Discussed potential risks, benefits, and complications of cataract surgery including but not limited to pain, bleeding, infection, inflammation, edema, increased eye pressure, retinal tear/detachment, lens dislocation, ptosis, iris damage, need for additional surgery, need for glasses after surgery, loss of vision/loss of eye. Patient understands and wishes to proceed. All questions were answered. Will schedule cataract surgery OD. Lenstar done 08/28/23.   -Pentacam (8/28/23) - OD: Irregular.  42.7/43.4 @ 177.0.  OS: Irregular.  43.0/43.3 @ 146.1.  Discussed IOL options (standard monofocal, toric, multifocal). Lens chosen: Standard monofocal. Defer/decline toric/multifocal lens at this time.  Aim: [place] to -0.50. Had thorough discussion with patient re: aim. Discussed that may potentially need glasses for best vision both at distance and  at near.   Discussed distance aim OU with reading glasses for best vision at near (explained will be unable to see clearly at near without reading glasses) vs retain myopic aim OU to allow for good uncorrected near/reading vision and will need to wear glasses for distance/driving. After discussion, patient elects to have distance aim OU. Patient understands they will need to wear glasses for best vision for intermediate/near activities.  Dominance: Right  Special considerations: Will have period of anisometropia after OS CEIOL and prior to OD CEIOL. Will plan for OD CEIOL soon to follow OS to minimize time period of anisometropia. Guarded visual prognosis due to diabetic retinopathy.   Best contact number: [phone] (home - answering machine); [phone] (cell)  Drops:   -Ketorolac and Ofloxacin 4x/day starting 1 day prior to surgery; Prednisolone acetate 1% 4x/day starting after surgery  -Offered patient option of post-operative care with me vs post-operative care with Dr. Boothe. Discussed that if there are any complications or post-operative concerns will recommend patient to follow under my care. Patient chooses to have post-operative care with Dr. Boothe due to patient choice. Patient understands that he/she and Dr. Boothe may always contact me in the future with any questions or concerns.   **Referred by Dr. Boothe    Pseudophakia of left eyeZ96.1 - 9/28/23  -Doing well. Good vision. IOP good.  Prednisolone acetate 1% - 4/3/2/1 weekly taper  Ofloxacin - 4 times a day x 3 more days, then stop  Ketorolac - Patient never received from pharmacy, will defer for now  No heavy lifting over 15-20 lbs, do not get eye wet, no eye rubbing. Discontinue eye shield at bedtime but wear sunglasses/glasses during the day. Advised to call if any redness, pain, decreased vision, flashes, floaters.     Chronic open angle glaucoma of both eyes, moderate yiodbB30.1132  -OCT RNFL (7/13/2022) - OD: Thin S/T, borderline I.   OS: Thin S, borderline N/I.  64 OU.  -Continue brimonidine OU BID, dorzolamide OU BID, and lumigan OU QHS  -Monitored by Dr. Boothe.     Mild nonproliferative diabetic retinopathy of both eyes without macular hjxrkI22.1303  -OCT macula (8/28/23) - SS: 7/10 OU.  Poor image capture OU.  Possible mild thinning, flattening of foveal contour OU.  Intact IS-OS.  No significant central edema OU.  145/187.  -Guarded visual prognosis with cataract surgery secondary to diabetic retinopathy.  Will need OCT macula at 1 month postop visit, plan to refer to back to retina service as needed.    Dry eye syndrome, kodtucgydS14.123  -Continue artificial tears PRN. Managed by Dr. Boothe.     Bilateral jhlubnY87.13  Regular astigmatism, gfbtbzxvjI94.223  SlcwnphgzyD56.4  -Defer new Rx. No significant improvement in vision with refraction at this time.         No history of refractive surgery.   No FH of AMD

## 2023-10-06 NOTE — PATIENT INSTRUCTIONS
Surgeon: Elzbieta Salcido MD       [phone]      Patient name: Lia Jeong    Date of visit: October 6, 2023      left eye    Prednisolone acetate (pink or white cap) - 4 times a day for 1 week, 3 times a day for 1 week, 2 times a day for 1 week, once a day for 1 week, then stop    Ofloxacin (tan cap) - 4 times a day for 3 more days, then stop      No heavy lifting over 15-20 lbs, try not to get eye wet, no eye rubbing. You may stop wearing the eye shield at night. Please continue wearing glasses or sunglasses during the day to protect your eyes. Please call immediately if you develop any redness, pain, decreased vision, flashes, floaters.     Surgical Scheduler (during office hours) - Radhika: [phone]

## 2023-10-08 ENCOUNTER — PREP FOR PROCEDURE (OUTPATIENT)
Dept: OPHTHALMOLOGY | Facility: CLINIC | Age: 73
End: 2023-10-08
Payer: MEDICARE

## 2023-10-11 ENCOUNTER — LAB (OUTPATIENT)
Dept: LAB | Facility: LAB | Age: 73
End: 2023-10-11
Payer: MEDICARE

## 2023-10-11 DIAGNOSIS — I50.9 HEART FAILURE, UNSPECIFIED (MULTI): ICD-10-CM

## 2023-10-11 DIAGNOSIS — E11.9 TYPE 2 DIABETES MELLITUS WITHOUT COMPLICATIONS (MULTI): Primary | ICD-10-CM

## 2023-10-11 PROCEDURE — 85025 COMPLETE CBC W/AUTO DIFF WBC: CPT

## 2023-10-11 PROCEDURE — 83036 HEMOGLOBIN GLYCOSYLATED A1C: CPT

## 2023-10-11 PROCEDURE — 36415 COLL VENOUS BLD VENIPUNCTURE: CPT

## 2023-10-11 PROCEDURE — 80053 COMPREHEN METABOLIC PANEL: CPT

## 2023-10-11 PROCEDURE — 82248 BILIRUBIN DIRECT: CPT

## 2023-10-12 LAB
ALBUMIN SERPL BCP-MCNC: 3.9 G/DL (ref 3.4–5)
ALP SERPL-CCNC: 105 U/L (ref 33–136)
ALT SERPL W P-5'-P-CCNC: 30 U/L (ref 7–45)
ANION GAP SERPL CALC-SCNC: 11 MMOL/L (ref 10–20)
AST SERPL W P-5'-P-CCNC: 23 U/L (ref 9–39)
BASOPHILS # BLD AUTO: 0.05 X10*3/UL (ref 0–0.1)
BASOPHILS NFR BLD AUTO: 0.4 %
BILIRUB DIRECT SERPL-MCNC: 0.1 MG/DL (ref 0–0.3)
BILIRUB SERPL-MCNC: 0.3 MG/DL (ref 0–1.2)
BUN SERPL-MCNC: 34 MG/DL (ref 6–23)
CALCIUM SERPL-MCNC: 10 MG/DL (ref 8.6–10.6)
CHLORIDE SERPL-SCNC: 104 MMOL/L (ref 98–107)
CO2 SERPL-SCNC: 29 MMOL/L (ref 21–32)
CREAT SERPL-MCNC: 0.98 MG/DL (ref 0.5–1.05)
EOSINOPHIL # BLD AUTO: 0.07 X10*3/UL (ref 0–0.4)
EOSINOPHIL NFR BLD AUTO: 0.6 %
ERYTHROCYTE [DISTWIDTH] IN BLOOD BY AUTOMATED COUNT: 15.2 % (ref 11.5–14.5)
EST. AVERAGE GLUCOSE BLD GHB EST-MCNC: 206 MG/DL
GFR SERPL CREATININE-BSD FRML MDRD: 61 ML/MIN/1.73M*2
GLUCOSE SERPL-MCNC: 38 MG/DL (ref 74–99)
HBA1C MFR BLD: 8.8 %
HCT VFR BLD AUTO: 43.4 % (ref 36–46)
HGB BLD-MCNC: 13.3 G/DL (ref 12–16)
IMM GRANULOCYTES # BLD AUTO: 0.14 X10*3/UL (ref 0–0.5)
IMM GRANULOCYTES NFR BLD AUTO: 1.2 % (ref 0–0.9)
LYMPHOCYTES # BLD AUTO: 2.43 X10*3/UL (ref 0.8–3)
LYMPHOCYTES NFR BLD AUTO: 20.3 %
MCH RBC QN AUTO: 26.8 PG (ref 26–34)
MCHC RBC AUTO-ENTMCNC: 30.6 G/DL (ref 32–36)
MCV RBC AUTO: 88 FL (ref 80–100)
MONOCYTES # BLD AUTO: 0.94 X10*3/UL (ref 0.05–0.8)
MONOCYTES NFR BLD AUTO: 7.9 %
NEUTROPHILS # BLD AUTO: 8.33 X10*3/UL (ref 1.6–5.5)
NEUTROPHILS NFR BLD AUTO: 69.6 %
NRBC BLD-RTO: 0 /100 WBCS (ref 0–0)
PLATELET # BLD AUTO: 394 X10*3/UL (ref 150–450)
PMV BLD AUTO: 9.9 FL (ref 7.5–11.5)
POTASSIUM SERPL-SCNC: 5.4 MMOL/L (ref 3.5–5.3)
PROT SERPL-MCNC: 7.1 G/DL (ref 6.4–8.2)
RBC # BLD AUTO: 4.96 X10*6/UL (ref 4–5.2)
SODIUM SERPL-SCNC: 139 MMOL/L (ref 136–145)
WBC # BLD AUTO: 12 X10*3/UL (ref 4.4–11.3)

## 2023-10-15 ASSESSMENT — SLIT LAMP EXAM - LIDS
COMMENTS: GOOD POSITION
COMMENTS: GOOD POSITION

## 2023-10-15 ASSESSMENT — EXTERNAL EXAM - LEFT EYE: OS_EXAM: NORMAL

## 2023-10-15 ASSESSMENT — EXTERNAL EXAM - RIGHT EYE: OD_EXAM: NORMAL

## 2023-10-16 RX ORDER — WARFARIN 7.5 MG/1
7.5 TABLET ORAL
COMMUNITY

## 2023-10-16 RX ORDER — WARFARIN 10 MG/1
10 TABLET ORAL
COMMUNITY

## 2023-10-16 NOTE — PROGRESS NOTES
Pseudophakia of right eyeZ96.1 - 10/19/23  -Doing well. Good vision. IOP elevated - IOP 12 after anterior chamber (AC) para.   Prednisolone acetate 1% - 4 times a day until seen by Dr. Boothe, then taper per her instructions.   Ofloxacin - 4 times a day  Ketorolac - 4 times a day  Advised to use extra drop of brimonidine and dorzolamide as soon as she gets home today, and again in the evening.   No heavy lifting over 10-15 lbs, no bending over, do not get eye wet, no eye rubbing. Wear eye shield at bedtime and sunglasses/glasses during the day. Advised to call if any redness, pain, decreased vision, flashes, floaters. F/u 1 week with Dr. Boothe as scheduled - Refract both eyes (autorefract first).   **Referred by Dr. Boothe    Pseudophakia of left eyeZ96.1 - 9/28/23  -Doing well. Good vision. IOP good.   Prednisolone acetate 1% - BID until next visit.     Chronic open angle glaucoma of both eyes, moderate kfjqvK77.1132  -OCT RNFL (7/13/2022) - OD: Thin S/T, borderline I.  OS: Thin S, borderline N/I.  64 OU.  -Continue brimonidine OU BID, dorzolamide OU BID, and lumigan OU QHS  -Monitored by Dr. Boothe.     Mild nonproliferative diabetic retinopathy of both eyes without macular vcgysF96.3293  -OCT macula (8/28/23) - SS: 7/10 OU.  Poor image capture OU.  Possible mild thinning, flattening of foveal contour OU.  Intact IS-OS.  No significant central edema OU.  145/187.  -Guarded visual prognosis with cataract surgery secondary to diabetic retinopathy.  Will need OCT macula at 1 month postop visit, plan to refer to back to retina service as needed.    Dry eye syndrome, tcjglfxexA05.123  -Continue artificial tears PRN. Managed by Dr. Boothe.     Bilateral xqlzhdM09.13  Regular astigmatism, kyuuzbjdmF05.223  HktjetyoqbO78.4  -F/u 1 week with Dr. Boothe as scheduled - Refract both eyes (autorefract first).       No history of refractive surgery.   No FH of AMD

## 2023-10-18 ENCOUNTER — ANESTHESIA EVENT (OUTPATIENT)
Dept: OPERATING ROOM | Facility: CLINIC | Age: 73
End: 2023-10-18
Payer: MEDICARE

## 2023-10-19 ENCOUNTER — HOSPITAL ENCOUNTER (OUTPATIENT)
Facility: CLINIC | Age: 73
Setting detail: OUTPATIENT SURGERY
Discharge: HOME | End: 2023-10-19
Attending: OPHTHALMOLOGY | Admitting: OPHTHALMOLOGY
Payer: MEDICARE

## 2023-10-19 ENCOUNTER — ANESTHESIA (OUTPATIENT)
Dept: OPERATING ROOM | Facility: CLINIC | Age: 73
End: 2023-10-19
Payer: MEDICARE

## 2023-10-19 VITALS
DIASTOLIC BLOOD PRESSURE: 57 MMHG | TEMPERATURE: 97.5 F | HEART RATE: 55 BPM | BODY MASS INDEX: 35.22 KG/M2 | SYSTOLIC BLOOD PRESSURE: 123 MMHG | RESPIRATION RATE: 16 BRPM | WEIGHT: 211.64 LBS | OXYGEN SATURATION: 96 %

## 2023-10-19 DIAGNOSIS — H25.811 COMBINED FORM OF AGE-RELATED CATARACT, RIGHT EYE: Primary | ICD-10-CM

## 2023-10-19 PROBLEM — J44.9 CHRONIC OBSTRUCTIVE PULMONARY DISEASE (MULTI): Status: ACTIVE | Noted: 2023-10-19

## 2023-10-19 LAB — GLUCOSE BLD MANUAL STRIP-MCNC: 131 MG/DL (ref 74–99)

## 2023-10-19 PROCEDURE — 7100000009 HC PHASE TWO TIME - INITIAL BASE CHARGE: Performed by: OPHTHALMOLOGY

## 2023-10-19 PROCEDURE — 2500000004 HC RX 250 GENERAL PHARMACY W/ HCPCS (ALT 636 FOR OP/ED)

## 2023-10-19 PROCEDURE — 7100000010 HC PHASE TWO TIME - EACH INCREMENTAL 1 MINUTE: Performed by: OPHTHALMOLOGY

## 2023-10-19 PROCEDURE — 3700000002 HC GENERAL ANESTHESIA TIME - EACH INCREMENTAL 1 MINUTE: Performed by: OPHTHALMOLOGY

## 2023-10-19 PROCEDURE — 2500000001 HC RX 250 WO HCPCS SELF ADMINISTERED DRUGS (ALT 637 FOR MEDICARE OP): Performed by: OPHTHALMOLOGY

## 2023-10-19 PROCEDURE — 82947 ASSAY GLUCOSE BLOOD QUANT: CPT | Mod: CMCLAB

## 2023-10-19 PROCEDURE — 2500000001 HC RX 250 WO HCPCS SELF ADMINISTERED DRUGS (ALT 637 FOR MEDICARE OP): Performed by: STUDENT IN AN ORGANIZED HEALTH CARE EDUCATION/TRAINING PROGRAM

## 2023-10-19 PROCEDURE — 3600000003 HC OR TIME - INITIAL BASE CHARGE - PROCEDURE LEVEL THREE: Performed by: OPHTHALMOLOGY

## 2023-10-19 PROCEDURE — 2780000003 HC OR 278 NO HCPCS: Performed by: OPHTHALMOLOGY

## 2023-10-19 PROCEDURE — A66984 PR REMV CATARACT EXTRACAP,INSERT LENS

## 2023-10-19 PROCEDURE — 3700000001 HC GENERAL ANESTHESIA TIME - INITIAL BASE CHARGE: Performed by: OPHTHALMOLOGY

## 2023-10-19 PROCEDURE — 99100 ANES PT EXTEME AGE<1 YR&>70: CPT | Performed by: ANESTHESIOLOGY

## 2023-10-19 PROCEDURE — 66984 XCAPSL CTRC RMVL W/O ECP: CPT | Performed by: OPHTHALMOLOGY

## 2023-10-19 PROCEDURE — 2500000005 HC RX 250 GENERAL PHARMACY W/O HCPCS: Performed by: OPHTHALMOLOGY

## 2023-10-19 PROCEDURE — 2500000004 HC RX 250 GENERAL PHARMACY W/ HCPCS (ALT 636 FOR OP/ED): Performed by: OPHTHALMOLOGY

## 2023-10-19 PROCEDURE — 3600000008 HC OR TIME - EACH INCREMENTAL 1 MINUTE - PROCEDURE LEVEL THREE: Performed by: OPHTHALMOLOGY

## 2023-10-19 PROCEDURE — A66984 PR REMV CATARACT EXTRACAP,INSERT LENS: Performed by: ANESTHESIOLOGY

## 2023-10-19 PROCEDURE — A4217 STERILE WATER/SALINE, 500 ML: HCPCS | Performed by: OPHTHALMOLOGY

## 2023-10-19 DEVICE — STERILE UV AND BLUE LIGHT FILTERING ACRYLIC FOLDABLE SINGLE-PIECE POSTERIOR CHAMBER LENSES WITH THE ULTRASERT® PRE-LOADED DELIVERY SYSTEM
Type: IMPLANTABLE DEVICE | Site: EYE | Status: FUNCTIONAL
Brand: ACRYSOF® ULTRASERT®

## 2023-10-19 RX ORDER — POVIDONE-IODINE 5 %
SOLUTION, NON-ORAL OPHTHALMIC (EYE) AS NEEDED
Status: DISCONTINUED | OUTPATIENT
Start: 2023-10-19 | End: 2023-10-19 | Stop reason: HOSPADM

## 2023-10-19 RX ORDER — TETRACAINE HYDROCHLORIDE 5 MG/ML
SOLUTION OPHTHALMIC AS NEEDED
Status: DISCONTINUED | OUTPATIENT
Start: 2023-10-19 | End: 2023-10-19 | Stop reason: HOSPADM

## 2023-10-19 RX ORDER — ACETAMINOPHEN 325 MG/1
650 TABLET ORAL EVERY 4 HOURS PRN
Status: DISCONTINUED | OUTPATIENT
Start: 2023-10-19 | End: 2023-10-19 | Stop reason: HOSPADM

## 2023-10-19 RX ORDER — CYCLOPENTOLATE HYDROCHLORIDE 10 MG/ML
1 SOLUTION/ DROPS OPHTHALMIC
Status: SHIPPED | OUTPATIENT
Start: 2023-10-19 | End: 2023-10-19

## 2023-10-19 RX ORDER — LIDOCAINE HYDROCHLORIDE 10 MG/ML
0.1 INJECTION, SOLUTION EPIDURAL; INFILTRATION; INTRACAUDAL; PERINEURAL ONCE
Status: DISCONTINUED | OUTPATIENT
Start: 2023-10-19 | End: 2023-10-19 | Stop reason: HOSPADM

## 2023-10-19 RX ORDER — TETRACAINE HYDROCHLORIDE 5 MG/ML
1 SOLUTION OPHTHALMIC ONCE
Status: COMPLETED | OUTPATIENT
Start: 2023-10-19 | End: 2023-10-19

## 2023-10-19 RX ORDER — TETRACAINE HYDROCHLORIDE 5 MG/ML
1 SOLUTION OPHTHALMIC ONCE
Status: DISCONTINUED | OUTPATIENT
Start: 2023-10-19 | End: 2023-10-19 | Stop reason: HOSPADM

## 2023-10-19 RX ORDER — FENTANYL CITRATE 50 UG/ML
50 INJECTION, SOLUTION INTRAMUSCULAR; INTRAVENOUS EVERY 5 MIN PRN
Status: DISCONTINUED | OUTPATIENT
Start: 2023-10-19 | End: 2023-10-19 | Stop reason: HOSPADM

## 2023-10-19 RX ORDER — LIDOCAINE HYDROCHLORIDE 10 MG/ML
INJECTION, SOLUTION EPIDURAL; INFILTRATION; INTRACAUDAL; PERINEURAL AS NEEDED
Status: DISCONTINUED | OUTPATIENT
Start: 2023-10-19 | End: 2023-10-19 | Stop reason: HOSPADM

## 2023-10-19 RX ORDER — PHENYLEPHRINE HYDROCHLORIDE 25 MG/ML
1 SOLUTION/ DROPS OPHTHALMIC
Status: COMPLETED | OUTPATIENT
Start: 2023-10-19 | End: 2023-10-19

## 2023-10-19 RX ORDER — CYCLOPENTOLATE HYDROCHLORIDE 10 MG/ML
1 SOLUTION/ DROPS OPHTHALMIC
Status: COMPLETED | OUTPATIENT
Start: 2023-10-19 | End: 2023-10-19

## 2023-10-19 RX ORDER — PHENYLEPHRINE HYDROCHLORIDE 25 MG/ML
1 SOLUTION/ DROPS OPHTHALMIC
Status: SHIPPED | OUTPATIENT
Start: 2023-10-19 | End: 2023-10-19

## 2023-10-19 RX ORDER — OXYCODONE HYDROCHLORIDE 5 MG/1
5 TABLET ORAL EVERY 4 HOURS PRN
Status: DISCONTINUED | OUTPATIENT
Start: 2023-10-19 | End: 2023-10-19 | Stop reason: HOSPADM

## 2023-10-19 RX ORDER — WATER 1 ML/ML
IRRIGANT IRRIGATION AS NEEDED
Status: DISCONTINUED | OUTPATIENT
Start: 2023-10-19 | End: 2023-10-19 | Stop reason: HOSPADM

## 2023-10-19 RX ORDER — TROPICAMIDE 10 MG/ML
1 SOLUTION/ DROPS OPHTHALMIC
Status: SHIPPED | OUTPATIENT
Start: 2023-10-19 | End: 2023-10-19

## 2023-10-19 RX ORDER — SODIUM CHLORIDE, SODIUM LACTATE, POTASSIUM CHLORIDE, CALCIUM CHLORIDE 600; 310; 30; 20 MG/100ML; MG/100ML; MG/100ML; MG/100ML
100 INJECTION, SOLUTION INTRAVENOUS CONTINUOUS
Status: DISCONTINUED | OUTPATIENT
Start: 2023-10-19 | End: 2023-10-19 | Stop reason: HOSPADM

## 2023-10-19 RX ORDER — ONDANSETRON HYDROCHLORIDE 2 MG/ML
4 INJECTION, SOLUTION INTRAVENOUS ONCE AS NEEDED
Status: DISCONTINUED | OUTPATIENT
Start: 2023-10-19 | End: 2023-10-19 | Stop reason: HOSPADM

## 2023-10-19 RX ORDER — MIDAZOLAM HYDROCHLORIDE 1 MG/ML
INJECTION, SOLUTION INTRAMUSCULAR; INTRAVENOUS AS NEEDED
Status: DISCONTINUED | OUTPATIENT
Start: 2023-10-19 | End: 2023-10-19

## 2023-10-19 RX ORDER — SODIUM CHLORIDE 0.9 % (FLUSH) 0.9 %
SYRINGE (ML) INJECTION AS NEEDED
Status: DISCONTINUED | OUTPATIENT
Start: 2023-10-19 | End: 2023-10-19

## 2023-10-19 RX ADMIN — CYCLOPENTOLATE HYDROCHLORIDE 1 DROP: 10 SOLUTION/ DROPS OPHTHALMIC at 08:10

## 2023-10-19 RX ADMIN — Medication 3 ML: at 08:45

## 2023-10-19 RX ADMIN — PHENYLEPHRINE HYDROCHLORIDE 1 DROP: 25 SOLUTION/ DROPS OPHTHALMIC at 08:05

## 2023-10-19 RX ADMIN — PHENYLEPHRINE HYDROCHLORIDE 1 DROP: 25 SOLUTION/ DROPS OPHTHALMIC at 08:00

## 2023-10-19 RX ADMIN — CYCLOPENTOLATE HYDROCHLORIDE 1 DROP: 10 SOLUTION/ DROPS OPHTHALMIC at 08:05

## 2023-10-19 RX ADMIN — PHENYLEPHRINE HYDROCHLORIDE 1 DROP: 25 SOLUTION/ DROPS OPHTHALMIC at 08:10

## 2023-10-19 RX ADMIN — TETRACAINE HYDROCHLORIDE 1 DROP: 5 SOLUTION/ DROPS OPHTHALMIC at 08:00

## 2023-10-19 RX ADMIN — MIDAZOLAM 2 MG: 1 INJECTION INTRAMUSCULAR; INTRAVENOUS at 08:44

## 2023-10-19 RX ADMIN — CYCLOPENTOLATE HYDROCHLORIDE 1 DROP: 10 SOLUTION/ DROPS OPHTHALMIC at 08:00

## 2023-10-19 ASSESSMENT — COLUMBIA-SUICIDE SEVERITY RATING SCALE - C-SSRS
1. IN THE PAST MONTH, HAVE YOU WISHED YOU WERE DEAD OR WISHED YOU COULD GO TO SLEEP AND NOT WAKE UP?: NO
6. HAVE YOU EVER DONE ANYTHING, STARTED TO DO ANYTHING, OR PREPARED TO DO ANYTHING TO END YOUR LIFE?: NO
2. HAVE YOU ACTUALLY HAD ANY THOUGHTS OF KILLING YOURSELF?: NO

## 2023-10-19 ASSESSMENT — PAIN SCALES - GENERAL
PAINLEVEL_OUTOF10: 0 - NO PAIN

## 2023-10-19 ASSESSMENT — PAIN - FUNCTIONAL ASSESSMENT
PAIN_FUNCTIONAL_ASSESSMENT: 0-10
PAIN_FUNCTIONAL_ASSESSMENT: 0-10

## 2023-10-19 NOTE — DISCHARGE INSTRUCTIONS
Surgeon: Elzbieta Salcido MD     Patient name: Lia Jeong    Date of surgery: October 19, 2023        DROP INSTRUCTIONS:    Prednisolone acetate 1% (pink or white cap) - One drop 4 times a day to operative eye    Ofloxacin (tan cap) - One drop 4 times a day to operative eye    Ketorolac (gray cap) - One drop 4 times a day to operative eye    When putting different drops in around the same administration time, please wait 1-2 minutes between drops  Avoid sleeping on side of operative eye  No heavy lifting over 10-15lbs and no bending over  Do not get eye wet, no eye rubbing  Wear sunglasses or glasses during the day and the shield when sleeping at night  Please call immediately if you develop any redness, pain, decreased vision, flashes, or floaters      During office hours (8:30a-4:30p): [phone]  After office hours: [phone]  [facility] : [phone]   Pager: [phone] for Dr. Carson

## 2023-10-19 NOTE — H&P
History Of Present Illness  Lia Jeong is a 73 y.o. female presenting with visually significant cataract of the right eye .     Past Medical History  Past Medical History:   Diagnosis Date    Asthma     Cataract     CHF (congestive heart failure) (CMS/HCC)     Clotting disorder (CMS/HCC)     Diabetes mellitus (CMS/HCC)     GERD (gastroesophageal reflux disease)     Hyperlipidemia     Hypertension     Peripheral neuropathy     Pulmonary embolus (CMS/HCC)     Sleep apnea        Surgical History  Past Surgical History:   Procedure Laterality Date    CATARACT EXTRACTION      EYE SURGERY  03/19/2015    Eye Surgery    MR HEAD ANGIO WO IV CONTRAST  05/18/2022    MR HEAD ANGIO WO IV CONTRAST 5/18/2022 [facility]    MR NECK ANGIO WO IV CONTRAST  05/18/2022    MR NECK ANGIO WO IV CONTRAST 5/18/2022 [facility]    TONSILLECTOMY  09/28/2017    Tonsillectomy    TUBAL LIGATION  09/28/2017    Tubal Ligation        Social History  She reports that she has never smoked. She has never been exposed to tobacco smoke. She has never used smokeless tobacco. She reports that she does not drink alcohol and does not use drugs.    Family History  Family History   Problem Relation Name Age of Onset    Hypertension Mother      Hyperlipidemia Mother      Diabetes Mother          Allergies  Amoxicillin, Amoxicillin-pot clavulanate, Hydrochlorothiazide, Lisinopril, and Losartan    Review of Systems   All other systems reviewed and are negative.       Physical Exam  Vitals reviewed.   Constitutional:       Appearance: Normal appearance.   HENT:      Head: Normocephalic and atraumatic.      Mouth/Throat:      Pharynx: Oropharynx is clear.   Eyes:      Conjunctiva/sclera: Conjunctivae normal.   Abdominal:      General: Abdomen is flat.   Musculoskeletal:         General: Normal range of motion.      Cervical back: Normal range of motion.   Skin:     General: Skin is warm.   Neurological:      General: No focal deficit present.      Mental  Status: She is alert and oriented to person, place, and time.   Psychiatric:         Mood and Affect: Mood normal.         Behavior: Behavior normal.          Last Recorded Vitals  Blood pressure 119/60, pulse 59, temperature 36.4 °C (97.5 °F), temperature source Temporal, resp. rate 16, weight 96 kg (211 lb 10.3 oz), SpO2 98 %.    Relevant Results           Assessment/Plan   Principal Problem:    Combined form of age-related cataract, right eye  Active Problems:    Chronic obstructive pulmonary disease (CMS/HCC)      #Visually significant cataract of the right eye   -Plan for cataract extraction and intraocular lens (IOL) implantation of right eye             Renzo Cross MD

## 2023-10-19 NOTE — OP NOTE
Phacoemulsification Cataract with Insertion Intraocular Lens (R) Operative Note     Date: 10/19/2023  OR Location: [facility] [place] [facility] OR    Name: Lia Jeong, : 1950, Age: 73 y.o., MRN: [id number], Sex: female    Diagnosis  Pre-op Diagnosis     * Combined form of age-related cataract, right eye [H25.811] Post-op Diagnosis     * Combined form of age-related cataract, right eye [H25.811]     Procedures  Phacoemulsification Cataract with Insertion Intraocular Lens  28056 - MA XCAPSL CTRC RMVL INSJ IO LENS PROSTH W/O ECP      Surgeons      * Elzbieta Salcido - Primary    Resident/Fellow/Other Assistant:  Surgeon(s) and Role:    Procedure Summary  Anesthesia: Monitor Anesthesia Care  ASA: III  Anesthesia Staff: Anesthesiologist: Amanuel Hendrickson MD  C-AA: CINDY Jefferson  Estimated Blood Loss: 0 mL  Intra-op Medications:   Medication Name Total Dose   lidocaine PF (Xylocaine) 10 mg/mL (1 %) injection 3 mL   balanced salts (BSS) intraocular solution 15 mL   sterile water irrigation solution 50 mL   povidone-iodine 5 % ophthalmic solution 1 Application   tetracaine (PF) 0.5 % ophthalmic solution 2 drop   EPINEPHrine (Adrenalin) 0.3 mg in sodium chloride 0.9 % 500 mL OR irrigation 500 mL              Anesthesia Record               Intraprocedure I/O Totals       None           Specimen: No specimens collected     Staff:   Circulator: Sara Pedro RN; Geronimo Elder RN  Scrub Person: Jeaneth Hickman         Drains and/or Catheters: * None in log *    Tourniquet Times:         Implants:  Implants       Type Name Action Serial No.       12.0 DIOPTER, ACRYSOF IQ UV AND BLUE LIGHT FILTERING ACRYLIC FOLDABLE SINGLE-PIECE POSTERIOR CHAMBER LENSES W/ THE ULTRASERT PRE-LOADED DELIVERY SYSTEM, MODEL  Implanted [id number]              Findings: as above    Indications: Lia Jeong is an 73 y.o. female who is having surgery for Combined form of age-related cataract, right eye [H25.811].     Patient name: Lia Jeong    YOB: 1950   MRN: [id number]   Date of surgery: October 19, 2023  Preoperative diagnosis: Combined cataract of the RIGHT eye  Postoperative diagnosis: Combined cataract of the RIGHT eye  Procedure: Phacoemulsification of cataract with insertion of intraocular lens, RIGHT eye   Surgeon: Elzbieta Salcido MD  Resident:  Anesthesia: MAC  Complications: None  Lens Inserted: Terry ACU0T0 with a power of +12.0 D. Serial #: [id number]. Exp date: 07/17/2024.    Procedure description: After the risks, benefits, and alternatives of the planned procedure were discussed with the patient, informed consent was obtained at the preoperative evaluation. On the day of surgery, there were no updates to the consent form and any patient questions were answered. The patient was correctly identified in the preoperative area and the RIGHT eye was marked as the operative eye. Dilating drops were instilled into the RIGHT eye in the preoperative area. The patient was then taken back to the operating room and placed under sedation. The patient was prepped and draped in the standard, sterile ophthalmic fashion in preparation for intraocular surgery.    A lid speculum was placed into the RIGHT palpebral fissure and the operating microscope was brought into position. A paracentesis was made in superotemporal cornea at the limbus with a 1.0mm side port blade using a cotton tipped applicator to provide countertraction. Intracameral lidocaine was injected into the anterior chamber followed by Viscoat viscoelastic. Using 0.12 forceps to stabilize the globe, a 2.4mm keratome blade was used to create a limbal clear-corneal incision inferotemporally. A bent-needle cystotome and Utrata forceps were used to create a continuous curvilinear capsulorrhexis. Balanced salt saline solution on a blunt-tipped cannula was used to achieve hydrodissection.    A phacoemulsification device and a Reggie spatula were used to remove the nucleus using a  divide-and-conquer technique. Residual cortical material was removed with the irrigation and aspiration handpiece. Provisc viscoelastic was then injected into the eye to reform the anterior chamber and to open the capsular bag. The posterior capsule was inspected and found to be clean and intact. The intraocular lens, Terry ACU0T0 with a power of +12.0 diopters was injected into the capsular bag. A lens positioner was used to center the lens and ensure good position within the bag. The remaining viscoelastic was removed using irrigation and aspiration. Balanced salt saline solution on a blunt-tipped cannula was then used to hydrate the corneal stroma adjacent to the main wound and paracentesis site as well as to reform the anterior chamber. The wound was checked and found to be watertight with normal intraocular pressure verified using digital palpation. At the conclusion of the case, a well-centered intraocular lens with a good red reflex was observed. Tetracaine, betadine, BSS, and prednisolone acetate drops were instilled into the RIGHT eye. The lid speculum and drapes were removed. A clear plastic shield was then taped over the eye. The patient was taken to the recovery room in stable condition, having tolerated the procedure well. There were no complications.        Attending Attestation: I was present and scrubbed for the entire procedure.    Elzbieta Salcido  Phone Number: [phone]

## 2023-10-19 NOTE — ANESTHESIA PREPROCEDURE EVALUATION
Patient: Lia Jeong    Procedure Information       Date/Time: 10/19/23 0815    Procedure: Phacoemulsification Cataract with Insertion Intraocular Lens (Right: Eye)    [place] [facility] [place] [facility]    Surgeons: Elzbieta Salcido MD            Relevant Problems   Cardiovascular   (+) (HFpEF) heart failure with preserved ejection fraction (CMS/HCC)   (+) Chest pain   (+) Essential hypertension   (+) Mixed hyperlipidemia   (+) Pulmonary embolism (CMS/HCC)      Endocrine   (+) Diabetic peripheral neuropathy (CMS/[facility])   (+) Mild nonproliferative diabetic retinopathy of both eyes without macular edema associated with type 2 diabetes mellitus (CMS/HCC)   (+) Type 2 diabetes with stage 2 chronic kidney disease GFR 60-89 (CMS/[facility])      Neuro/Psych   (+) Carpal tunnel syndrome of right wrist   (+) Diabetic peripheral neuropathy (CMS/HCC)   (+) Small fiber neuropathy      Pulmonary   (+) Asthma   (+) Chronic obstructive pulmonary disease (CMS/[facility])   (+) Mild persistent asthma without complication   (+) Obstructive sleep apnea   (+) Pulmonary embolism (CMS/HCC)   (+) SOB (shortness of breath) on exertion      Hematology   (+) Anticardiolipin antibody syndrome (CMS/HCC)      Musculoskeletal   (+) Carpal tunnel syndrome of right wrist      Eyes, Ears, Nose, and Throat   (+) Asymmetric SNHL (sensorineural hearing loss)   (+) Primary open angle glaucoma of both eyes, moderate stage       Clinical information reviewed:   Tobacco  Allergies  Meds   Med Hx  Surg Hx   Fam Hx  Soc Hx        NPO Detail:  NPO/Void Status  Date of Last Liquid: 10/18/23  Time of Last Liquid: 2359  Date of Last Solid: 10/18/23  Time of Last Solid: 1930         Physical Exam    Airway  Mallampati: II     Cardiovascular    Dental    Pulmonary    Abdominal            Anesthesia Plan    ASA 3     general     intravenous induction   Anesthetic plan and risks discussed with patient.

## 2023-10-20 ENCOUNTER — OFFICE VISIT (OUTPATIENT)
Dept: OPHTHALMOLOGY | Facility: CLINIC | Age: 73
End: 2023-10-20
Payer: MEDICARE

## 2023-10-20 DIAGNOSIS — Z96.1 PSEUDOPHAKIA: Primary | ICD-10-CM

## 2023-10-20 DIAGNOSIS — H52.4 PRESBYOPIA: ICD-10-CM

## 2023-10-20 DIAGNOSIS — H52.13 MYOPIA OF BOTH EYES: ICD-10-CM

## 2023-10-20 DIAGNOSIS — H04.123 DRY EYES, BILATERAL: ICD-10-CM

## 2023-10-20 DIAGNOSIS — H40.1132 PRIMARY OPEN ANGLE GLAUCOMA OF BOTH EYES, MODERATE STAGE: ICD-10-CM

## 2023-10-20 DIAGNOSIS — H52.203 ASTIGMATISM OF BOTH EYES, UNSPECIFIED TYPE: ICD-10-CM

## 2023-10-20 DIAGNOSIS — E11.3293 MILD NONPROLIFERATIVE DIABETIC RETINOPATHY OF BOTH EYES WITHOUT MACULAR EDEMA ASSOCIATED WITH TYPE 2 DIABETES MELLITUS (MULTI): ICD-10-CM

## 2023-10-20 PROCEDURE — 99024 POSTOP FOLLOW-UP VISIT: CPT | Performed by: OPHTHALMOLOGY

## 2023-10-20 ASSESSMENT — TONOMETRY
OD_IOP_MMHG: 26
IOP_METHOD: GOLDMANN APPLANATION
OD_IOP_MMHG: 12
IOP_METHOD: GOLDMANN APPLANATION

## 2023-10-20 ASSESSMENT — ENCOUNTER SYMPTOMS
CARDIOVASCULAR NEGATIVE: 0
ALLERGIC/IMMUNOLOGIC NEGATIVE: 0
MUSCULOSKELETAL NEGATIVE: 0
EYES NEGATIVE: 0
NEUROLOGICAL NEGATIVE: 0
RESPIRATORY NEGATIVE: 0
PSYCHIATRIC NEGATIVE: 0
GASTROINTESTINAL NEGATIVE: 0
CONSTITUTIONAL NEGATIVE: 0
HEMATOLOGIC/LYMPHATIC NEGATIVE: 0
ENDOCRINE NEGATIVE: 0

## 2023-10-20 ASSESSMENT — VISUAL ACUITY
METHOD: SNELLEN - LINEAR
OD_SC: 20/40

## 2023-10-20 ASSESSMENT — CUP TO DISC RATIO: OD_RATIO: 0.8

## 2023-10-20 NOTE — PATIENT INSTRUCTIONS
Surgeon: Elzbieta Salcido MD      Patient name: Lia Jeong    Date of visit: 10/20/23      right eye    Prednisolone acetate (pink or white cap) - 4 times a day until seen by Dr. Boothe, then taper per her instructions    Ofloxacin (tan cap) - 4 times a day     Ketorolac (gray cap) - 4 times a day     Left eye    Prednisolone acetate (pink or white cap) - 2 times a day until seen by Dr. Boothe, then taper per her instructions    No heavy lifting over 10-15 lbs, no bending over, do not get eye wet, no eye rubbing. Wear eye shield at bedtime for 1 week and sunglasses/glasses during the day. Please call immediately if you develop any redness, pain, decreased vision, flashes, floaters.       Surgical Scheduler (during office hours): Radhika: [phone]  Office phone (after hours): [phone]  Hospital : [phone]                     Pager: [phone] for Dr. Carson

## 2023-10-26 ENCOUNTER — OFFICE VISIT (OUTPATIENT)
Dept: OPHTHALMOLOGY | Facility: CLINIC | Age: 73
End: 2023-10-26
Payer: MEDICARE

## 2023-10-26 DIAGNOSIS — H40.1131 PRIMARY OPEN ANGLE GLAUCOMA (POAG) OF BOTH EYES, MILD STAGE: ICD-10-CM

## 2023-10-26 DIAGNOSIS — Z96.1 PSEUDOPHAKIA: Primary | ICD-10-CM

## 2023-10-26 RX ORDER — OFLOXACIN 3 MG/ML
SOLUTION/ DROPS OPHTHALMIC
COMMUNITY
Start: 2023-10-21

## 2023-10-26 ASSESSMENT — ENCOUNTER SYMPTOMS
ALLERGIC/IMMUNOLOGIC NEGATIVE: 0
MUSCULOSKELETAL NEGATIVE: 0
GASTROINTESTINAL NEGATIVE: 0
CARDIOVASCULAR NEGATIVE: 0
CONSTITUTIONAL NEGATIVE: 0
RESPIRATORY NEGATIVE: 0
NEUROLOGICAL NEGATIVE: 0
EYES NEGATIVE: 1
HEMATOLOGIC/LYMPHATIC NEGATIVE: 0
PSYCHIATRIC NEGATIVE: 0
ENDOCRINE NEGATIVE: 0

## 2023-10-26 ASSESSMENT — TONOMETRY
IOP_METHOD: GOLDMANN APPLANATION
OD_IOP_MMHG: 19
OS_IOP_MMHG: 14

## 2023-10-26 ASSESSMENT — REFRACTION_MANIFEST
OD_CYLINDER: -2.00
OD_SPHERE: +1.75
OD_AXIS: 091
OD_SPHERE: +1.25
OD_ADD: +2.75
METHOD_AUTOREFRACTION: 1
OD_CYLINDER: -2.00
OS_CYLINDER: -1.00
OS_SPHERE: PLANO
OS_CYLINDER: -0.75
OS_ADD: +2.75
OD_AXIS: 090
OS_AXIS: 067
OS_SPHERE: +0.25
OS_AXIS: 070

## 2023-10-26 ASSESSMENT — SLIT LAMP EXAM - LIDS
COMMENTS: NORMAL
COMMENTS: NORMAL

## 2023-10-26 ASSESSMENT — VISUAL ACUITY
OD_PH_SC: 20/25
OD_SC: 20/50
OS_SC: 20/25
METHOD: SNELLEN - LINEAR

## 2023-10-26 ASSESSMENT — EXTERNAL EXAM - RIGHT EYE: OD_EXAM: NORMAL

## 2023-10-26 ASSESSMENT — EXTERNAL EXAM - LEFT EYE: OS_EXAM: NORMAL

## 2023-10-26 NOTE — PROGRESS NOTES
Assessment/Plan   Diagnoses and all orders for this visit:  Pseudophakia left eye (OS)   Co-Management with Dr. Carson after Cataract surgery OS  Date of surgery:  September 28, 2023  Date care was transferred:  October 26, 2023    Pseudophakia left eye (right eye (OD))  Co-Management with Dr. Carson after Cataract surgery OD  Date of surgery:  October 19, 2023  Date care was transferred:  October 26, 2023    Primary Open angle glaucoma both eyes (OU)  Stable intraocular pressure (IOP)  -continue with Brimonidine both eyes (OU) bid  -continue with Dorxolamide both eyes (OU) bid  -continue with Lumigan both eyes (OU) at bedtime    Post op precautions reviewed with patient and written instructions given    Return for a dilated exam in   2 weeks or sooner if having any problems

## 2023-10-27 ENCOUNTER — APPOINTMENT (OUTPATIENT)
Dept: OPHTHALMOLOGY | Facility: CLINIC | Age: 73
End: 2023-10-27
Payer: MEDICARE

## 2023-11-15 ENCOUNTER — OFFICE VISIT (OUTPATIENT)
Dept: OPHTHALMOLOGY | Facility: CLINIC | Age: 73
End: 2023-11-15
Payer: MEDICARE

## 2023-11-15 DIAGNOSIS — E11.39: Primary | ICD-10-CM

## 2023-11-15 DIAGNOSIS — H04.123 DRY EYES: ICD-10-CM

## 2023-11-15 DIAGNOSIS — Z96.1 PSEUDOPHAKIA: ICD-10-CM

## 2023-11-15 ASSESSMENT — TONOMETRY
OS_IOP_MMHG: 14
OD_IOP_MMHG: 16
IOP_METHOD: GOLDMANN APPLANATION

## 2023-11-15 ASSESSMENT — VISUAL ACUITY
METHOD: SNELLEN - LINEAR
OS_SC: 20/30
OD_SC: 20/30

## 2023-11-15 ASSESSMENT — REFRACTION_MANIFEST
OS_CYLINDER: -1.00
OS_AXIS: 065
OD_CYLINDER: -0.75
OD_ADD: +3.00
OD_SPHERE: +0.25
OD_AXIS: 080
OD_SPHERE: +0.50
OS_CYLINDER: -1.25
OS_SPHERE: +0.75
OD_CYLINDER: -0.75
OS_SPHERE: +0.75
OS_ADD: +3.00
OS_AXIS: 065
METHOD_AUTOREFRACTION: 1
OD_AXIS: 080

## 2023-11-15 ASSESSMENT — SLIT LAMP EXAM - LIDS
COMMENTS: NORMAL
COMMENTS: NORMAL

## 2023-11-15 ASSESSMENT — EXTERNAL EXAM - RIGHT EYE: OD_EXAM: NORMAL

## 2023-11-15 ASSESSMENT — EXTERNAL EXAM - LEFT EYE: OS_EXAM: NORMAL

## 2023-11-15 NOTE — PROGRESS NOTES
Assessment/Plan   Diagnoses and all orders for this visit:  Controlled type 2 diabetes mellitus with other ophthalmic complication, unspecified whether long term insulin use (CMS/[facility])  -return to see [facility] for diabetes monitoring    Pseudophakia  -stable post-op course both eyes (OU)  -off of all post op meds in right eye (OD)  Left eye (OS):   -discontinue Ofloxacin, start to taper off Pred Forte: tid for 1 week, bid the following week, every day the week after that, then discontinue  -finish Ketorolac drops, then stop    Dry eyes  -artificial tears both eyes (OU) qid    Return in  1 month(s) for follow up or sooner if having any problems  Refract at next visit    Co-Management with Dr. Carson after Cataract surgery OS  Date of surgery:  Sept. 28, 2023  Date care was transferred:  Oct. 26,  2023    Co-Management with Dr. Carson after Cataract surgery OD  Date of surgery:  October 19, 2023  Date care was transferred:  October 26, 2023

## 2023-12-18 ENCOUNTER — OFFICE VISIT (OUTPATIENT)
Dept: NEUROLOGY | Facility: CLINIC | Age: 73
End: 2023-12-18
Payer: MEDICARE

## 2023-12-18 VITALS
TEMPERATURE: 96 F | BODY MASS INDEX: 36.37 KG/M2 | HEART RATE: 64 BPM | WEIGHT: 213 LBS | HEIGHT: 64 IN | SYSTOLIC BLOOD PRESSURE: 146 MMHG | DIASTOLIC BLOOD PRESSURE: 67 MMHG

## 2023-12-18 DIAGNOSIS — G25.0 ESSENTIAL TREMOR: Primary | ICD-10-CM

## 2023-12-18 DIAGNOSIS — E11.42 DIABETIC PERIPHERAL NEUROPATHY (MULTI): Chronic | ICD-10-CM

## 2023-12-18 PROCEDURE — 1160F RVW MEDS BY RX/DR IN RCRD: CPT | Performed by: PSYCHIATRY & NEUROLOGY

## 2023-12-18 PROCEDURE — 3052F HG A1C>EQUAL 8.0%<EQUAL 9.0%: CPT | Performed by: PSYCHIATRY & NEUROLOGY

## 2023-12-18 PROCEDURE — 3077F SYST BP >= 140 MM HG: CPT | Performed by: PSYCHIATRY & NEUROLOGY

## 2023-12-18 PROCEDURE — 1125F AMNT PAIN NOTED PAIN PRSNT: CPT | Performed by: PSYCHIATRY & NEUROLOGY

## 2023-12-18 PROCEDURE — 1159F MED LIST DOCD IN RCRD: CPT | Performed by: PSYCHIATRY & NEUROLOGY

## 2023-12-18 PROCEDURE — 3078F DIAST BP <80 MM HG: CPT | Performed by: PSYCHIATRY & NEUROLOGY

## 2023-12-18 PROCEDURE — 99213 OFFICE O/P EST LOW 20 MIN: CPT | Performed by: PSYCHIATRY & NEUROLOGY

## 2023-12-18 PROCEDURE — 1036F TOBACCO NON-USER: CPT | Performed by: PSYCHIATRY & NEUROLOGY

## 2023-12-18 RX ORDER — PRIMIDONE 50 MG/1
100 TABLET ORAL 2 TIMES DAILY
Qty: 120 TABLET | Refills: 3 | Status: SHIPPED | OUTPATIENT
Start: 2023-12-18 | End: 2024-04-16

## 2023-12-18 NOTE — PATIENT INSTRUCTIONS
Primidone 50 mg tablets:    1 in the evening for 1 week  1 twice daily for 1 week  1 in morning, 2 in evening for 1 week  2 twice daily.    If you have drowsiness at any dose, back down to the previous one.

## 2023-12-18 NOTE — LETTER
December 18, 2023     Thalia Gray MD   [address]  [address]  [facility] [address]    Patient: Lia Jeong   YOB: 1950   Date of Visit: 12/18/2023       Dear Dr. Thalia Gray MD:    Thank you for allowing me to continue in the care of Lia Jeong. Below are my notes for this visit.  If you have questions, please do not hesitate to call me. I look forward to following your patient along with you.       Sincerely,     Johnathan Buenrostro MD      CC: No Recipients  ______________________________________________________________________________________    NEUROLOGY OUTPATIENT FOLLOW-UP NOTE    Assessment/Plan  Diagnoses and all orders for this visit:  Essential tremor  -     primidone (Mysoline) 50 mg tablet; Take 2 tablets (100 mg) by mouth 2 times a day.  Diabetic peripheral neuropathy (CMS/HCC)      IMPRESSION:  Benign essential tremor, which while truly minimal on exam today, is episodically very bothersome for the patient..  Diabetic neuropathy.    PLAN:  I restarted primidone with planned titration to 100 mg bid.  To continue gabapentin 1200 mg (2 x 600 mg) bid for the peripheral neuropathy.  I will see her again in 3-4 months or prn.    Johnathan Buenrostro Jr., M.D., FAAN     ----------    Subjective    Lia Jeong is a 73 y.o. year old female here for follow-up.    She stopped taking primidone months ago because she felt it wasn't effective (was taking primidone once a day, though).  She continues on gabapentin for the neuropathy, as pregabalin caused cognitive side effects as well.      Patient Active Problem List   Diagnosis   • Asthma   • (HFpEF) heart failure with preserved ejection fraction (CMS/HCC)   • Pulmonary embolism (CMS/HCC)   • Anticardiolipin antibody syndrome (CMS/HCC)   • Asymmetric SNHL (sensorineural hearing loss)   • Myopia of both eyes   • Carpal tunnel syndrome of right wrist   • Cataract, nuclear sclerotic, both eyes   • Chronic pansinusitis   • Diabetic  peripheral neuropathy (CMS/[facility])   • Diverticulosis of colon   • DNS (deviated nasal septum)   • Dry eyes, bilateral   • Edema   • Essential hypertension   • Diabetes mellitus with neurological manifestations (CMS/[facility])   • Essential tremor   • Glaucoma suspect, both eyes   • H/O blood clots   • Mixed hyperlipidemia   • Hx of adenomatous colonic polyps   • Hyperopia of both eyes with regular astigmatism   • Astigmatism of both eyes   • 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 (CMS/[facility])   • Internal hemorrhoid   • Leg edema   • Mild persistent asthma without complication   • Obesity, Class II, BMI 35-39.9   • Obstructive sleep apnea   • Primary open angle glaucoma of both eyes, moderate stage   • Small fiber neuropathy   • SOB (shortness of breath) on exertion   • Chest pain   • Vitamin D deficiency, unspecified   • Insulin resistance   • Type 2 diabetes with stage 2 chronic kidney disease GFR 60-89 (CMS/[facility])   • Mid-cervical disc disorder, unspecified level   • Combined form of age-related cataract, right eye   • Pseudophakia   • Mild nonproliferative diabetic retinopathy of both eyes without macular edema associated with type 2 diabetes mellitus (CMS/[facility])   • Presbyopia   • Chronic obstructive pulmonary disease (CMS/[facility])     Past Medical History:   Diagnosis Date   • Asthma    • Cataract    • CHF (congestive heart failure) (CMS/[facility])    • Clotting disorder (CMS/[facility])    • Diabetes mellitus (CMS/[facility])    • GERD (gastroesophageal reflux disease)    • Hyperlipidemia    • Hypertension    • Peripheral neuropathy    • Pulmonary embolus (CMS/[facility])    • Sleep apnea      Past Surgical History:   Procedure Laterality Date   • CATARACT EXTRACTION     • EYE SURGERY  03/19/2015    Eye Surgery   • MR HEAD ANGIO WO IV CONTRAST  05/18/2022    MR HEAD ANGIO WO IV CONTRAST 5/18/2022 [facility]   • MR NECK ANGIO WO IV CONTRAST  05/18/2022    MR NECK ANGIO WO IV CONTRAST 5/18/2022 [facility]   • TONSILLECTOMY  09/28/2017     Tonsillectomy   • TUBAL LIGATION  09/28/2017    Tubal Ligation     Social History     Tobacco Use   • Smoking status: Never     Passive exposure: Never   • Smokeless tobacco: Never   • Tobacco comments:     Pt was hospitalized from 9/30/23 to Oct 5, 2023 with exacerbation of asthma, acute episode of chronic diastolic heart failure and acute bronchitits.  Pt denies any further SOB and feels she can lay flat for 30 minutes for her procedure.   Substance Use Topics   • Alcohol use: Never     family history includes Diabetes in her mother; Hyperlipidemia in her mother; Hypertension in her mother.    Current Outpatient Medications:   •  atorvastatin (Lipitor) 80 mg tablet, Take 1 tablet (80 mg) by mouth once daily., Disp: , Rfl:   •  bimatoprost (Lumigan) 0.01 % ophthalmic solution, Administer 1 drop into both eyes once daily at bedtime., Disp: , Rfl:   •  brimonidine (AlphaGAN P) 0.2 % ophthalmic solution, Administer 1 drop into both eyes 2 times a day., Disp: , Rfl:   •  cloNIDine (Catapres) 0.1 mg tablet, Take 1 tablet (0.1 mg) by mouth twice a day., Disp: , Rfl:   •  dilTIAZem LA (Cardizem LA) 180 mg 24 hr tablet, Take 1 tablet (180 mg) by mouth once daily., Disp: , Rfl:   •  dorzolamide (Trusopt) 2 % ophthalmic solution, Administer 1 drop into both eyes 3 times a day., Disp: , Rfl:   •  DULoxetine (Cymbalta) 30 mg DR capsule, Take 1 capsule (30 mg) by mouth once daily., Disp: , Rfl:   •  Farxiga 5 mg, Take 1 tablet (5 mg) by mouth once daily with a meal., Disp: , Rfl:   •  fluticasone (Flonase) 50 mcg/actuation nasal spray, Administer 1 spray into each nostril once daily., Disp: , Rfl:   •  fluticasone propion-salmeteroL (Advair Diskus) 500-50 mcg/dose diskus inhaler, Inhale 1 puff 2 times a day., Disp: , Rfl:   •  gabapentin (Neurontin) 600 mg tablet, Take 2 tablets (1,200 mg) by mouth 3 times a day., Disp: , Rfl:   •  hydrALAZINE (Apresoline) 50 mg tablet, Take 0.5 tablets (25 mg) by mouth 3 times a day.  *Please note change in dose, Disp: 45 tablet, Rfl: 0  •  insulin NPH and regular human (NovoLIN) 100 unit/mL (70-30) injection, Inject 45 Units under the skin 2 times a day before meals., Disp: , Rfl:   •  ipratropium-albuteroL (Duo-Neb) 0.5-2.5 mg/3 mL nebulizer solution, Take 3 mL by nebulization 4 times a day., Disp: , Rfl:   •  ketorolac (Acular) 0.5 % ophthalmic solution, 1 drop in operative eye starting 1 day before surgery Do not start before October 18, 2023., Disp: 5 mL, Rfl: 2  •  metFORMIN XR (Glucophage-XR) 500 mg 24 hr tablet, Take 2 tablets (1,000 mg) by mouth 2 times a day with meals., Disp: , Rfl:   •  metoprolol tartrate (Lopressor) 50 mg tablet, Take 1 tablet by mouth 2 times a day., Disp: , Rfl:   •  montelukast (Singulair) 10 mg tablet, Take 1 tablet (10 mg) by mouth once daily at bedtime., Disp: , Rfl:   •  ofloxacin (Ocuflox) 0.3 % ophthalmic solution, INSTILL 1 DROP IN OPERATIVE EYE 4 TIMES DAILY STARTING 1 DAY PRIOR TO SURGERY DATE, Disp: , Rfl:   •  omeprazole (PriLOSEC) 20 mg DR capsule, Take 2 capsules (40 mg) by mouth once daily in the morning. Take before meals., Disp: , Rfl:   •  pioglitazone (Actos) 15 mg tablet, Take 1 tablet (15 mg) by mouth once daily., Disp: , Rfl:   •  prednisoLONE acetate (Pred-Forte) 1 % ophthalmic suspension, 1 drop.  INTO THE OPERATIVE EYE 4 TIMES DAILY STARTING AFTER SURGERY, Disp: , Rfl:   •  primidone (Mysoline) 50 mg tablet, Take 1 tablet (50 mg) by mouth 3 times a day., Disp: , Rfl:   •  topiramate (Topamax) 25 mg tablet, Take 2 tablets (50 mg) by mouth 2 times a day., Disp: , Rfl:   •  torsemide (Demadex) 20 mg tablet, Take 1 tablet (20 mg) by mouth once daily., Disp: 30 tablet, Rfl: 0  •  traMADol (Ultram) 50 mg tablet, Take 1 tablet (50 mg) by mouth every 8 hours if needed for moderate pain (4 - 6)., Disp: , Rfl:   •  warfarin (Coumadin) 10 mg tablet, Take 1 tablet (10 mg) by mouth. Take as directed per After Visit Summary. Pt takes Tuesday, Thursday,  Saturday and Sunday., Disp: , Rfl:   •  warfarin (Coumadin) 5 mg tablet, Take 2 tablets (10 mg) by mouth 3 (three) times a week. Monday/Thursday/Friday, Disp: , Rfl:   •  warfarin (Coumadin) 5 mg tablet, Take 2.5 tablets (12.5 mg) by mouth 4 times a week. Tue/Wed/Sat/Sun, Disp: , Rfl:   •  warfarin (Coumadin) 7.5 mg tablet, Take 1 tablet (7.5 mg) by mouth. Take as directed per After Visit Summary. Pt takes Monday, Wednesday and Friday., Disp: , Rfl:   Allergies   Allergen Reactions   • Amoxicillin Diarrhea   • Amoxicillin-Pot Clavulanate Diarrhea     SEVERE DIARRHEA   • Hydrochlorothiazide Other     DECREASE SEXUAL LIBIDO    Low libido   • Lisinopril Cough     COUGH   • Losartan Cough       Objective    There were no vitals taken for this visit.    CONSTITUTIONAL:  No acute distress    MENTAL STATUS:  Awake, alert, fully oriented to self, place, and time, with present short-term memory, good awareness of recent events, normal attention span, concentration, and fund of knowledge.    SPEECH AND LANGUAGE:  Can name and repeat, follows all commands, has no dysarthria    CRANIAL NERVES:  II-Vision present, visual fields full to confrontational testing    III/IV/VI--EOMs are present in all directions.  Pupils are symmetrically reactive in dim light.  No ptosis.    V--Normal facial sensation.    VII--No facial asymmetry.    VIII--Hearing present to voice bilaterally.    IX/X--Symmetric soft palate rise.    XI--Normal trapezius power bilaterally.    XII--Tongue protrudes without deviation.    MOTOR:  Truly minimal action tremor of the hands today.  No rest tremor or cogwheeling.  Normal power, tone, and bulk in both arms and both legs.    SENSORY:  Reduced pin sensation in a stocking-glove distribution from the feet to the knees, and from the hands to the mid-forearms.  No sensory asymmetry or spinal sensory level.    COORDINATION:  Normal finger-to-nose and heel-to-shin testing in both arms and both legs.    REFLEXES are  normal and symmetric at the biceps, triceps, brachioradialis, absent at the patella, and ankle.  The plantar responses are flexor.    GAIT is normal, without steppage, ataxia, shuffling, or spasticity.      Johnathan Buenrostro Jr., M.D., [facility]

## 2023-12-18 NOTE — PROGRESS NOTES
NEUROLOGY OUTPATIENT FOLLOW-UP NOTE    Assessment/Plan   Diagnoses and all orders for this visit:  Essential tremor  -     primidone (Mysoline) 50 mg tablet; Take 2 tablets (100 mg) by mouth 2 times a day.  Diabetic peripheral neuropathy (CMS/HCC)      IMPRESSION:  Benign essential tremor, which while truly minimal on exam today, is episodically very bothersome for the patient..  Diabetic neuropathy.    PLAN:  I restarted primidone with planned titration to 100 mg bid.  To continue gabapentin 1200 mg (2 x 600 mg) bid for the peripheral neuropathy.  I will see her again in 3-4 months or prn.    Johnathan Buenrostro Jr., M.D., FAAN     ----------    Subjective     Lia Jeong is a 73 y.o. year old female here for follow-up.    She stopped taking primidone months ago because she felt it wasn't effective (was taking primidone once a day, though).  She continues on gabapentin for the neuropathy, as pregabalin caused cognitive side effects as well.      Patient Active Problem List   Diagnosis    Asthma    (HFpEF) heart failure with preserved ejection fraction (CMS/HCC)    Pulmonary embolism (CMS/HCC)    Anticardiolipin antibody syndrome (CMS/HCC)    Asymmetric SNHL (sensorineural hearing loss)    Myopia of both eyes    Carpal tunnel syndrome of right wrist    Cataract, nuclear sclerotic, both eyes    Chronic pansinusitis    Diabetic peripheral neuropathy (CMS/HCC)    Diverticulosis of colon    DNS (deviated nasal septum)    Dry eyes, bilateral    Edema    Essential hypertension    Diabetes mellitus with neurological manifestations (CMS/HCC)    Essential tremor    Glaucoma suspect, both eyes    H/O blood clots    Mixed hyperlipidemia    Hx of adenomatous colonic polyps    Hyperopia of both eyes with regular astigmatism    Astigmatism of both eyes    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 (CMS/HCC)    Internal hemorrhoid    Leg edema    Mild persistent asthma without complication    Obesity, Class II, BMI  35-39.9    Obstructive sleep apnea    Primary open angle glaucoma of both eyes, moderate stage    Small fiber neuropathy    SOB (shortness of breath) on exertion    Chest pain    Vitamin D deficiency, unspecified    Insulin resistance    Type 2 diabetes with stage 2 chronic kidney disease GFR 60-89 (CMS/[facility])    Mid-cervical disc disorder, unspecified level    Combined form of age-related cataract, right eye    Pseudophakia    Mild nonproliferative diabetic retinopathy of both eyes without macular edema associated with type 2 diabetes mellitus (CMS/[facility])    Presbyopia    Chronic obstructive pulmonary disease (CMS/[facility])     Past Medical History:   Diagnosis Date    Asthma     Cataract     CHF (congestive heart failure) (CMS/[facility])     Clotting disorder (CMS/[facility])     Diabetes mellitus (CMS/[facility])     GERD (gastroesophageal reflux disease)     Hyperlipidemia     Hypertension     Peripheral neuropathy     Pulmonary embolus (CMS/[facility])     Sleep apnea      Past Surgical History:   Procedure Laterality Date    CATARACT EXTRACTION      EYE SURGERY  03/19/2015    Eye Surgery    MR HEAD ANGIO WO IV CONTRAST  05/18/2022    MR HEAD ANGIO WO IV CONTRAST 5/18/2022 [facility]    MR NECK ANGIO WO IV CONTRAST  05/18/2022    MR NECK ANGIO WO IV CONTRAST 5/18/2022 [facility]    TONSILLECTOMY  09/28/2017    Tonsillectomy    TUBAL LIGATION  09/28/2017    Tubal Ligation     Social History     Tobacco Use    Smoking status: Never     Passive exposure: Never    Smokeless tobacco: Never    Tobacco comments:     Pt was hospitalized from 9/30/23 to Oct 5, 2023 with exacerbation of asthma, acute episode of chronic diastolic heart failure and acute bronchitits.  Pt denies any further SOB and feels she can lay flat for 30 minutes for her procedure.   Substance Use Topics    Alcohol use: Never     family history includes Diabetes in her mother; Hyperlipidemia in her mother; Hypertension in her mother.    Current Outpatient Medications:      atorvastatin (Lipitor) 80 mg tablet, Take 1 tablet (80 mg) by mouth once daily., Disp: , Rfl:     bimatoprost (Lumigan) 0.01 % ophthalmic solution, Administer 1 drop into both eyes once daily at bedtime., Disp: , Rfl:     brimonidine (AlphaGAN P) 0.2 % ophthalmic solution, Administer 1 drop into both eyes 2 times a day., Disp: , Rfl:     cloNIDine (Catapres) 0.1 mg tablet, Take 1 tablet (0.1 mg) by mouth twice a day., Disp: , Rfl:     dilTIAZem LA (Cardizem LA) 180 mg 24 hr tablet, Take 1 tablet (180 mg) by mouth once daily., Disp: , Rfl:     dorzolamide (Trusopt) 2 % ophthalmic solution, Administer 1 drop into both eyes 3 times a day., Disp: , Rfl:     DULoxetine (Cymbalta) 30 mg DR capsule, Take 1 capsule (30 mg) by mouth once daily., Disp: , Rfl:     Farxiga 5 mg, Take 1 tablet (5 mg) by mouth once daily with a meal., Disp: , Rfl:     fluticasone (Flonase) 50 mcg/actuation nasal spray, Administer 1 spray into each nostril once daily., Disp: , Rfl:     fluticasone propion-salmeteroL (Advair Diskus) 500-50 mcg/dose diskus inhaler, Inhale 1 puff 2 times a day., Disp: , Rfl:     gabapentin (Neurontin) 600 mg tablet, Take 2 tablets (1,200 mg) by mouth 3 times a day., Disp: , Rfl:     hydrALAZINE (Apresoline) 50 mg tablet, Take 0.5 tablets (25 mg) by mouth 3 times a day. *Please note change in dose, Disp: 45 tablet, Rfl: 0    insulin NPH and regular human (NovoLIN) 100 unit/mL (70-30) injection, Inject 45 Units under the skin 2 times a day before meals., Disp: , Rfl:     ipratropium-albuteroL (Duo-Neb) 0.5-2.5 mg/3 mL nebulizer solution, Take 3 mL by nebulization 4 times a day., Disp: , Rfl:     ketorolac (Acular) 0.5 % ophthalmic solution, 1 drop in operative eye starting 1 day before surgery Do not start before October 18, 2023., Disp: 5 mL, Rfl: 2    metFORMIN XR (Glucophage-XR) 500 mg 24 hr tablet, Take 2 tablets (1,000 mg) by mouth 2 times a day with meals., Disp: , Rfl:     metoprolol tartrate (Lopressor) 50 mg  tablet, Take 1 tablet by mouth 2 times a day., Disp: , Rfl:     montelukast (Singulair) 10 mg tablet, Take 1 tablet (10 mg) by mouth once daily at bedtime., Disp: , Rfl:     ofloxacin (Ocuflox) 0.3 % ophthalmic solution, INSTILL 1 DROP IN OPERATIVE EYE 4 TIMES DAILY STARTING 1 DAY PRIOR TO SURGERY DATE, Disp: , Rfl:     omeprazole (PriLOSEC) 20 mg DR capsule, Take 2 capsules (40 mg) by mouth once daily in the morning. Take before meals., Disp: , Rfl:     pioglitazone (Actos) 15 mg tablet, Take 1 tablet (15 mg) by mouth once daily., Disp: , Rfl:     prednisoLONE acetate (Pred-Forte) 1 % ophthalmic suspension, 1 drop.  INTO THE OPERATIVE EYE 4 TIMES DAILY STARTING AFTER SURGERY, Disp: , Rfl:     primidone (Mysoline) 50 mg tablet, Take 1 tablet (50 mg) by mouth 3 times a day., Disp: , Rfl:     topiramate (Topamax) 25 mg tablet, Take 2 tablets (50 mg) by mouth 2 times a day., Disp: , Rfl:     torsemide (Demadex) 20 mg tablet, Take 1 tablet (20 mg) by mouth once daily., Disp: 30 tablet, Rfl: 0    traMADol (Ultram) 50 mg tablet, Take 1 tablet (50 mg) by mouth every 8 hours if needed for moderate pain (4 - 6)., Disp: , Rfl:     warfarin (Coumadin) 10 mg tablet, Take 1 tablet (10 mg) by mouth. Take as directed per After Visit Summary. Pt takes Tuesday, Thursday, Saturday and Sunday., Disp: , Rfl:     warfarin (Coumadin) 5 mg tablet, Take 2 tablets (10 mg) by mouth 3 (three) times a week. Monday/Thursday/Friday, Disp: , Rfl:     warfarin (Coumadin) 5 mg tablet, Take 2.5 tablets (12.5 mg) by mouth 4 times a week. Tue/Wed/Sat/Sun, Disp: , Rfl:     warfarin (Coumadin) 7.5 mg tablet, Take 1 tablet (7.5 mg) by mouth. Take as directed per After Visit Summary. Pt takes Monday, Wednesday and Friday., Disp: , Rfl:   Allergies   Allergen Reactions    Amoxicillin Diarrhea    Amoxicillin-Pot Clavulanate Diarrhea     SEVERE DIARRHEA    Hydrochlorothiazide Other     DECREASE SEXUAL LIBIDO    Low libido    Lisinopril Cough     COUGH     Losartan Cough       Objective     There were no vitals taken for this visit.    CONSTITUTIONAL:  No acute distress    MENTAL STATUS:  Awake, alert, fully oriented to self, place, and time, with present short-term memory, good awareness of recent events, normal attention span, concentration, and fund of knowledge.    SPEECH AND LANGUAGE:  Can name and repeat, follows all commands, has no dysarthria    CRANIAL NERVES:  II-Vision present, visual fields full to confrontational testing    III/IV/VI--EOMs are present in all directions.  Pupils are symmetrically reactive in dim light.  No ptosis.    V--Normal facial sensation.    VII--No facial asymmetry.    VIII--Hearing present to voice bilaterally.    IX/X--Symmetric soft palate rise.    XI--Normal trapezius power bilaterally.    XII--Tongue protrudes without deviation.    MOTOR:  Truly minimal action tremor of the hands today.  No rest tremor or cogwheeling.  Normal power, tone, and bulk in both arms and both legs.    SENSORY:  Reduced pin sensation in a stocking-glove distribution from the feet to the knees, and from the hands to the mid-forearms.  No sensory asymmetry or spinal sensory level.    COORDINATION:  Normal finger-to-nose and heel-to-shin testing in both arms and both legs.    REFLEXES are normal and symmetric at the biceps, triceps, brachioradialis, absent at the patella, and ankle.  The plantar responses are flexor.    GAIT is normal, without steppage, ataxia, shuffling, or spasticity.      Johnathan Buenrostro Jr., M.D., [facility]

## 2023-12-26 ENCOUNTER — LAB (OUTPATIENT)
Dept: LAB | Facility: LAB | Age: 73
End: 2023-12-26
Payer: MEDICARE

## 2023-12-26 DIAGNOSIS — I50.33 ACUTE ON CHRONIC HEART FAILURE WITH PRESERVED EJECTION FRACTION (MULTI): Chronic | ICD-10-CM

## 2023-12-26 LAB
ANION GAP SERPL CALC-SCNC: 13 MMOL/L (ref 10–20)
BUN SERPL-MCNC: 20 MG/DL (ref 6–23)
CALCIUM SERPL-MCNC: 9.4 MG/DL (ref 8.6–10.6)
CHLORIDE SERPL-SCNC: 103 MMOL/L (ref 98–107)
CO2 SERPL-SCNC: 28 MMOL/L (ref 21–32)
CREAT SERPL-MCNC: 0.77 MG/DL (ref 0.5–1.05)
GFR SERPL CREATININE-BSD FRML MDRD: 82 ML/MIN/1.73M*2
GLUCOSE SERPL-MCNC: 281 MG/DL (ref 74–99)
POTASSIUM SERPL-SCNC: 4.7 MMOL/L (ref 3.5–5.3)
SODIUM SERPL-SCNC: 139 MMOL/L (ref 136–145)

## 2023-12-26 PROCEDURE — 36415 COLL VENOUS BLD VENIPUNCTURE: CPT

## 2023-12-26 PROCEDURE — 80048 BASIC METABOLIC PNL TOTAL CA: CPT

## 2023-12-28 ENCOUNTER — OFFICE VISIT (OUTPATIENT)
Dept: OPHTHALMOLOGY | Facility: CLINIC | Age: 73
End: 2023-12-28
Payer: MEDICARE

## 2023-12-28 DIAGNOSIS — H43.392 VITREOUS FLOATERS OF LEFT EYE: ICD-10-CM

## 2023-12-28 DIAGNOSIS — H40.1132 PRIMARY OPEN ANGLE GLAUCOMA OF BOTH EYES, MODERATE STAGE: ICD-10-CM

## 2023-12-28 DIAGNOSIS — H52.03 HYPEROPIA OF BOTH EYES: ICD-10-CM

## 2023-12-28 DIAGNOSIS — H52.4 PRESBYOPIA: ICD-10-CM

## 2023-12-28 DIAGNOSIS — H04.123 DRY EYES: ICD-10-CM

## 2023-12-28 DIAGNOSIS — H52.223 REGULAR ASTIGMATISM OF BOTH EYES: ICD-10-CM

## 2023-12-28 DIAGNOSIS — Z96.1 PSEUDOPHAKIA: Primary | ICD-10-CM

## 2023-12-28 PROCEDURE — 92015 DETERMINE REFRACTIVE STATE: CPT | Performed by: OPHTHALMOLOGY

## 2023-12-28 ASSESSMENT — REFRACTION_MANIFEST
OS_CYLINDER: -1.25
OS_CYLINDER: -0.25
OD_CYLINDER: -1.25
OS_SPHERE: +0.75
OS_ADD: +2.50
METHOD_AUTOREFRACTION: 1
OD_ADD: +2.50
OD_SPHERE: +1.00
OD_AXIS: 080
OS_AXIS: 075
OD_SPHERE: +1.00
OD_CYLINDER: -1.00
OS_AXIS: 075
OD_AXIS: 080
OS_SPHERE: +0.75

## 2023-12-28 ASSESSMENT — TONOMETRY
OD_IOP_MMHG: 12
OS_IOP_MMHG: 13
IOP_METHOD: GOLDMANN APPLANATION

## 2023-12-28 ASSESSMENT — CUP TO DISC RATIO
OS_RATIO: 0.6
OD_RATIO: 0.7

## 2023-12-28 ASSESSMENT — SLIT LAMP EXAM - LIDS
COMMENTS: NORMAL
COMMENTS: NORMAL

## 2023-12-28 ASSESSMENT — EXTERNAL EXAM - RIGHT EYE: OD_EXAM: NORMAL

## 2023-12-28 ASSESSMENT — VISUAL ACUITY
METHOD: SNELLEN - LINEAR
OD_SC: 20/25
OS_SC: 20/25-2

## 2023-12-28 ASSESSMENT — EXTERNAL EXAM - LEFT EYE: OS_EXAM: NORMAL

## 2023-12-28 ASSESSMENT — PACHYMETRY
OS_CT(UM): 540
OD_CT(UM): 565

## 2023-12-28 NOTE — PROGRESS NOTES
Assessment/Plan   Diagnoses and all orders for this visit:  Pseudophakia  Co-Management with Dr. Carson after Cataract surgery left  eye  Date of surgery:  September 28, 2023  Date care was transferred:  October 26, 2023    Co-Management with Dr. Carson after Cataract surgery right eye  Date of surgery:  October 18, 2023  Date care was transferred:  October 26, 2023      Dry eyes  -Start artificial tears both eyes (OU) qid  -stress compliance    Hyperopia of both eyes  Regular astigmatism of both eyes  Presbyopia  Refractive error  -give Rx for new glasses    Vitreous floaters of left eye  -no rd, tears or holes on exam today  -pt was advised to call stat if experiencing increased floaters, flashes, curtains in front of eyes, decreased vision, blurry vision    Primary Open angle glaucoma  -continue with Brimonidine both eyes bid, Dorzolamide both eyes bid, and Lumigan both eyes at bedtime    Return for a dilated exam in   1  months or sooner if having any problems

## 2024-01-11 ENCOUNTER — LAB (OUTPATIENT)
Dept: LAB | Facility: LAB | Age: 74
End: 2024-01-11
Payer: MEDICARE

## 2024-01-11 DIAGNOSIS — R15.9 FULL INCONTINENCE OF FECES: ICD-10-CM

## 2024-01-11 DIAGNOSIS — E11.9 TYPE 2 DIABETES MELLITUS WITHOUT COMPLICATIONS (MULTI): Primary | ICD-10-CM

## 2024-01-11 DIAGNOSIS — I10 ESSENTIAL (PRIMARY) HYPERTENSION: ICD-10-CM

## 2024-01-11 LAB
APPEARANCE UR: CLEAR
BILIRUB UR STRIP.AUTO-MCNC: NEGATIVE MG/DL
COLOR UR: YELLOW
CREAT UR-MCNC: 80.1 MG/DL (ref 20–320)
GLUCOSE UR STRIP.AUTO-MCNC: ABNORMAL MG/DL
HOLD SPECIMEN: NORMAL
KETONES UR STRIP.AUTO-MCNC: NEGATIVE MG/DL
LEUKOCYTE ESTERASE UR QL STRIP.AUTO: NEGATIVE
MICROALBUMIN UR-MCNC: 25.8 MG/L
MICROALBUMIN/CREAT UR: 32.2 UG/MG CREAT
NITRITE UR QL STRIP.AUTO: NEGATIVE
PH UR STRIP.AUTO: 7 [PH]
PROT UR STRIP.AUTO-MCNC: NEGATIVE MG/DL
RBC # UR STRIP.AUTO: NEGATIVE /UL
SP GR UR STRIP.AUTO: 1.01
UROBILINOGEN UR STRIP.AUTO-MCNC: <2 MG/DL

## 2024-01-11 PROCEDURE — 82570 ASSAY OF URINE CREATININE: CPT

## 2024-01-11 PROCEDURE — 82043 UR ALBUMIN QUANTITATIVE: CPT

## 2024-01-11 PROCEDURE — 81003 URINALYSIS AUTO W/O SCOPE: CPT

## 2024-01-30 ENCOUNTER — OFFICE VISIT (OUTPATIENT)
Dept: OPHTHALMOLOGY | Facility: CLINIC | Age: 74
End: 2024-01-30
Payer: MEDICARE

## 2024-01-30 ENCOUNTER — APPOINTMENT (OUTPATIENT)
Dept: OPHTHALMOLOGY | Facility: CLINIC | Age: 74
End: 2024-01-30
Payer: MEDICARE

## 2024-01-30 DIAGNOSIS — H40.1131 PRIMARY OPEN ANGLE GLAUCOMA (POAG) OF BOTH EYES, MILD STAGE: ICD-10-CM

## 2024-01-30 DIAGNOSIS — E11.3293 MILD NONPROLIFERATIVE DIABETIC RETINOPATHY OF BOTH EYES WITHOUT MACULAR EDEMA ASSOCIATED WITH TYPE 2 DIABETES MELLITUS (MULTI): Primary | ICD-10-CM

## 2024-01-30 DIAGNOSIS — H43.813 PVD (POSTERIOR VITREOUS DETACHMENT), BOTH EYES: ICD-10-CM

## 2024-01-30 DIAGNOSIS — Z96.1 PSEUDOPHAKIA: ICD-10-CM

## 2024-01-30 PROCEDURE — 92014 COMPRE OPH EXAM EST PT 1/>: CPT | Performed by: OPHTHALMOLOGY

## 2024-01-30 RX ORDER — DORZOLAMIDE HCL 20 MG/ML
1 SOLUTION/ DROPS OPHTHALMIC 3 TIMES DAILY
Qty: 20 ML | Refills: 0 | Status: SHIPPED | OUTPATIENT
Start: 2024-01-30 | End: 2024-04-24 | Stop reason: SDUPTHER

## 2024-01-30 RX ORDER — BRIMONIDINE TARTRATE 2 MG/ML
1 SOLUTION/ DROPS OPHTHALMIC 2 TIMES DAILY
Qty: 15 ML | Refills: 3 | Status: SHIPPED | OUTPATIENT
Start: 2024-01-30 | End: 2024-04-24 | Stop reason: SDUPTHER

## 2024-01-30 ASSESSMENT — VISUAL ACUITY
OD_SC: 20/20-2
OS_SC: 20/25
METHOD: SNELLEN - LINEAR

## 2024-01-30 ASSESSMENT — EXTERNAL EXAM - RIGHT EYE: OD_EXAM: NORMAL

## 2024-01-30 ASSESSMENT — SLIT LAMP EXAM - LIDS
COMMENTS: NORMAL
COMMENTS: NORMAL

## 2024-01-30 ASSESSMENT — TONOMETRY
OS_IOP_MMHG: 15
IOP_METHOD: GOLDMANN APPLANATION
OD_IOP_MMHG: 17

## 2024-01-30 ASSESSMENT — PACHYMETRY
OD_CT(UM): 565
OS_CT(UM): 540

## 2024-01-30 ASSESSMENT — CUP TO DISC RATIO
OS_RATIO: 0.7
OD_RATIO: 0.7

## 2024-01-30 ASSESSMENT — EXTERNAL EXAM - LEFT EYE: OS_EXAM: NORMAL

## 2024-01-30 NOTE — PROGRESS NOTES
Assessment/Plan   Diagnoses and all orders for this visit:  Mild nonproliferative diabetic retinopathy of both eyes without macular edema associated with type 2 diabetes mellitus (CMS/HCC)  -no evidence of diabetic retinopathy at the present time  -pt was advised of the importance of good diabetes control and the importance of a yearly dilated diabetic exam    PVD (posterior vitreous detachment), both eyes  Primary open angle glaucoma (POAG) of both eyes, mild stage  -pt was advised to call stat if experiencing increased floaters, flashes, curtains in front of eyes, decreased vision, blurry vision  -refer to [facility] for evaluation and management    -     dorzolamide (Trusopt) 2 % ophthalmic solution; Administer 1 drop into both eyes 3 times a day.  -     bimatoprost (Lumigan) 0.01 % ophthalmic solution; Administer 1 drop into both eyes once daily at bedtime.  -     brimonidine (AlphaGAN) 0.2 % ophthalmic solution; Administer 1 drop into both eyes 2 times a day.  --continue with all glaucoma drops    Pseudophakia  continue to monitor    Return in  3 month(s) for follow up or sooner if having any problems  .VF and OCT at next visit

## 2024-04-12 ENCOUNTER — LAB (OUTPATIENT)
Dept: LAB | Facility: LAB | Age: 74
End: 2024-04-12
Payer: MEDICARE

## 2024-04-12 DIAGNOSIS — E11.9 TYPE 2 DIABETES MELLITUS WITHOUT COMPLICATIONS (MULTI): Primary | ICD-10-CM

## 2024-04-12 DIAGNOSIS — I10 ESSENTIAL (PRIMARY) HYPERTENSION: ICD-10-CM

## 2024-04-12 DIAGNOSIS — R15.9 FULL INCONTINENCE OF FECES: ICD-10-CM

## 2024-04-12 LAB
ALBUMIN SERPL BCP-MCNC: 3.6 G/DL (ref 3.4–5)
ALP SERPL-CCNC: 112 U/L (ref 33–136)
ALT SERPL W P-5'-P-CCNC: 23 U/L (ref 7–45)
ANION GAP SERPL CALC-SCNC: 14 MMOL/L (ref 10–20)
AST SERPL W P-5'-P-CCNC: 23 U/L (ref 9–39)
BILIRUB DIRECT SERPL-MCNC: 0.1 MG/DL (ref 0–0.3)
BILIRUB SERPL-MCNC: 0.4 MG/DL (ref 0–1.2)
BUN SERPL-MCNC: 16 MG/DL (ref 6–23)
CALCIUM SERPL-MCNC: 9.6 MG/DL (ref 8.6–10.6)
CHLORIDE SERPL-SCNC: 104 MMOL/L (ref 98–107)
CO2 SERPL-SCNC: 29 MMOL/L (ref 21–32)
CREAT SERPL-MCNC: 0.67 MG/DL (ref 0.5–1.05)
EGFRCR SERPLBLD CKD-EPI 2021: >90 ML/MIN/1.73M*2
GLUCOSE SERPL-MCNC: 69 MG/DL (ref 74–99)
POTASSIUM SERPL-SCNC: 5 MMOL/L (ref 3.5–5.3)
PROT SERPL-MCNC: 6.6 G/DL (ref 6.4–8.2)
SODIUM SERPL-SCNC: 142 MMOL/L (ref 136–145)
TSH SERPL-ACNC: 1.59 MIU/L (ref 0.44–3.98)
VIT B12 SERPL-MCNC: 978 PG/ML (ref 211–911)

## 2024-04-12 PROCEDURE — 85025 COMPLETE CBC W/AUTO DIFF WBC: CPT

## 2024-04-12 PROCEDURE — 82248 BILIRUBIN DIRECT: CPT

## 2024-04-12 PROCEDURE — 82607 VITAMIN B-12: CPT

## 2024-04-12 PROCEDURE — 36415 COLL VENOUS BLD VENIPUNCTURE: CPT

## 2024-04-12 PROCEDURE — 80053 COMPREHEN METABOLIC PANEL: CPT

## 2024-04-12 PROCEDURE — 83036 HEMOGLOBIN GLYCOSYLATED A1C: CPT

## 2024-04-12 PROCEDURE — 84443 ASSAY THYROID STIM HORMONE: CPT

## 2024-04-13 LAB
BASOPHILS # BLD AUTO: 0.04 X10*3/UL (ref 0–0.1)
BASOPHILS NFR BLD AUTO: 0.5 %
EOSINOPHIL # BLD AUTO: 0.1 X10*3/UL (ref 0–0.4)
EOSINOPHIL NFR BLD AUTO: 1.3 %
ERYTHROCYTE [DISTWIDTH] IN BLOOD BY AUTOMATED COUNT: 15 % (ref 11.5–14.5)
EST. AVERAGE GLUCOSE BLD GHB EST-MCNC: 220 MG/DL
HBA1C MFR BLD: 9.3 %
HCT VFR BLD AUTO: 42.9 % (ref 36–46)
HGB BLD-MCNC: 13.2 G/DL (ref 12–16)
IMM GRANULOCYTES # BLD AUTO: 0.06 X10*3/UL (ref 0–0.5)
IMM GRANULOCYTES NFR BLD AUTO: 0.8 % (ref 0–0.9)
LYMPHOCYTES # BLD AUTO: 1.24 X10*3/UL (ref 0.8–3)
LYMPHOCYTES NFR BLD AUTO: 16.7 %
MCH RBC QN AUTO: 26.3 PG (ref 26–34)
MCHC RBC AUTO-ENTMCNC: 30.8 G/DL (ref 32–36)
MCV RBC AUTO: 86 FL (ref 80–100)
MONOCYTES # BLD AUTO: 0.64 X10*3/UL (ref 0.05–0.8)
MONOCYTES NFR BLD AUTO: 8.6 %
NEUTROPHILS # BLD AUTO: 5.35 X10*3/UL (ref 1.6–5.5)
NEUTROPHILS NFR BLD AUTO: 72.1 %
NRBC BLD-RTO: 0 /100 WBCS (ref 0–0)
PLATELET # BLD AUTO: 258 X10*3/UL (ref 150–450)
RBC # BLD AUTO: 5.01 X10*6/UL (ref 4–5.2)
WBC # BLD AUTO: 7.4 X10*3/UL (ref 4.4–11.3)

## 2024-04-22 ENCOUNTER — APPOINTMENT (OUTPATIENT)
Dept: NEUROLOGY | Facility: CLINIC | Age: 74
End: 2024-04-22
Payer: MEDICARE

## 2024-04-24 ENCOUNTER — OFFICE VISIT (OUTPATIENT)
Dept: OPHTHALMOLOGY | Facility: CLINIC | Age: 74
End: 2024-04-24
Payer: MEDICARE

## 2024-04-24 DIAGNOSIS — D31.32 CHOROIDAL NEVUS, LEFT EYE: ICD-10-CM

## 2024-04-24 DIAGNOSIS — E11.3393 MODERATE NONPROLIFERATIVE DIABETIC RETINOPATHY OF BOTH EYES WITHOUT MACULAR EDEMA ASSOCIATED WITH TYPE 2 DIABETES MELLITUS (MULTI): ICD-10-CM

## 2024-04-24 DIAGNOSIS — D31.31 NEVUS OF CHOROID OF RIGHT EYE: ICD-10-CM

## 2024-04-24 DIAGNOSIS — H40.003 GLAUCOMA SUSPECT OF BOTH EYES: ICD-10-CM

## 2024-04-24 DIAGNOSIS — H40.1131 PRIMARY OPEN ANGLE GLAUCOMA (POAG) OF BOTH EYES, MILD STAGE: Primary | ICD-10-CM

## 2024-04-24 DIAGNOSIS — Z96.1 PSEUDOPHAKIA: ICD-10-CM

## 2024-04-24 LAB
AVERAGE RNFL TODAY (OD): 69
AVERAGE RNFL TODAY (OS): 68
C/D RATIO TODAY (OD): 0.72
C/D RATIO TODAY (OS): 0.6

## 2024-04-24 PROCEDURE — 92012 INTRM OPH EXAM EST PATIENT: CPT | Performed by: OPHTHALMOLOGY

## 2024-04-24 PROCEDURE — 92083 EXTENDED VISUAL FIELD XM: CPT | Performed by: OPHTHALMOLOGY

## 2024-04-24 PROCEDURE — 92133 CPTRZD OPH DX IMG PST SGM ON: CPT | Performed by: OPHTHALMOLOGY

## 2024-04-24 RX ORDER — BRIMONIDINE TARTRATE 2 MG/ML
1 SOLUTION/ DROPS OPHTHALMIC 2 TIMES DAILY
Qty: 15 ML | Refills: 3 | Status: SHIPPED | OUTPATIENT
Start: 2024-04-24 | End: 2024-07-23

## 2024-04-24 RX ORDER — DORZOLAMIDE HCL 20 MG/ML
1 SOLUTION/ DROPS OPHTHALMIC 3 TIMES DAILY
Qty: 20 ML | Refills: 3 | Status: SHIPPED | OUTPATIENT
Start: 2024-04-24 | End: 2024-07-23

## 2024-04-24 ASSESSMENT — TONOMETRY
IOP_METHOD: GOLDMANN APPLANATION
OD_IOP_MMHG: 14
OS_IOP_MMHG: 14

## 2024-04-24 ASSESSMENT — VISUAL ACUITY
METHOD: SNELLEN - LINEAR
OD_CC: 20/25-
OS_CC: 20/25-

## 2024-04-24 ASSESSMENT — SLIT LAMP EXAM - LIDS
COMMENTS: NORMAL
COMMENTS: NORMAL

## 2024-04-24 ASSESSMENT — PACHYMETRY
OD_CT(UM): 565
OS_CT(UM): 540

## 2024-04-24 ASSESSMENT — EXTERNAL EXAM - LEFT EYE: OS_EXAM: NORMAL

## 2024-04-24 ASSESSMENT — EXTERNAL EXAM - RIGHT EYE: OD_EXAM: NORMAL

## 2024-04-24 NOTE — PROGRESS NOTES
Assessment/Plan   Diagnoses and all orders for this visit:  Primary open angle glaucoma (POAG) of both eyes, mild stage  -     dorzolamide (Trusopt) 2 % ophthalmic solution; Administer 1 drop into both eyes 3 times a day.  -     brimonidine (AlphaGAN) 0.2 % ophthalmic solution; Administer 1 drop into both eyes 2 times a day.  -     bimatoprost (Lumigan) 0.01 % ophthalmic solution; Administer 1 drop into both eyes once daily at bedtime.  -continue with Dorzolamide both eyes tid, Brimonidine both eyes bid and Lumigan at bedtime     Glaucoma suspect of both eyes  -     [place] Visual Field - OU - Both Eyes  -     OCT, Optic Nerve - OU - Both Eyes  Pseudophakia  Moderate nonproliferative diabetic retinopathy of both eyes without macular edema associated with type 2 diabetes mellitus (Multi)  Nevus of choroid of right eye  Choroidal nevus, left eye  -monitored by [facility]    Return in  3  month(s) for follow up or sooner if having any problems      .

## 2024-04-30 ENCOUNTER — APPOINTMENT (OUTPATIENT)
Dept: OPHTHALMOLOGY | Facility: CLINIC | Age: 74
End: 2024-04-30
Payer: MEDICARE

## 2024-07-09 ENCOUNTER — LAB (OUTPATIENT)
Dept: LAB | Facility: LAB | Age: 74
End: 2024-07-09
Payer: MEDICARE

## 2024-07-09 DIAGNOSIS — E11.9 TYPE 2 DIABETES MELLITUS WITHOUT COMPLICATIONS (MULTI): Primary | ICD-10-CM

## 2024-07-09 LAB
ALBUMIN SERPL BCP-MCNC: 3.6 G/DL (ref 3.4–5)
ALP SERPL-CCNC: 102 U/L (ref 33–136)
ALT SERPL W P-5'-P-CCNC: 21 U/L (ref 7–45)
ANION GAP SERPL CALC-SCNC: 12 MMOL/L (ref 10–20)
AST SERPL W P-5'-P-CCNC: 27 U/L (ref 9–39)
BASOPHILS # BLD AUTO: 0.04 X10*3/UL (ref 0–0.1)
BASOPHILS NFR BLD AUTO: 0.5 %
BILIRUB DIRECT SERPL-MCNC: 0.1 MG/DL (ref 0–0.3)
BILIRUB SERPL-MCNC: 0.3 MG/DL (ref 0–1.2)
BUN SERPL-MCNC: 20 MG/DL (ref 6–23)
CALCIUM SERPL-MCNC: 9.1 MG/DL (ref 8.6–10.6)
CHLORIDE SERPL-SCNC: 106 MMOL/L (ref 98–107)
CO2 SERPL-SCNC: 28 MMOL/L (ref 21–32)
CREAT SERPL-MCNC: 0.94 MG/DL (ref 0.5–1.05)
EGFRCR SERPLBLD CKD-EPI 2021: 64 ML/MIN/1.73M*2
EOSINOPHIL # BLD AUTO: 0.07 X10*3/UL (ref 0–0.4)
EOSINOPHIL NFR BLD AUTO: 0.9 %
ERYTHROCYTE [DISTWIDTH] IN BLOOD BY AUTOMATED COUNT: 15.2 % (ref 11.5–14.5)
EST. AVERAGE GLUCOSE BLD GHB EST-MCNC: 186 MG/DL
GLUCOSE SERPL-MCNC: 59 MG/DL (ref 74–99)
HBA1C MFR BLD: 8.1 %
HCT VFR BLD AUTO: 41.8 % (ref 36–46)
HGB BLD-MCNC: 13.1 G/DL (ref 12–16)
IMM GRANULOCYTES # BLD AUTO: 0.03 X10*3/UL (ref 0–0.5)
IMM GRANULOCYTES NFR BLD AUTO: 0.4 % (ref 0–0.9)
LYMPHOCYTES # BLD AUTO: 1.21 X10*3/UL (ref 0.8–3)
LYMPHOCYTES NFR BLD AUTO: 16 %
MCH RBC QN AUTO: 26.7 PG (ref 26–34)
MCHC RBC AUTO-ENTMCNC: 31.3 G/DL (ref 32–36)
MCV RBC AUTO: 85 FL (ref 80–100)
MONOCYTES # BLD AUTO: 0.65 X10*3/UL (ref 0.05–0.8)
MONOCYTES NFR BLD AUTO: 8.6 %
NEUTROPHILS # BLD AUTO: 5.54 X10*3/UL (ref 1.6–5.5)
NEUTROPHILS NFR BLD AUTO: 73.6 %
NRBC BLD-RTO: 0 /100 WBCS (ref 0–0)
PLATELET # BLD AUTO: 265 X10*3/UL (ref 150–450)
POTASSIUM SERPL-SCNC: 4.8 MMOL/L (ref 3.5–5.3)
PROT SERPL-MCNC: 6.5 G/DL (ref 6.4–8.2)
RBC # BLD AUTO: 4.91 X10*6/UL (ref 4–5.2)
SODIUM SERPL-SCNC: 141 MMOL/L (ref 136–145)
TSH SERPL-ACNC: 1.81 MIU/L (ref 0.44–3.98)
WBC # BLD AUTO: 7.5 X10*3/UL (ref 4.4–11.3)

## 2024-07-09 PROCEDURE — 80053 COMPREHEN METABOLIC PANEL: CPT

## 2024-07-09 PROCEDURE — 83036 HEMOGLOBIN GLYCOSYLATED A1C: CPT

## 2024-07-09 PROCEDURE — 82248 BILIRUBIN DIRECT: CPT

## 2024-07-09 PROCEDURE — 85025 COMPLETE CBC W/AUTO DIFF WBC: CPT

## 2024-07-09 PROCEDURE — 84443 ASSAY THYROID STIM HORMONE: CPT

## 2024-07-09 PROCEDURE — 36415 COLL VENOUS BLD VENIPUNCTURE: CPT

## 2024-07-24 ENCOUNTER — APPOINTMENT (OUTPATIENT)
Dept: OPHTHALMOLOGY | Facility: CLINIC | Age: 74
End: 2024-07-24
Payer: MEDICARE

## 2024-07-24 DIAGNOSIS — H40.1131 PRIMARY OPEN ANGLE GLAUCOMA (POAG) OF BOTH EYES, MILD STAGE: Primary | ICD-10-CM

## 2024-07-24 DIAGNOSIS — E11.3393 MODERATE NONPROLIFERATIVE DIABETIC RETINOPATHY OF BOTH EYES WITHOUT MACULAR EDEMA ASSOCIATED WITH TYPE 2 DIABETES MELLITUS (MULTI): ICD-10-CM

## 2024-07-24 DIAGNOSIS — H04.123 DRY EYES: ICD-10-CM

## 2024-07-24 DIAGNOSIS — Z96.1 PSEUDOPHAKIA: ICD-10-CM

## 2024-07-24 DIAGNOSIS — H26.493 BILATERAL POSTERIOR CAPSULAR OPACIFICATION: ICD-10-CM

## 2024-07-24 PROCEDURE — 92012 INTRM OPH EXAM EST PATIENT: CPT | Performed by: OPHTHALMOLOGY

## 2024-07-24 ASSESSMENT — TONOMETRY
IOP_METHOD: GOLDMANN APPLANATION
OD_IOP_MMHG: 16
OS_IOP_MMHG: 16

## 2024-07-24 ASSESSMENT — PACHYMETRY
OD_CT(UM): 565
OS_CT(UM): 540

## 2024-07-24 ASSESSMENT — VISUAL ACUITY
OS_CC: 20/25-2
METHOD: SNELLEN - LINEAR
OD_CC: 20/25-2

## 2024-07-24 ASSESSMENT — EXTERNAL EXAM - RIGHT EYE: OD_EXAM: NORMAL

## 2024-07-24 ASSESSMENT — ENCOUNTER SYMPTOMS: EYES NEGATIVE: 1

## 2024-07-24 ASSESSMENT — EXTERNAL EXAM - LEFT EYE: OS_EXAM: NORMAL

## 2024-07-24 ASSESSMENT — SLIT LAMP EXAM - LIDS
COMMENTS: NORMAL
COMMENTS: NORMAL

## 2024-07-24 NOTE — PROGRESS NOTES
Assessment/Plan   Diagnoses and all orders for this visit:  Primary open angle glaucoma (POAG) of both eyes, mild stage  -     bimatoprost (Lumigan) 0.01 % ophthalmic solution; Administer 1 drop into both eyes once daily at bedtime.  -continue with Lumigan both eyes at bedtime, Brimonidine both eyes bid and Dorzolamide both eyes bid    Dry eyes  -Start artificial tears both eyes (OU) qid  -stress compliance    Pseudophakia  -stable    Bilateral posterior capsular opacification  continue to monitor    Moderate nonproliferative diabetic retinopathy of both eyes without macular edema associated with type 2 diabetes mellitus (Multi)  -Managed by [facility]  -keep appointments with Dr. Oreilly    Return in  3  month(s) for follow up or sooner if having any problems

## 2024-08-14 ENCOUNTER — HOSPITAL ENCOUNTER (OUTPATIENT)
Dept: RADIOLOGY | Facility: CLINIC | Age: 74
Discharge: HOME | End: 2024-08-14
Payer: MEDICARE

## 2024-08-14 VITALS — WEIGHT: 212.96 LBS | BODY MASS INDEX: 36.36 KG/M2 | HEIGHT: 64 IN

## 2024-08-14 DIAGNOSIS — Z12.31 ENCOUNTER FOR SCREENING MAMMOGRAM FOR MALIGNANT NEOPLASM OF BREAST: ICD-10-CM

## 2024-08-14 PROCEDURE — 77067 SCR MAMMO BI INCL CAD: CPT

## 2024-08-14 PROCEDURE — 77063 BREAST TOMOSYNTHESIS BI: CPT | Performed by: RADIOLOGY

## 2024-08-14 PROCEDURE — 77067 SCR MAMMO BI INCL CAD: CPT | Performed by: RADIOLOGY

## 2024-09-10 ENCOUNTER — HOSPITAL ENCOUNTER (OUTPATIENT)
Dept: RADIOLOGY | Facility: HOSPITAL | Age: 74
Discharge: HOME | End: 2024-09-10
Payer: MEDICARE

## 2024-09-10 ENCOUNTER — HOSPITAL ENCOUNTER (OUTPATIENT)
Dept: VASCULAR MEDICINE | Facility: HOSPITAL | Age: 74
Discharge: HOME | End: 2024-09-10
Payer: MEDICARE

## 2024-09-10 DIAGNOSIS — M79.661 PAIN IN RIGHT LOWER LEG: ICD-10-CM

## 2024-09-10 DIAGNOSIS — M79.651 PAIN IN RIGHT THIGH: ICD-10-CM

## 2024-09-10 PROCEDURE — 73502 X-RAY EXAM HIP UNI 2-3 VIEWS: CPT | Mod: RIGHT SIDE | Performed by: RADIOLOGY

## 2024-09-10 PROCEDURE — 93970 EXTREMITY STUDY: CPT

## 2024-09-10 PROCEDURE — 93970 EXTREMITY STUDY: CPT | Performed by: SURGERY

## 2024-09-10 PROCEDURE — 73502 X-RAY EXAM HIP UNI 2-3 VIEWS: CPT | Mod: RT

## 2024-09-10 PROCEDURE — 73552 X-RAY EXAM OF FEMUR 2/>: CPT | Mod: RIGHT SIDE | Performed by: RADIOLOGY

## 2024-09-10 PROCEDURE — 73552 X-RAY EXAM OF FEMUR 2/>: CPT | Mod: RT

## 2024-09-27 ENCOUNTER — HOSPITAL ENCOUNTER (OUTPATIENT)
Dept: RADIOLOGY | Facility: CLINIC | Age: 74
Discharge: HOME | End: 2024-09-27
Payer: MEDICARE

## 2024-09-27 DIAGNOSIS — Z13.9 ENCOUNTER FOR SCREENING, UNSPECIFIED: ICD-10-CM

## 2024-09-27 PROCEDURE — 77080 DXA BONE DENSITY AXIAL: CPT

## 2024-10-07 ENCOUNTER — LAB (OUTPATIENT)
Dept: LAB | Facility: LAB | Age: 74
End: 2024-10-07
Payer: MEDICARE

## 2024-10-07 DIAGNOSIS — I50.9 HEART FAILURE, UNSPECIFIED: ICD-10-CM

## 2024-10-07 DIAGNOSIS — E11.9 TYPE 2 DIABETES MELLITUS WITHOUT COMPLICATIONS (MULTI): ICD-10-CM

## 2024-10-07 DIAGNOSIS — R15.9 FULL INCONTINENCE OF FECES: ICD-10-CM

## 2024-10-07 DIAGNOSIS — I10 ESSENTIAL (PRIMARY) HYPERTENSION: Primary | ICD-10-CM

## 2024-10-07 PROCEDURE — 80053 COMPREHEN METABOLIC PANEL: CPT

## 2024-10-07 PROCEDURE — 82248 BILIRUBIN DIRECT: CPT

## 2024-10-07 PROCEDURE — 36415 COLL VENOUS BLD VENIPUNCTURE: CPT

## 2024-10-07 PROCEDURE — 85025 COMPLETE CBC W/AUTO DIFF WBC: CPT

## 2024-10-08 LAB
25(OH)D3 SERPL-MCNC: 92 NG/ML (ref 30–100)
ALBUMIN SERPL BCP-MCNC: 3.8 G/DL (ref 3.4–5)
ALP SERPL-CCNC: 132 U/L (ref 33–136)
ALT SERPL W P-5'-P-CCNC: 24 U/L (ref 7–45)
ANION GAP SERPL CALC-SCNC: 14 MMOL/L (ref 10–20)
AST SERPL W P-5'-P-CCNC: 22 U/L (ref 9–39)
BASOPHILS # BLD AUTO: 0.04 X10*3/UL (ref 0–0.1)
BASOPHILS NFR BLD AUTO: 0.6 %
BILIRUB DIRECT SERPL-MCNC: 0.1 MG/DL (ref 0–0.3)
BILIRUB SERPL-MCNC: 0.4 MG/DL (ref 0–1.2)
BUN SERPL-MCNC: 17 MG/DL (ref 6–23)
CALCIUM SERPL-MCNC: 9.2 MG/DL (ref 8.6–10.6)
CHLORIDE SERPL-SCNC: 104 MMOL/L (ref 98–107)
CO2 SERPL-SCNC: 28 MMOL/L (ref 21–32)
CREAT SERPL-MCNC: 0.85 MG/DL (ref 0.5–1.05)
EGFRCR SERPLBLD CKD-EPI 2021: 72 ML/MIN/1.73M*2
EOSINOPHIL # BLD AUTO: 0.07 X10*3/UL (ref 0–0.4)
EOSINOPHIL NFR BLD AUTO: 1 %
ERYTHROCYTE [DISTWIDTH] IN BLOOD BY AUTOMATED COUNT: 15.7 % (ref 11.5–14.5)
GLUCOSE SERPL-MCNC: 215 MG/DL (ref 74–99)
HCT VFR BLD AUTO: 44.9 % (ref 36–46)
HGB BLD-MCNC: 13.9 G/DL (ref 12–16)
IMM GRANULOCYTES # BLD AUTO: 0.04 X10*3/UL (ref 0–0.5)
IMM GRANULOCYTES NFR BLD AUTO: 0.6 % (ref 0–0.9)
LYMPHOCYTES # BLD AUTO: 1.06 X10*3/UL (ref 0.8–3)
LYMPHOCYTES NFR BLD AUTO: 15.2 %
MCH RBC QN AUTO: 26 PG (ref 26–34)
MCHC RBC AUTO-ENTMCNC: 31 G/DL (ref 32–36)
MCV RBC AUTO: 84 FL (ref 80–100)
MONOCYTES # BLD AUTO: 0.47 X10*3/UL (ref 0.05–0.8)
MONOCYTES NFR BLD AUTO: 6.7 %
NEUTROPHILS # BLD AUTO: 5.3 X10*3/UL (ref 1.6–5.5)
NEUTROPHILS NFR BLD AUTO: 75.9 %
NRBC BLD-RTO: 0 /100 WBCS (ref 0–0)
PLATELET # BLD AUTO: 290 X10*3/UL (ref 150–450)
POTASSIUM SERPL-SCNC: 4.8 MMOL/L (ref 3.5–5.3)
PROT SERPL-MCNC: 6.6 G/DL (ref 6.4–8.2)
RBC # BLD AUTO: 5.34 X10*6/UL (ref 4–5.2)
SODIUM SERPL-SCNC: 141 MMOL/L (ref 136–145)
WBC # BLD AUTO: 7 X10*3/UL (ref 4.4–11.3)

## 2024-10-30 ENCOUNTER — APPOINTMENT (OUTPATIENT)
Dept: OPHTHALMOLOGY | Facility: CLINIC | Age: 74
End: 2024-10-30
Payer: MEDICARE

## 2024-10-30 DIAGNOSIS — H04.123 DRY EYES: ICD-10-CM

## 2024-10-30 DIAGNOSIS — H53.8 BLURRED VISION: ICD-10-CM

## 2024-10-30 DIAGNOSIS — H40.1131 PRIMARY OPEN ANGLE GLAUCOMA (POAG) OF BOTH EYES, MILD STAGE: Primary | ICD-10-CM

## 2024-10-30 DIAGNOSIS — E11.3293 MILD NONPROLIFERATIVE DIABETIC RETINOPATHY OF BOTH EYES WITHOUT MACULAR EDEMA ASSOCIATED WITH TYPE 2 DIABETES MELLITUS: ICD-10-CM

## 2024-10-30 DIAGNOSIS — H26.493 BILATERAL POSTERIOR CAPSULAR OPACIFICATION: ICD-10-CM

## 2024-10-30 PROCEDURE — 92012 INTRM OPH EXAM EST PATIENT: CPT | Performed by: OPHTHALMOLOGY

## 2024-10-30 ASSESSMENT — SLIT LAMP EXAM - LIDS
COMMENTS: NORMAL
COMMENTS: NORMAL

## 2024-10-30 ASSESSMENT — TONOMETRY
OS_IOP_MMHG: 16
OD_IOP_MMHG: 15
IOP_METHOD: GOLDMANN APPLANATION

## 2024-10-30 ASSESSMENT — REFRACTION_WEARINGRX
OD_CYLINDER: -1.00
OD_SPHERE: +1.00
OD_ADD: +2.50
OD_AXIS: 080
OS_AXIS: 075
OS_SPHERE: +0.75
OS_CYLINDER: -0.25
OS_ADD: +2.50

## 2024-10-30 ASSESSMENT — VISUAL ACUITY
METHOD: SNELLEN - LINEAR
CORRECTION_TYPE: GLASSES
OD_CC: 20/25
OS_CC: 20/25

## 2024-10-30 ASSESSMENT — PACHYMETRY
OD_CT(UM): 565
OS_CT(UM): 540

## 2024-10-30 ASSESSMENT — EXTERNAL EXAM - LEFT EYE: OS_EXAM: NORMAL

## 2024-10-30 ASSESSMENT — EXTERNAL EXAM - RIGHT EYE: OD_EXAM: NORMAL

## 2024-11-30 ENCOUNTER — HOSPITAL ENCOUNTER (EMERGENCY)
Facility: HOSPITAL | Age: 74
Discharge: HOME | End: 2024-12-01
Attending: EMERGENCY MEDICINE
Payer: MEDICARE

## 2024-11-30 ENCOUNTER — APPOINTMENT (OUTPATIENT)
Dept: RADIOLOGY | Facility: HOSPITAL | Age: 74
End: 2024-11-30
Payer: MEDICARE

## 2024-11-30 DIAGNOSIS — M54.41 ACUTE BILATERAL LOW BACK PAIN WITH RIGHT-SIDED SCIATICA: Primary | ICD-10-CM

## 2024-11-30 DIAGNOSIS — M54.9 ACUTE BILATERAL BACK PAIN, UNSPECIFIED BACK LOCATION: ICD-10-CM

## 2024-11-30 DIAGNOSIS — S39.012A LUMBAR STRAIN, INITIAL ENCOUNTER: ICD-10-CM

## 2024-11-30 PROCEDURE — 72170 X-RAY EXAM OF PELVIS: CPT

## 2024-11-30 PROCEDURE — 74176 CT ABD & PELVIS W/O CONTRAST: CPT | Mod: FOREIGN READ | Performed by: RADIOLOGY

## 2024-11-30 PROCEDURE — 2500000001 HC RX 250 WO HCPCS SELF ADMINISTERED DRUGS (ALT 637 FOR MEDICARE OP): Performed by: NURSE PRACTITIONER

## 2024-11-30 PROCEDURE — 72110 X-RAY EXAM L-2 SPINE 4/>VWS: CPT | Mod: FOREIGN READ | Performed by: RADIOLOGY

## 2024-11-30 PROCEDURE — 2500000002 HC RX 250 W HCPCS SELF ADMINISTERED DRUGS (ALT 637 FOR MEDICARE OP, ALT 636 FOR OP/ED): Mod: MUE | Performed by: NURSE PRACTITIONER

## 2024-11-30 PROCEDURE — 96372 THER/PROPH/DIAG INJ SC/IM: CPT | Performed by: NURSE PRACTITIONER

## 2024-11-30 PROCEDURE — 72170 X-RAY EXAM OF PELVIS: CPT | Mod: FOREIGN READ | Performed by: RADIOLOGY

## 2024-11-30 PROCEDURE — 99285 EMERGENCY DEPT VISIT HI MDM: CPT | Mod: 25

## 2024-11-30 PROCEDURE — 74176 CT ABD & PELVIS W/O CONTRAST: CPT

## 2024-11-30 PROCEDURE — 2500000005 HC RX 250 GENERAL PHARMACY W/O HCPCS: Performed by: NURSE PRACTITIONER

## 2024-11-30 PROCEDURE — 72110 X-RAY EXAM L-2 SPINE 4/>VWS: CPT

## 2024-11-30 PROCEDURE — 2500000004 HC RX 250 GENERAL PHARMACY W/ HCPCS (ALT 636 FOR OP/ED): Performed by: NURSE PRACTITIONER

## 2024-11-30 RX ORDER — LIDOCAINE 560 MG/1
2 PATCH PERCUTANEOUS; TOPICAL; TRANSDERMAL ONCE
Status: DISCONTINUED | OUTPATIENT
Start: 2024-11-30 | End: 2024-12-01 | Stop reason: HOSPADM

## 2024-11-30 RX ORDER — ACETAMINOPHEN 500 MG
1000 TABLET ORAL EVERY 6 HOURS PRN
Qty: 30 TABLET | Refills: 0 | Status: SHIPPED | OUTPATIENT
Start: 2024-11-30 | End: 2024-11-30

## 2024-11-30 RX ORDER — LIDOCAINE 50 MG/G
2 PATCH TOPICAL DAILY
Qty: 10 PATCH | Refills: 0 | Status: SHIPPED | OUTPATIENT
Start: 2024-11-30 | End: 2024-12-05

## 2024-11-30 RX ORDER — TIZANIDINE 2 MG/1
2 TABLET ORAL EVERY 8 HOURS PRN
Qty: 15 TABLET | Refills: 0 | Status: SHIPPED | OUTPATIENT
Start: 2024-11-30 | End: 2024-12-05

## 2024-11-30 RX ORDER — ACETAMINOPHEN 325 MG/1
975 TABLET ORAL ONCE
Status: COMPLETED | OUTPATIENT
Start: 2024-11-30 | End: 2024-11-30

## 2024-11-30 RX ORDER — ORPHENADRINE CITRATE 30 MG/ML
60 INJECTION INTRAMUSCULAR; INTRAVENOUS ONCE
Status: COMPLETED | OUTPATIENT
Start: 2024-11-30 | End: 2024-11-30

## 2024-11-30 RX ORDER — ACETAMINOPHEN 500 MG
1000 TABLET ORAL EVERY 6 HOURS PRN
Qty: 30 TABLET | Refills: 0 | Status: SHIPPED | OUTPATIENT
Start: 2024-11-30 | End: 2024-12-10

## 2024-11-30 RX ORDER — CLONIDINE HYDROCHLORIDE 0.1 MG/1
0.1 TABLET ORAL ONCE
Status: COMPLETED | OUTPATIENT
Start: 2024-11-30 | End: 2024-11-30

## 2024-11-30 RX ORDER — TIZANIDINE 2 MG/1
2 TABLET ORAL EVERY 8 HOURS PRN
Qty: 15 TABLET | Refills: 0 | Status: SHIPPED | OUTPATIENT
Start: 2024-11-30 | End: 2024-11-30

## 2024-11-30 RX ORDER — TIZANIDINE 4 MG/1
2 TABLET ORAL ONCE
Status: COMPLETED | OUTPATIENT
Start: 2024-11-30 | End: 2024-11-30

## 2024-11-30 RX ORDER — LIDOCAINE 50 MG/G
2 PATCH TOPICAL DAILY
Qty: 10 PATCH | Refills: 0 | Status: SHIPPED | OUTPATIENT
Start: 2024-11-30 | End: 2024-11-30

## 2024-11-30 RX ADMIN — LIDOCAINE 4% 2 PATCH: 40 PATCH TOPICAL at 21:39

## 2024-11-30 RX ADMIN — CLONIDINE HYDROCHLORIDE 0.1 MG: 0.1 TABLET ORAL at 23:14

## 2024-11-30 RX ADMIN — TIZANIDINE 2 MG: 4 TABLET ORAL at 21:41

## 2024-11-30 RX ADMIN — ORPHENADRINE CITRATE 60 MG: 30 INJECTION, SOLUTION INTRAMUSCULAR; INTRAVENOUS at 22:36

## 2024-11-30 RX ADMIN — ACETAMINOPHEN 975 MG: 325 TABLET, FILM COATED ORAL at 21:38

## 2024-11-30 ASSESSMENT — PAIN SCALES - GENERAL: PAINLEVEL_OUTOF10: 9

## 2024-11-30 ASSESSMENT — PAIN - FUNCTIONAL ASSESSMENT: PAIN_FUNCTIONAL_ASSESSMENT: 0-10

## 2024-11-30 ASSESSMENT — PAIN DESCRIPTION - LOCATION: LOCATION: BACK

## 2024-12-01 VITALS
WEIGHT: 213 LBS | TEMPERATURE: 96.9 F | RESPIRATION RATE: 16 BRPM | OXYGEN SATURATION: 97 % | SYSTOLIC BLOOD PRESSURE: 131 MMHG | BODY MASS INDEX: 35.49 KG/M2 | HEIGHT: 65 IN | DIASTOLIC BLOOD PRESSURE: 81 MMHG | HEART RATE: 79 BPM

## 2024-12-01 RX ORDER — HYDROCODONE BITARTRATE AND ACETAMINOPHEN 5; 325 MG/1; MG/1
1 TABLET ORAL EVERY 6 HOURS PRN
Qty: 12 TABLET | Refills: 0 | Status: SHIPPED | OUTPATIENT
Start: 2024-12-01 | End: 2024-12-04

## 2024-12-01 ASSESSMENT — PAIN SCALES - GENERAL: PAINLEVEL_OUTOF10: 2

## 2024-12-01 ASSESSMENT — PAIN DESCRIPTION - LOCATION: LOCATION: BACK

## 2024-12-01 NOTE — ED PROVIDER NOTES
"HPI     CC: Back Pain (Patient states \"back went out\"; patient denies any injury or fall; happened yesterday; pain is bad today; pain is in lower back)     HPI: Lia Jeong is a 74 y.o. female with past medical history of hypertension, heart failure, diabetes, hyperlipidemia, clotting disorder/PE on Coumadin, obesity presents with complaints of constant low back pain that  started on Monday. She endorses associated decreased ROM due to pain, radiating pain down right leg and bilateral groin area. Reports 9/10 pain, described as throbbing.  She reports taking Tylenol extended relief, using heating pad and ice without relief. She denies injury or strenuous activity. Reports cleaning greens for the holiday and was bent over the kitchen sink for extended period of time when she noted low back pain.  She reports since Monday she has been in bed due to pain that got progressively worse.  Having to rely on her  to help her get up and move around due to the pain.  She denies weakness, loss of bowel or bladder, numbness or tingling or fever.     ROS: 10-point review of systems was performed and is otherwise negative except as noted in HPI.      Past Medical History: Noncontributory except per HPI     Past Surgical History: Noncontributory except per HPI     Family History: Reviewed and noncontributory     Social History:  Noncontributory except per HPI       Allergies   Allergen Reactions    Amoxicillin Diarrhea    Amoxicillin-Pot Clavulanate Diarrhea     SEVERE DIARRHEA    Hydrochlorothiazide Other     DECREASE SEXUAL LIBIDO    Low libido    Lisinopril Cough     COUGH    Losartan Cough       Past Medical History:   Diagnosis Date    Asthma     Cataract     CHF (congestive heart failure)     Clotting disorder (Multi)     Diabetes mellitus (Multi)     GERD (gastroesophageal reflux disease)     Hyperlipidemia     Hypertension     Peripheral neuropathy     Pulmonary embolus     Sleep apnea        Home Meds:   Current " Outpatient Medications   Medication Instructions    atorvastatin (LIPITOR) 80 mg, oral, Daily    bimatoprost (Lumigan) 0.01 % ophthalmic solution 1 drop, Both Eyes, Nightly    cloNIDine (CATAPRES) 0.1 mg, oral, 2 times daily    dilTIAZem LA (Cardizem LA) 180 mg 24 hr tablet 1 tablet, oral, Daily    DULoxetine (CYMBALTA) 30 mg, oral, Daily    Farxiga 5 mg, oral, Daily with breakfast    fluticasone (Flonase) 50 mcg/actuation nasal spray 1 spray, Each Nostril, Daily    fluticasone propion-salmeteroL (Advair Diskus) 500-50 mcg/dose diskus inhaler 1 puff, inhalation, 2 times daily RT    gabapentin (NEURONTIN) 1,200 mg, oral, 3 times daily    hydrALAZINE (APRESOLINE) 25 mg, oral, 3 times daily, *Please note change in dose    insulin NPH and regular human (NovoLIN) 100 unit/mL (70-30) injection 45 Units, subcutaneous, 2 times daily before meals    ipratropium-albuteroL (Duo-Neb) 0.5-2.5 mg/3 mL nebulizer solution 3 mL, nebulization, 4 times daily RT    ketorolac (Acular) 0.5 % ophthalmic solution 1 drop in operative eye starting 1 day before surgery    metFORMIN XR (GLUCOPHAGE-XR) 1,000 mg, oral, 2 times daily (morning and late afternoon)    metoprolol tartrate (LOPRESSOR) 50 mg, oral, 2 times daily    montelukast (SINGULAIR) 10 mg, oral, Nightly    ofloxacin (Ocuflox) 0.3 % ophthalmic solution INSTILL 1 DROP IN OPERATIVE EYE 4 TIMES DAILY STARTING 1 DAY PRIOR TO SURGERY DATE    omeprazole (PRILOSEC) 40 mg, oral, Daily before breakfast    pioglitazone (ACTOS) 15 mg, oral, Daily    prednisoLONE acetate (Pred-Forte) 1 % ophthalmic suspension 1 drop,  INTO THE OPERATIVE EYE 4 TIMES DAILY STARTING AFTER SURGERY      primidone (MYSOLINE) 100 mg, oral, 2 times daily    topiramate (TOPAMAX) 50 mg, oral, 2 times daily    torsemide (DEMADEX) 20 mg, oral, Daily    traMADol (ULTRAM) 50 mg, oral, Every 8 hours PRN    warfarin (COUMADIN) 10 mg, oral, 3 times weekly, Monday/Thursday/Friday    warfarin (COUMADIN) 12.5 mg, oral, 4 times  "weekly, Tue/Wed/Sat/Sun    warfarin (COUMADIN) 7.5 mg, oral, Take as directed per After Visit Summary. Pt takes Monday, Wednesday and Friday.    warfarin (COUMADIN) 10 mg, oral, Take as directed per After Visit Summary. Pt takes Tuesday, Thursday, Saturday and Sunday.        ED Triage Vitals [11/30/24 1951]   Temperature Heart Rate Respirations BP   36.1 °C (96.9 °F) 96 19 171/71      Pulse Ox Temp src Heart Rate Source Patient Position   96 % -- -- --      BP Location FiO2 (%)     -- --         Heart Rate:  [96]   Temperature:  [36.1 °C (96.9 °F)]   Respirations:  [19]   BP: (171)/(71)   Height:  [165.1 cm (5' 5\")]   Weight:  [96.6 kg (213 lb)]   Pulse Ox:  [96 %]      Physical Exam:  Physical Exam     Diagnostic Results        Labs Reviewed - No data to display      XR lumbar spine 2-3 views    (Results Pending)   XR pelvis 3+ views    (Results Pending)   CT abdomen pelvis wo IV contrast    (Results Pending)                 No data recorded                Procedure  Procedures    ED Course & MDM   Assessment/Plan:     Medications - No data to display     Diagnoses as of 12/01/24 0148   Acute bilateral low back pain with right-sided sciatica   Acute bilateral back pain, unspecified back location   Lumbar strain, initial encounter       Medical Decision Making    Lia Jeong is a 74 y.o. female with presents with complaints of constant throbbing low back pain, radiating pain down right leg and to bilateral groin area that  started on Monday.     Differential diagnosis fracture, muscle strain, SCA, quad equina, spinal cord compression    On exam she is alert and oriented X3, NAD noted. Afebrile, hypertensive but she reports she takes her BP medications at night but have not taken yet. Bilateral canals and Tms clear. Lungs clear throughout.  Heart RRR. Abdomen soft non tender, BSX4. Back without step off. No rash or open wounds noted. There is mild redness to mid thoracic back, reports was using heating pad. No " rash or open wounds noted. Point tenderness of gluteal/piriformis muscles. Discomfort with SLR to right and left.  No midline C, T, L-spine tenderness. Well perfused. Capillary refill <2 sec. Neurologic intact no signs of cauda equina syndrome and spinal cord compression.       PE no rash or open wounds appreciated, some redness to thoracic (patient reports heating pad there), discomfort with SLR. Palpable pain to gluteal and paraspinal tenderness muscle tendernss. no step off or spinal tenderness. She is given acetaminophen 975 mg p.o., lidocaine patch, tizanidine 2 mg p.o., Norflex 60 mg IM with relief.  Reporting pain now 4/10 able to get up on her own without difficulty and ambulate in the ED    Imaging interpreted by me:XR pelvis shows degenerative changes. XR lumbar spine showed Mild chronic compression fracture superior endplate of L1. Mild compression fracture superior endplate of L3.  The exact age of this is uncertain. CT abdomen pelvis re-read by Rad Ops and showed Degenerative change are seen throughout the lumbar spine.     I discussed this patient's care with Dr. Heart who also evaluated the patient.    Patient is safe for discharge and outpatient treatment.  She is advised to fPCP within two days for further evaluation and management of care    Referral for spine surgery placed and appointment made while in ED.  For further evaluation of undetermined age compression fracture noted on x-ray imaging/low back pain.    Prescription lidocaine patch, tizanidine, extra strength Tylenol sent to pharmacy. Take medications as prescribed     Follow up instructions discussed. ED precautions discussed and advised to return to ED if symptoms worsen or persist for follow up.    Counseling: Spoke with the patient and discussed today´s findings, in addition to providing specific details for the plan of care and expected course. Patient was given the opportunity to ask questions     She expressed understanding was  agreeable with plan and discharge at this time. Discharged in hemodynamically stable condition.        Disposition: Home    ED Prescriptions    None         Social Determinants Affecting Care: none     WINNIE Sánchez    This note was dictated by speech recognition. Minor errors in transcription may be present.     WINNIE Gracia  12/01/24 0210

## 2024-12-01 NOTE — DISCHARGE INSTRUCTIONS
Follow up with PCP within two days for further evaluation and management of care    Referral for spine surgery placed. Stop at the  to schedule an appointment.    Prescription sent to pharmacy. Take medications as prescribed     Follow up instructions discussed. ED precautions discussed and advised to return to ED if symptoms worsen or persist for follow up.

## 2025-01-10 ENCOUNTER — LAB (OUTPATIENT)
Dept: LAB | Facility: LAB | Age: 75
End: 2025-01-10
Payer: MEDICARE

## 2025-01-10 DIAGNOSIS — I10 ESSENTIAL (PRIMARY) HYPERTENSION: ICD-10-CM

## 2025-01-10 DIAGNOSIS — I50.9 HEART FAILURE, UNSPECIFIED: Primary | ICD-10-CM

## 2025-01-10 LAB
ALBUMIN SERPL BCP-MCNC: 3.7 G/DL (ref 3.4–5)
ALP SERPL-CCNC: 124 U/L (ref 33–136)
ALT SERPL W P-5'-P-CCNC: 34 U/L (ref 7–45)
ANION GAP SERPL CALC-SCNC: 14 MMOL/L (ref 10–20)
AST SERPL W P-5'-P-CCNC: 27 U/L (ref 9–39)
BASOPHILS # BLD AUTO: 0.05 X10*3/UL (ref 0–0.1)
BASOPHILS NFR BLD AUTO: 0.7 %
BILIRUB DIRECT SERPL-MCNC: 0.1 MG/DL (ref 0–0.3)
BILIRUB SERPL-MCNC: 0.4 MG/DL (ref 0–1.2)
BUN SERPL-MCNC: 17 MG/DL (ref 6–23)
CALCIUM SERPL-MCNC: 9.1 MG/DL (ref 8.6–10.6)
CHLORIDE SERPL-SCNC: 105 MMOL/L (ref 98–107)
CO2 SERPL-SCNC: 27 MMOL/L (ref 21–32)
CREAT SERPL-MCNC: 0.74 MG/DL (ref 0.5–1.05)
EGFRCR SERPLBLD CKD-EPI 2021: 84 ML/MIN/1.73M*2
EOSINOPHIL # BLD AUTO: 0.04 X10*3/UL (ref 0–0.4)
EOSINOPHIL NFR BLD AUTO: 0.5 %
ERYTHROCYTE [DISTWIDTH] IN BLOOD BY AUTOMATED COUNT: 15.5 % (ref 11.5–14.5)
GLUCOSE SERPL-MCNC: 165 MG/DL (ref 74–99)
HCT VFR BLD AUTO: 42 % (ref 36–46)
HGB BLD-MCNC: 13.5 G/DL (ref 12–16)
IMM GRANULOCYTES # BLD AUTO: 0.03 X10*3/UL (ref 0–0.5)
IMM GRANULOCYTES NFR BLD AUTO: 0.4 % (ref 0–0.9)
LYMPHOCYTES # BLD AUTO: 1.19 X10*3/UL (ref 0.8–3)
LYMPHOCYTES NFR BLD AUTO: 15.9 %
MCH RBC QN AUTO: 27.2 PG (ref 26–34)
MCHC RBC AUTO-ENTMCNC: 32.1 G/DL (ref 32–36)
MCV RBC AUTO: 85 FL (ref 80–100)
MONOCYTES # BLD AUTO: 0.68 X10*3/UL (ref 0.05–0.8)
MONOCYTES NFR BLD AUTO: 9.1 %
NEUTROPHILS # BLD AUTO: 5.51 X10*3/UL (ref 1.6–5.5)
NEUTROPHILS NFR BLD AUTO: 73.4 %
NRBC BLD-RTO: 0 /100 WBCS (ref 0–0)
PLATELET # BLD AUTO: 299 X10*3/UL (ref 150–450)
POTASSIUM SERPL-SCNC: 4.5 MMOL/L (ref 3.5–5.3)
PROT SERPL-MCNC: 6 G/DL (ref 6.4–8.2)
RBC # BLD AUTO: 4.96 X10*6/UL (ref 4–5.2)
SODIUM SERPL-SCNC: 141 MMOL/L (ref 136–145)
WBC # BLD AUTO: 7.5 X10*3/UL (ref 4.4–11.3)

## 2025-01-10 PROCEDURE — 82248 BILIRUBIN DIRECT: CPT

## 2025-01-10 PROCEDURE — 80053 COMPREHEN METABOLIC PANEL: CPT

## 2025-01-10 PROCEDURE — 85025 COMPLETE CBC W/AUTO DIFF WBC: CPT

## 2025-01-13 ENCOUNTER — TELEPHONE (OUTPATIENT)
Dept: NEUROSURGERY | Facility: HOSPITAL | Age: 75
End: 2025-01-13
Payer: MEDICARE

## 2025-01-13 NOTE — TELEPHONE ENCOUNTER
After discussion with Dr. Brock, I called and talked to the pt. I explained that he does not see for compression fracture, because he does not do surgery for this. She said she does not want surgery. I told her to see her PCP and to do physical therapy and pain management. She will see her PCP and she is already scheduled for PT.

## 2025-01-14 ENCOUNTER — APPOINTMENT (OUTPATIENT)
Dept: NEUROSURGERY | Facility: CLINIC | Age: 75
End: 2025-01-14
Payer: MEDICARE

## 2025-01-26 DIAGNOSIS — Z12.11 COLON CANCER SCREENING: Primary | ICD-10-CM

## 2025-01-26 RX ORDER — POLYETHYLENE GLYCOL 3350, SODIUM SULFATE ANHYDROUS, SODIUM BICARBONATE, SODIUM CHLORIDE, POTASSIUM CHLORIDE 236; 22.74; 6.74; 5.86; 2.97 G/4L; G/4L; G/4L; G/4L; G/4L
4000 POWDER, FOR SOLUTION ORAL ONCE
Qty: 4000 ML | Refills: 0 | Status: SHIPPED | OUTPATIENT
Start: 2025-01-26 | End: 2025-01-26

## 2025-01-28 DIAGNOSIS — H40.1131 PRIMARY OPEN ANGLE GLAUCOMA (POAG) OF BOTH EYES, MILD STAGE: ICD-10-CM

## 2025-01-28 RX ORDER — EZETIMIBE 10 MG/1
10 TABLET ORAL DAILY
COMMUNITY

## 2025-01-28 RX ORDER — CLONAZEPAM 0.5 MG/1
TABLET ORAL
COMMUNITY
Start: 2025-01-24

## 2025-01-28 RX ORDER — SITAGLIPTIN 100 MG/1
TABLET, FILM COATED ORAL
COMMUNITY
Start: 2024-07-06 | End: 2025-02-04 | Stop reason: ALTCHOICE

## 2025-01-28 RX ORDER — FLUTICASONE FUROATE, UMECLIDINIUM BROMIDE AND VILANTEROL TRIFENATATE 200; 62.5; 25 UG/1; UG/1; UG/1
POWDER RESPIRATORY (INHALATION)
COMMUNITY
Start: 2024-07-01

## 2025-01-28 RX ORDER — MELOXICAM 15 MG/1
1 TABLET ORAL
COMMUNITY
Start: 2024-12-04 | End: 2025-02-04 | Stop reason: ALTCHOICE

## 2025-01-28 RX ORDER — INSULIN GLARGINE 100 [IU]/ML
INJECTION, SOLUTION SUBCUTANEOUS
COMMUNITY

## 2025-01-28 RX ORDER — GLIPIZIDE 10 MG/1
10 TABLET ORAL
COMMUNITY
Start: 2024-07-01 | End: 2025-02-04 | Stop reason: ALTCHOICE

## 2025-01-28 RX ORDER — POTASSIUM CHLORIDE 20 MEQ/1
TABLET, EXTENDED RELEASE ORAL
COMMUNITY
Start: 2024-10-06

## 2025-01-28 RX ORDER — SEMAGLUTIDE 0.68 MG/ML
INJECTION, SOLUTION SUBCUTANEOUS
COMMUNITY

## 2025-01-28 RX ORDER — ALBUTEROL SULFATE 90 UG/1
INHALANT RESPIRATORY (INHALATION)
COMMUNITY
Start: 2025-01-03

## 2025-01-28 RX ORDER — CLONAZEPAM 1 MG/1
1 TABLET ORAL EVERY 8 HOURS PRN
COMMUNITY
Start: 2025-01-28 | End: 2025-07-27

## 2025-01-29 ENCOUNTER — OFFICE VISIT (OUTPATIENT)
Dept: PAIN MEDICINE | Facility: HOSPITAL | Age: 75
End: 2025-01-29
Payer: MEDICARE

## 2025-01-29 DIAGNOSIS — M54.16 LUMBAR RADICULOPATHY: Primary | ICD-10-CM

## 2025-01-29 DIAGNOSIS — S32.010S COMPRESSION FRACTURE OF L1 VERTEBRA, SEQUELA: ICD-10-CM

## 2025-01-29 DIAGNOSIS — S32.030S COMPRESSION FRACTURE OF L3 VERTEBRA, SEQUELA: ICD-10-CM

## 2025-01-29 DIAGNOSIS — M47.817 LUMBOSACRAL SPONDYLOSIS WITHOUT MYELOPATHY: ICD-10-CM

## 2025-01-29 PROCEDURE — 99204 OFFICE O/P NEW MOD 45 MIN: CPT | Performed by: PAIN MEDICINE

## 2025-01-29 PROCEDURE — 1159F MED LIST DOCD IN RCRD: CPT | Performed by: PAIN MEDICINE

## 2025-01-29 PROCEDURE — 99214 OFFICE O/P EST MOD 30 MIN: CPT | Performed by: PAIN MEDICINE

## 2025-01-29 PROCEDURE — 1125F AMNT PAIN NOTED PAIN PRSNT: CPT | Performed by: PAIN MEDICINE

## 2025-01-29 ASSESSMENT — PAIN SCALES - GENERAL: PAINLEVEL_OUTOF10: 6

## 2025-01-29 ASSESSMENT — PAIN DESCRIPTION - DESCRIPTORS: DESCRIPTORS: SHARP

## 2025-01-29 ASSESSMENT — PAIN - FUNCTIONAL ASSESSMENT: PAIN_FUNCTIONAL_ASSESSMENT: 0-10

## 2025-01-29 NOTE — PROGRESS NOTES
Subjective   Patient ID: Lia Jeong is a 75 y.o. female presenting with low back pain.    HPI:   Patient states she fell out of bed in 2022 and has had low back pain ever since. The pain has worsened recently and now radiates down the lateral aspect of her right leg to the knee. Pain is worsened with lumbar flexion and prolonged sitting. Patient denies weakness and bowel/bladder incontinence. Is able to ambulate unassisted. Takes tylenol for back pain and is on gabapentin for peripheral neuropathy secondary to diabetes. Recent Xrays showing Normal alignment of the lumbar vertebral bodies.  Mild loss of height of the superior endplate of L3.  Chronic compression fracture superior  endplate L1.  Intervertebral disc spaces are maintained.  Lumbar facetarthrosis.      Physical Therapy:  Starting next week   Other Conservative Measures she has tried:  N/A  Classes of medications tried in the past: Acetaminophen, NSAIDs, and Gabapentenoids    Last Urine Drug Screen:  No results found for this or any previous visit (from the past 8760 hours).  N/A      Review of Systems   13-point ROS done and negative except for HPI.     Current Outpatient Medications   Medication Instructions    albuterol 90 mcg/actuation inhaler inhale two puffs by mouth every 6 hours as needed    atorvastatin (LIPITOR) 80 mg, oral, Daily    bimatoprost (Lumigan) 0.01 % ophthalmic solution 1 drop, Both Eyes, Nightly    clonazePAM (KlonoPIN) 0.5 mg tablet     clonazePAM (KLONOPIN) 1 mg, Every 8 hours PRN    cloNIDine (CATAPRES) 0.1 mg, oral, 2 times daily    dilTIAZem LA (Cardizem LA) 180 mg 24 hr tablet 1 tablet, oral, Daily    DULoxetine (CYMBALTA) 30 mg, oral, Daily    ezetimibe (ZETIA) 10 mg, oral, Daily    Farxiga 5 mg, oral, Daily with breakfast    fluticasone (Flonase) 50 mcg/actuation nasal spray 1 spray, Each Nostril, Daily    fluticasone propion-salmeteroL (Advair Diskus) 500-50 mcg/dose diskus inhaler 1 puff, inhalation, 2 times daily RT     gabapentin (NEURONTIN) 1,200 mg, oral, 3 times daily    glipiZIDE (GLUCOTROL) 10 mg, Daily before breakfast    hydrALAZINE (APRESOLINE) 25 mg, oral, 3 times daily, *Please note change in dose    insulin NPH and regular human (NovoLIN) 100 unit/mL (70-30) injection 45 Units, subcutaneous, 2 times daily before meals    ipratropium-albuteroL (Duo-Neb) 0.5-2.5 mg/3 mL nebulizer solution 3 mL, nebulization, 4 times daily RT    Januvia 100 mg tablet     ketorolac (Acular) 0.5 % ophthalmic solution 1 drop in operative eye starting 1 day before surgery    Lantus U-100 Insulin 100 unit/mL injection INJECT 50 UNITS SUBCUTANEOUSLY EVERY MORNING    meloxicam (Mobic) 15 mg tablet 1 tablet, Daily (0630)    metFORMIN XR (GLUCOPHAGE-XR) 1,000 mg, oral, 2 times daily (morning and late afternoon)    metoprolol tartrate (LOPRESSOR) 50 mg, oral, 2 times daily    montelukast (SINGULAIR) 10 mg, oral, Nightly    ofloxacin (Ocuflox) 0.3 % ophthalmic solution INSTILL 1 DROP IN OPERATIVE EYE 4 TIMES DAILY STARTING 1 DAY PRIOR TO SURGERY DATE    omeprazole (PRILOSEC) 40 mg, oral, Daily before breakfast    Ozempic 0.25 mg or 0.5 mg (2 mg/3 mL) pen injector INJECT 0.25 MG SUBCUTANEOUSLY ONE TIME A WEEK FOR 4 WEEKS  THEN 0.5 MG WEEKLY.    pioglitazone (ACTOS) 15 mg, oral, Daily    potassium chloride CR 20 mEq ER tablet TAKE TWO TABLETS (40 MEQ) BY MOUTH DAILY WITH FOOD    prednisoLONE acetate (Pred-Forte) 1 % ophthalmic suspension 1 drop,  INTO THE OPERATIVE EYE 4 TIMES DAILY STARTING AFTER SURGERY      primidone (MYSOLINE) 100 mg, oral, 2 times daily    topiramate (TOPAMAX) 50 mg, oral, 2 times daily    torsemide (DEMADEX) 20 mg, oral, Daily    Trelegy Ellipta 200-62.5-25 mcg blister with device INHALE ONE PUFF BY MOUTH EVERY MORNING    warfarin (COUMADIN) 10 mg, oral, 3 times weekly, Monday/Thursday/Friday    warfarin (COUMADIN) 12.5 mg, oral, 4 times weekly, Tue/Wed/Sat/Sun    warfarin (COUMADIN) 7.5 mg, oral, Take as directed per After  Visit Summary. Pt takes Monday, Wednesday and Friday.    warfarin (COUMADIN) 10 mg, oral, Take as directed per After Visit Summary. Pt takes Tuesday, Thursday, Saturday and Sunday.       Past Medical History:   Diagnosis Date    Asthma     Cataract     CHF (congestive heart failure)     Clotting disorder (Multi)     Diabetes mellitus (Multi)     GERD (gastroesophageal reflux disease)     Hyperlipidemia     Hypertension     Peripheral neuropathy     Pulmonary embolus     Sleep apnea         Past Surgical History:   Procedure Laterality Date    CATARACT EXTRACTION      EYE SURGERY  03/19/2015    Eye Surgery    MR HEAD ANGIO WO IV CONTRAST  05/18/2022    MR HEAD ANGIO WO IV CONTRAST 5/18/2022 [facility]    MR NECK ANGIO WO IV CONTRAST  05/18/2022    MR NECK ANGIO WO IV CONTRAST 5/18/2022 [facility]    TONSILLECTOMY  09/28/2017    Tonsillectomy    TUBAL LIGATION  09/28/2017    Tubal Ligation        Family History   Problem Relation Name Age of Onset    Hypertension Mother      Hyperlipidemia Mother      Diabetes Mother      Breast cancer Brother          Allergies   Allergen Reactions    Amoxicillin Diarrhea    Amoxicillin-Pot Clavulanate Diarrhea     SEVERE DIARRHEA    Dapagliflozin Rash     Yeast infect in perineal area    Hydrochlorothiazide Other     DECREASE SEXUAL LIBIDO    Low libido    Lisinopril Cough     COUGH    Losartan Cough        Objective     There were no vitals filed for this visit.     Physical Exam  General: NAD, well groomed, well nourished  Eyes: Non-icteric sclera, EOMI  Ears, Nose, Mouth, and Throat: External ears and nose appear to be without deformity or rash. No lesions or masses noted. Hearing is grossly intact.   Neck: Trachea midline  Respiratory: Nonlabored breathing   Cardiovascular: no peripheral edema   Skin: No rashes or open lesions/ulcers identified on skin.    Back:   Palpation: Mild tenderness to palpation over lumbar paraspinous muscles.     Hip: No pain over greater  trochanters. and Pain not reproduced with hip internal/external rotation.     Neurologic:   Cranial nerves grossly intact.   Strength 5/5 and symmetric plantar/dorsiflexion   Sensation: Normal to light touch throughout, pinprick  intact throughout.  DTRs:normal and symmetric throughout  [place]: absent  Clonus: absent    Psychiatric: Alert, orientation to person, place, and time. Cooperative.    Imaging personally reviewed and independently interpreted:     Assessment/Plan   Lia Jeong is a 75 y.o. female presenting with low back pain. Pain radiates down the lateral aspect of her right leg to the knee. Pain is worsened with lumbar flexion and prolonged sitting. Patient denies weakness and bowel/bladder incontinence. Recent Xrays showing chronic compression fracture at L1 and L3 and facet arthrosis. Discussed with patient we would like to obtain MRI lumbar spine to determine age of fractures, evaluate for central/foraminal stenosis, and plan for future injections if needed. Patient agreeable at this time.    Plan:  -MRI lumbar spine     Follow up:  After MRI     The patient was invited to contact us back anytime with any questions or concerns and follow-up with us in the office as needed.     Diagnoses and all orders for this visit:  Low back pain, unspecified back pain laterality, unspecified chronicity, unspecified whether sciatica present  -     Referral to Pain Medicine      This note was generated with the aid of dictation software, there may be typos despite my attempts at proofreading.     Patient seen and plan of care discussed with Dr. Isaiah Armstrong MD  PGY-1

## 2025-01-30 ENCOUNTER — TELEPHONE (OUTPATIENT)
Dept: GASTROENTEROLOGY | Facility: HOSPITAL | Age: 75
End: 2025-01-30
Payer: MEDICARE

## 2025-02-03 ENCOUNTER — EVALUATION (OUTPATIENT)
Dept: PHYSICAL THERAPY | Facility: CLINIC | Age: 75
End: 2025-02-03
Payer: MEDICARE

## 2025-02-03 DIAGNOSIS — M54.50 LOW BACK PAIN, UNSPECIFIED BACK PAIN LATERALITY, UNSPECIFIED CHRONICITY, UNSPECIFIED WHETHER SCIATICA PRESENT: ICD-10-CM

## 2025-02-03 PROCEDURE — 97162 PT EVAL MOD COMPLEX 30 MIN: CPT | Mod: GP

## 2025-02-03 PROCEDURE — 97110 THERAPEUTIC EXERCISES: CPT | Mod: GP

## 2025-02-03 ASSESSMENT — ENCOUNTER SYMPTOMS
OCCASIONAL FEELINGS OF UNSTEADINESS: 1
LOSS OF SENSATION IN FEET: 1
DEPRESSION: 0

## 2025-02-03 ASSESSMENT — PAIN DESCRIPTION - DESCRIPTORS: DESCRIPTORS: ACHING;BURNING;THROBBING

## 2025-02-03 ASSESSMENT — PAIN SCALES - GENERAL: PAINLEVEL_OUTOF10: 3

## 2025-02-03 ASSESSMENT — PAIN - FUNCTIONAL ASSESSMENT: PAIN_FUNCTIONAL_ASSESSMENT: 0-10

## 2025-02-03 NOTE — PROGRESS NOTES
Physical Therapy    Physical Therapy Evaluation    Patient Name: Lia Jeong  MRN: [id number]  Today's Date: 2/3/2025    Time Entry:  Time Calculation  Start Time: 0800  Stop Time: 0845  Time Calculation (min): 45 min  PT Evaluation Time Entry  PT Evaluation (Moderate) Time Entry: 35  PT Therapeutic Procedures Time Entry  Therapeutic Exercise Time Entry: 10                 Visiti#1  Insurance; Medicare  Cert; 2/3/2025 - 5/1/2025  Problem List Items Addressed This Visit             ICD-10-CM       Musculoskeletal and Injuries    Low back pain M54.50    Relevant Orders    Follow Up In Physical Therapy       Assessment  Patient presents with chronic lower back pain without sciatica at this time. Patient seem to be improving pain intensity from intense attack in November of 2024. Patient does have hx of L1 and L3 compression fractures however their significance is not clear and MRI is scheduled for 2-5-2025. Patient lacks good lumbar ROM and reports mild to moderate degree of pain with different plane movements of lumbar spine in upright position. Posture in sitting is poor to fair. Standing reveals mild right lateral shift, without known significance relating to pain. Patient lacking knowledge of there ex in order to better manage pain or improve strength of legs and gait. Patient will benefit from skilled PT in re learning exercise regimen, achieve better strength and body mechanics in order to stand without arm assist and improve functional gait speed. Patient motivated and agreeable with POC    Plan  Treatment/Interventions: Education/ Instruction, Manual therapy, Mechanical traction, Gait training  PT Plan: Skilled PT  Certification Period Start Date: 02/03/25  Certification Period End Date: 05/01/25  Rehab Potential: Fair  Plan of Care Agreement: Patient  3 follow ups recommended prior to reassessment    Current Problem  1. Low back pain, unspecified back pain laterality, unspecified chronicity, unspecified  whether sciatica present  Referral to Physical Therapy    Follow Up In Physical Therapy          Subjective   General:  General  Reason for Referral: PT eval and treat; low back pain  Referred By: Dr ALETHEA Gray  Past Medical History Relevant to Rehab: Hx of lower back pain present for many years (On Thanksgiving 2024 had new and more intese attack of back pain. Pain has been treated and has improved a little since)  General Comment: I was treated in ER for back pain in November. Tylenol and muscle relaxers were given but did not help (Bed rest helped me deal with pain)  Precautions: discussed danger of falling and ways to limit fall risk  Precautions  [facility] (The score of 4 or more indicates an increased risk of falling): 8  Vital Signs: NA     Pain:  Pain Assessment: 0-10  0-10 (Numeric) Pain Score: 3  Pain Type: Chronic pain  Pain Location:  (lower back pain - center)  Pain Orientation:  (center)  Pain Radiating Towards: no  Pain Descriptors: Aching, Burning, Throbbing  Pain Frequency: Constant/continuous  Pain Onset: Gradual (with occasional intense pain episodes, and last attack on Thankgiving of 2024)  Clinical Progression: Gradually improving  Effect of Pain on Daily Activities: Pain slows me down, limits me with house cleaning, walking up/down stairs etc. I work through the pain  Patient's Stated Pain Goal:  (I want to get rid off my pain and improve how I walk. I think pain effects how I walk)  Pain Interventions:  (Gabapentin and Tylenol every day, sometimes 3 x day. I take other over the counter pain pills for about a week)  Response to Interventions:  (Managing)  Home Living: lives with  in house  Few steps to enter and to go into basement      Prior Function Per Pt/Caregiver Report: independent      Objective   Posture: forward head posture with poor sitting postural awareness. Standing reveals right lateral shift, relevance not known     Range of Motion: lumbar spine  Flexion -  moderate loss  Extension- moderate loss  Lateral flexion left/right - moderate loss     Strength: core muscles strength 3-/5  Bilateral LE;  Hip flexion 4+/5  Hip extensions 5-/5  Hip abductions 5-/5  Hip adduction 5-/5     Flexibility: NA     Palpation: mild to moderate tenderness with palpation of lower lumbar spine and bilateral SIJ     Special Tests: (repeated movement testing)  Lumbar flexion from sitting; produces pain but no worse  Lumbar extensions in standing; no change  Lateral flexion to right; increased pain but not worse  Lateral flexion to left; NA     Gait: slow gait, short steps, reduced trunk dissociation in trunk  Gait speed = 0.6 meters/sec     Balance: fair     Transfers: arm assist necessary    Outcome Measures:  Modified Oswestry = 42%  5 x sit/stand = 35 seconds   OP EDUCATION:  PT intervention;  Hook lying isometric Transverse Abdominals contractions  Single knee to chest from lying  Double knee to chest from lying  Sitting posture support instructed/towel roll in lumbar region  Sitting and standing verbal and visual postural correction/teaching; scapular retractions, chin retractions, chest up    Goals:  Active       PT Problem       PT Goal 1       Start:  02/03/25    Expected End:  05/01/25       Patient will reduce difficulties related to functional mobilities and quality of life, as evident by Modified Oswestry score reduction by 50%         PT Goal 2       Start:  02/03/25    Expected End:  05/01/25       Patient will report reduced/abolished pain in lower back, indicated by grade of 0-2 on 0-10 pain scale          PT Goal 3       Start:  02/03/25    Expected End:  05/01/25       Demo improved core and legs strength to 5-/5, evident by ease of sit/stand transfers without need for arm assist          PT Goal 4       Start:  02/03/25    Expected End:  05/01/25       Improve gait cycle and gait speed over 1 meter/sec

## 2025-02-04 ENCOUNTER — HOSPITAL ENCOUNTER (OUTPATIENT)
Dept: GASTROENTEROLOGY | Facility: HOSPITAL | Age: 75
Discharge: HOME | End: 2025-02-04
Payer: MEDICARE

## 2025-02-04 VITALS
SYSTOLIC BLOOD PRESSURE: 157 MMHG | WEIGHT: 209.44 LBS | DIASTOLIC BLOOD PRESSURE: 81 MMHG | OXYGEN SATURATION: 97 % | HEIGHT: 65 IN | RESPIRATION RATE: 16 BRPM | BODY MASS INDEX: 34.89 KG/M2 | TEMPERATURE: 97.2 F | HEART RATE: 67 BPM

## 2025-02-04 DIAGNOSIS — Z86.0101 HISTORY OF ADENOMATOUS POLYP OF COLON: Primary | ICD-10-CM

## 2025-02-04 DIAGNOSIS — Z13.9 SCREENING DUE: ICD-10-CM

## 2025-02-04 LAB
GLUCOSE BLD MANUAL STRIP-MCNC: 120 MG/DL (ref 74–99)
GLUCOSE BLD MANUAL STRIP-MCNC: 141 MG/DL (ref 74–99)

## 2025-02-04 PROCEDURE — 3700000012 HC SEDATION LEVEL 5+ TIME - INITIAL 15 MINUTES 5/> YEARS

## 2025-02-04 PROCEDURE — 45380 COLONOSCOPY AND BIOPSY: CPT | Performed by: INTERNAL MEDICINE

## 2025-02-04 PROCEDURE — 82947 ASSAY GLUCOSE BLOOD QUANT: CPT

## 2025-02-04 PROCEDURE — G0500 MOD SEDAT ENDO SERVICE >5YRS: HCPCS | Performed by: INTERNAL MEDICINE

## 2025-02-04 PROCEDURE — 2500000004 HC RX 250 GENERAL PHARMACY W/ HCPCS (ALT 636 FOR OP/ED): Performed by: INTERNAL MEDICINE

## 2025-02-04 PROCEDURE — 3700000013 HC SEDATION LEVEL 5+ TIME - EACH ADDITIONAL 15 MINUTES

## 2025-02-04 PROCEDURE — 7100000010 HC PHASE TWO TIME - EACH INCREMENTAL 1 MINUTE

## 2025-02-04 PROCEDURE — 7100000009 HC PHASE TWO TIME - INITIAL BASE CHARGE

## 2025-02-04 PROCEDURE — 45385 COLONOSCOPY W/LESION REMOVAL: CPT | Performed by: INTERNAL MEDICINE

## 2025-02-04 RX ORDER — FENTANYL CITRATE 50 UG/ML
INJECTION, SOLUTION INTRAMUSCULAR; INTRAVENOUS AS NEEDED
Status: COMPLETED | OUTPATIENT
Start: 2025-02-04 | End: 2025-02-04

## 2025-02-04 RX ORDER — MIDAZOLAM HYDROCHLORIDE 1 MG/ML
INJECTION, SOLUTION INTRAMUSCULAR; INTRAVENOUS AS NEEDED
Status: COMPLETED | OUTPATIENT
Start: 2025-02-04 | End: 2025-02-04

## 2025-02-04 RX ADMIN — MIDAZOLAM HYDROCHLORIDE 1 MG: 1 INJECTION, SOLUTION INTRAMUSCULAR; INTRAVENOUS at 14:00

## 2025-02-04 RX ADMIN — MIDAZOLAM HYDROCHLORIDE 2 MG: 1 INJECTION, SOLUTION INTRAMUSCULAR; INTRAVENOUS at 13:54

## 2025-02-04 RX ADMIN — FENTANYL CITRATE 25 MCG: 50 INJECTION, SOLUTION INTRAMUSCULAR; INTRAVENOUS at 13:57

## 2025-02-04 RX ADMIN — MIDAZOLAM HYDROCHLORIDE 1 MG: 1 INJECTION, SOLUTION INTRAMUSCULAR; INTRAVENOUS at 13:57

## 2025-02-04 RX ADMIN — FENTANYL CITRATE 25 MCG: 50 INJECTION, SOLUTION INTRAMUSCULAR; INTRAVENOUS at 14:00

## 2025-02-04 RX ADMIN — FENTANYL CITRATE 25 MCG: 50 INJECTION, SOLUTION INTRAMUSCULAR; INTRAVENOUS at 14:50

## 2025-02-04 RX ADMIN — FENTANYL CITRATE 50 MCG: 50 INJECTION, SOLUTION INTRAMUSCULAR; INTRAVENOUS at 13:54

## 2025-02-04 ASSESSMENT — COLUMBIA-SUICIDE SEVERITY RATING SCALE - C-SSRS
6. HAVE YOU EVER DONE ANYTHING, STARTED TO DO ANYTHING, OR PREPARED TO DO ANYTHING TO END YOUR LIFE?: NO
2. HAVE YOU ACTUALLY HAD ANY THOUGHTS OF KILLING YOURSELF?: NO
1. IN THE PAST MONTH, HAVE YOU WISHED YOU WERE DEAD OR WISHED YOU COULD GO TO SLEEP AND NOT WAKE UP?: NO

## 2025-02-04 ASSESSMENT — PAIN SCALES - GENERAL

## 2025-02-04 ASSESSMENT — PAIN - FUNCTIONAL ASSESSMENT
PAIN_FUNCTIONAL_ASSESSMENT: 0-10

## 2025-02-04 NOTE — DISCHARGE INSTRUCTIONS

## 2025-02-04 NOTE — H&P
History Of Present Illness  Lia Jeong is a 75 y.o. female presenting for colonoscopy.  Reports history of polyps on previous colonoscopies.  Last colonoscopy around 2012.  Last dose of warfarin was 2/1/25.  Care Everywhere reviewed and showed that patient had colonoscopy in 2021 (not 2012) with removal of 2 adenomatous polyps.    Past Medical History  Past Medical History:   Diagnosis Date    Asthma     Cataract     CHF (congestive heart failure)     Clotting disorder (Multi)     Diabetes mellitus (Multi)     GERD (gastroesophageal reflux disease)     Hyperlipidemia     Hypertension     Peripheral neuropathy     Pulmonary embolus     Sleep apnea      Surgical History  Past Surgical History:   Procedure Laterality Date    CATARACT EXTRACTION      EYE SURGERY  03/19/2015    Eye Surgery    MR HEAD ANGIO WO IV CONTRAST  05/18/2022    MR HEAD ANGIO WO IV CONTRAST 5/18/2022 [facility]    MR NECK ANGIO WO IV CONTRAST  05/18/2022    MR NECK ANGIO WO IV CONTRAST 5/18/2022 [facility]    TONSILLECTOMY  09/28/2017    Tonsillectomy    TUBAL LIGATION  09/28/2017    Tubal Ligation     Social History  She reports that she has never smoked. She has never been exposed to tobacco smoke. She has never used smokeless tobacco. She reports that she does not drink alcohol and does not use drugs.    Family History  Family History   Problem Relation Name Age of Onset    Hypertension Mother      Hyperlipidemia Mother      Diabetes Mother      Breast cancer Brother          Allergies  Allergies   Allergen Reactions    Amoxicillin Diarrhea    Amoxicillin-Pot Clavulanate Diarrhea     SEVERE DIARRHEA    Dapagliflozin Rash     Yeast infect in perineal area    Hydrochlorothiazide Other     DECREASE SEXUAL LIBIDO    Low libido    Lisinopril Cough     COUGH    Losartan Cough     Review of Systems  Pre-sedation Evaluation:  ASA Classification - ASA 3 - Patient with moderate systemic disease with functional limitations  Mallampati Score -  "III (soft and hard palate and base of uvula visible)    Physical Exam  Cardiovascular:      Rate and Rhythm: Normal rate and regular rhythm.   Pulmonary:      Breath sounds: Normal breath sounds. No wheezing.   Abdominal:      Palpations: Abdomen is soft.      Tenderness: There is no abdominal tenderness.          Last Recorded Vitals  Blood pressure (!) 192/87, pulse 71, temperature 36.1 °C (97 °F), temperature source Temporal, resp. rate 14, height 1.651 m (5' 5\"), weight 95 kg (209 lb 7 oz), SpO2 96%.     Assessment/Plan   Colonoscopy for surveillance (previous procedures not available for review).  Patient's last dose of warfarin was 2/1 which is shorter than 5 days of holding warfarin recommended prior to a high risk endoscopic maneuver such as polypectomy.  Discussed with patient and decision was made to do point of care INR test which showed INR to be 1.2.  Patient was agreeable to proceeding.     PTA/Current Medications:  (Not in a hospital admission)    Current Outpatient Medications   Medication Sig Dispense Refill    albuterol 90 mcg/actuation inhaler inhale two puffs by mouth every 6 hours as needed      atorvastatin (Lipitor) 80 mg tablet Take 1 tablet (80 mg) by mouth once daily.      bimatoprost (Lumigan) 0.01 % ophthalmic solution Administer 1 drop into both eyes once daily at bedtime. 5 mL 0    cloNIDine (Catapres) 0.1 mg tablet Take 1 tablet (0.1 mg) by mouth twice a day.      dilTIAZem LA (Cardizem LA) 180 mg 24 hr tablet Take 1 tablet (180 mg) by mouth once daily.      ezetimibe (Zetia) 10 mg tablet Take 1 tablet (10 mg) by mouth once daily.      Farxiga 5 mg Take 1 tablet (5 mg) by mouth once daily with breakfast.      fluticasone (Flonase) 50 mcg/actuation nasal spray Administer 1 spray into each nostril once daily.      gabapentin (Neurontin) 600 mg tablet Take 2 tablets (1,200 mg) by mouth 3 times a day.      hydrALAZINE (Apresoline) 50 mg tablet Take 0.5 tablets (25 mg) by mouth 3 times a " day. *Please note change in dose 45 tablet 0    insulin NPH and regular human (NovoLIN) 100 unit/mL (70-30) injection Inject 45 Units under the skin 2 times a day before meals.      Lantus U-100 Insulin 100 unit/mL injection INJECT 50 UNITS SUBCUTANEOUSLY EVERY MORNING      metoprolol tartrate (Lopressor) 50 mg tablet Take 1 tablet by mouth 2 times a day.      montelukast (Singulair) 10 mg tablet Take 1 tablet (10 mg) by mouth once daily at bedtime.      Ozempic 0.25 mg or 0.5 mg (2 mg/3 mL) pen injector Last taken 1/27      potassium chloride CR 20 mEq ER tablet TAKE TWO TABLETS (40 MEQ) BY MOUTH DAILY WITH FOOD      Trelegy Ellipta 200-62.5-25 mcg blister with device INHALE ONE PUFF BY MOUTH EVERY MORNING      warfarin (Coumadin) 10 mg tablet Take 1 tablet (10 mg) by mouth. Take as directed per After Visit Summary. Pt takes Tuesday, Thursday, Saturday and Sunday.    Last taken 2/2      clonazePAM (KlonoPIN) 0.5 mg tablet  (Patient not taking: Reported on 2/4/2025)      clonazePAM (KlonoPIN) 1 mg tablet Take 1 tablet (1 mg) by mouth every 8 hours if needed. (Patient not taking: Reported on 2/4/2025)      DULoxetine (Cymbalta) 30 mg DR capsule Take 1 capsule (30 mg) by mouth once daily. (Patient not taking: No sig reported)      primidone (Mysoline) 50 mg tablet Take 2 tablets (100 mg) by mouth 2 times a day. (Patient not taking: Reported on 2/3/2025) 120 tablet 3    torsemide (Demadex) 20 mg tablet Take 1 tablet (20 mg) by mouth once daily. 30 tablet 0     No current facility-administered medications for this encounter.     Elzbieta Lara MD

## 2025-02-05 ENCOUNTER — HOSPITAL ENCOUNTER (OUTPATIENT)
Dept: RADIOLOGY | Facility: CLINIC | Age: 75
Discharge: HOME | End: 2025-02-05
Payer: MEDICARE

## 2025-02-05 DIAGNOSIS — M54.16 LUMBAR RADICULOPATHY: ICD-10-CM

## 2025-02-05 DIAGNOSIS — S32.010S COMPRESSION FRACTURE OF L1 VERTEBRA, SEQUELA: ICD-10-CM

## 2025-02-05 DIAGNOSIS — S32.030S COMPRESSION FRACTURE OF L3 VERTEBRA, SEQUELA: ICD-10-CM

## 2025-02-05 PROCEDURE — 72148 MRI LUMBAR SPINE W/O DYE: CPT

## 2025-02-05 PROCEDURE — 72148 MRI LUMBAR SPINE W/O DYE: CPT | Performed by: RADIOLOGY

## 2025-02-05 ASSESSMENT — PAIN SCALES - GENERAL: PAINLEVEL_OUTOF10: 0 - NO PAIN

## 2025-02-06 ENCOUNTER — APPOINTMENT (OUTPATIENT)
Dept: OPHTHALMOLOGY | Facility: CLINIC | Age: 75
End: 2025-02-06
Payer: MEDICARE

## 2025-02-06 DIAGNOSIS — H04.123 DRY EYES: ICD-10-CM

## 2025-02-06 DIAGNOSIS — D31.32 CHOROIDAL NEVUS, LEFT EYE: ICD-10-CM

## 2025-02-06 DIAGNOSIS — H40.1131 PRIMARY OPEN ANGLE GLAUCOMA (POAG) OF BOTH EYES, MILD STAGE: Primary | ICD-10-CM

## 2025-02-06 DIAGNOSIS — H52.223 REGULAR ASTIGMATISM OF BOTH EYES: ICD-10-CM

## 2025-02-06 DIAGNOSIS — D31.31 NEVUS OF CHOROID OF RIGHT EYE: ICD-10-CM

## 2025-02-06 DIAGNOSIS — H52.03 HYPEROPIA OF BOTH EYES: ICD-10-CM

## 2025-02-06 DIAGNOSIS — H26.493 BILATERAL POSTERIOR CAPSULAR OPACIFICATION: ICD-10-CM

## 2025-02-06 DIAGNOSIS — H52.4 PRESBYOPIA: ICD-10-CM

## 2025-02-06 DIAGNOSIS — E11.3393 MODERATE NONPROLIFERATIVE DIABETIC RETINOPATHY OF BOTH EYES WITHOUT MACULAR EDEMA ASSOCIATED WITH TYPE 2 DIABETES MELLITUS: ICD-10-CM

## 2025-02-06 LAB
AVERAGE RNFL TODAY (OD): 59
AVERAGE RNFL TODAY (OS): 66
C/D RATIO TODAY (OD): 0.78
C/D RATIO TODAY (OS): 0.62

## 2025-02-06 PROCEDURE — 92133 CPTRZD OPH DX IMG PST SGM ON: CPT | Performed by: OPHTHALMOLOGY

## 2025-02-06 PROCEDURE — 92014 COMPRE OPH EXAM EST PT 1/>: CPT | Performed by: OPHTHALMOLOGY

## 2025-02-06 ASSESSMENT — ENCOUNTER SYMPTOMS
ENDOCRINE NEGATIVE: 0
RESPIRATORY NEGATIVE: 0
HEMATOLOGIC/LYMPHATIC NEGATIVE: 0
CONSTITUTIONAL NEGATIVE: 0
GASTROINTESTINAL NEGATIVE: 0
EYES NEGATIVE: 1
MUSCULOSKELETAL NEGATIVE: 0
PSYCHIATRIC NEGATIVE: 0
NEUROLOGICAL NEGATIVE: 0
ALLERGIC/IMMUNOLOGIC NEGATIVE: 0
CARDIOVASCULAR NEGATIVE: 0

## 2025-02-06 ASSESSMENT — CONF VISUAL FIELD
OD_NORMAL: 1
OS_INFERIOR_NASAL_RESTRICTION: 0
OS_NORMAL: 1
OD_INFERIOR_NASAL_RESTRICTION: 0
OD_INFERIOR_TEMPORAL_RESTRICTION: 0
OS_INFERIOR_TEMPORAL_RESTRICTION: 0
OD_SUPERIOR_TEMPORAL_RESTRICTION: 0
OS_SUPERIOR_NASAL_RESTRICTION: 0
OD_SUPERIOR_NASAL_RESTRICTION: 0
OS_SUPERIOR_TEMPORAL_RESTRICTION: 0

## 2025-02-06 ASSESSMENT — REFRACTION_WEARINGRX
OD_SPHERE: +1.00
OD_AXIS: 080
OS_SPHERE: +0.75
OD_CYLINDER: -1.00
OD_ADD: +2.50
OS_AXIS: 075
OS_ADD: +2.50
OS_CYLINDER: -0.25

## 2025-02-06 ASSESSMENT — REFRACTION_MANIFEST
OD_ADD: +2.50
OD_SPHERE: +0.75
OS_ADD: +2.50
OS_SPHERE: +0.25
OS_CYLINDER: -0.25
OD_AXIS: 080
OD_CYLINDER: -1.00
OS_AXIS: 075

## 2025-02-06 ASSESSMENT — VISUAL ACUITY
CORRECTION_TYPE: GLASSES
METHOD: SNELLEN - LINEAR
OS_CC: 20/30-2
OD_CC: 20/25

## 2025-02-06 ASSESSMENT — EXTERNAL EXAM - LEFT EYE: OS_EXAM: NORMAL

## 2025-02-06 ASSESSMENT — TONOMETRY
IOP_METHOD: GOLDMANN APPLANATION
OD_IOP_MMHG: 14
OS_IOP_MMHG: 14

## 2025-02-06 ASSESSMENT — EXTERNAL EXAM - RIGHT EYE: OD_EXAM: NORMAL

## 2025-02-06 ASSESSMENT — PACHYMETRY
OD_CT(UM): 565
OS_CT(UM): 540

## 2025-02-06 ASSESSMENT — SLIT LAMP EXAM - LIDS
COMMENTS: NORMAL
COMMENTS: NORMAL

## 2025-02-06 ASSESSMENT — CUP TO DISC RATIO
OD_RATIO: 0.75
OS_RATIO: 0.7

## 2025-02-06 NOTE — PROGRESS NOTES
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

## 2025-02-10 LAB
LABORATORY COMMENT REPORT: NORMAL
PATH REPORT.FINAL DX SPEC: NORMAL
PATH REPORT.GROSS SPEC: NORMAL
PATH REPORT.RELEVANT HX SPEC: NORMAL
PATH REPORT.TOTAL CANCER: NORMAL

## 2025-02-19 ENCOUNTER — OFFICE VISIT (OUTPATIENT)
Dept: PAIN MEDICINE | Facility: HOSPITAL | Age: 75
End: 2025-02-19
Payer: MEDICARE

## 2025-02-19 DIAGNOSIS — S32.030G COMPRESSION FRACTURE OF L3 VERTEBRA WITH DELAYED HEALING, SUBSEQUENT ENCOUNTER: ICD-10-CM

## 2025-02-19 DIAGNOSIS — S32.030S COMPRESSION FRACTURE OF L3 VERTEBRA, SEQUELA: Primary | ICD-10-CM

## 2025-02-19 PROCEDURE — 1157F ADVNC CARE PLAN IN RCRD: CPT | Performed by: PAIN MEDICINE

## 2025-02-19 PROCEDURE — 99213 OFFICE O/P EST LOW 20 MIN: CPT | Performed by: PAIN MEDICINE

## 2025-02-19 PROCEDURE — 1125F AMNT PAIN NOTED PAIN PRSNT: CPT | Performed by: PAIN MEDICINE

## 2025-02-19 ASSESSMENT — PAIN SCALES - GENERAL: PAINLEVEL_OUTOF10: 6

## 2025-02-19 ASSESSMENT — PAIN - FUNCTIONAL ASSESSMENT: PAIN_FUNCTIONAL_ASSESSMENT: 0-10

## 2025-02-19 NOTE — PROGRESS NOTES
Subjective   Patient ID: Lia Jeong is a 75 y.o. female with a past medical history of subacute L3 compression fracture    HPI:   Lia is here for follow-up after lumbar MRI. She has had back pain for several years after falling out of the bed 3 years ago. Recently, she fell in the kitchen during Thanksgiving 2024. She initially had some radicular symptoms down her thighs, but the pain now travels up to her midback and down to her low back. She does not endorse radicular symptoms. She recently started PT. Her imaging shows L3 compression fracture with 25% height loss and (+) STIR imaging. The pain has slightly improved since thanksgiving, but still affects her daily life.     Physical Therapy: The patient has done six or more weeks of physical therapy in the past six months with minimal improvement  Other Conservative Measures she has tried: Heating Pad, Ice, and Massage/myofascial release  Classes of medications tried in the past: Acetaminophen, NSAIDs, Gabapentenoids, and SNRIs     0-10 (Numeric) Pain Score: 6       Review of Systems   13-point ROS done and negative except for HPI.     Current Outpatient Medications   Medication Instructions    albuterol 90 mcg/actuation inhaler inhale two puffs by mouth every 6 hours as needed    atorvastatin (LIPITOR) 80 mg, Daily    bimatoprost (Lumigan) 0.01 % ophthalmic solution 1 drop, Both Eyes, Nightly    clonazePAM (KlonoPIN) 0.5 mg tablet     clonazePAM (KLONOPIN) 1 mg, Every 8 hours PRN    cloNIDine (CATAPRES) 0.1 mg, 2 times daily    dilTIAZem LA (Cardizem LA) 180 mg 24 hr tablet 1 tablet, Daily    DULoxetine (CYMBALTA) 30 mg, Daily    ezetimibe (ZETIA) 10 mg, Daily    Farxiga 5 mg, oral, Daily with breakfast    fluticasone (Flonase) 50 mcg/actuation nasal spray 1 spray, Daily    gabapentin (NEURONTIN) 1,200 mg, 3 times daily    hydrALAZINE (APRESOLINE) 25 mg, oral, 3 times daily, *Please note change in dose    insulin NPH and regular human (NovoLIN) 100 unit/mL  (70-30) injection 45 Units, 2 times daily before meals    Lantus U-100 Insulin 100 unit/mL injection INJECT 50 UNITS SUBCUTANEOUSLY EVERY MORNING    metoprolol tartrate (LOPRESSOR) 50 mg, 2 times daily    montelukast (SINGULAIR) 10 mg, Nightly    Ozempic 0.25 mg or 0.5 mg (2 mg/3 mL) pen injector Last taken 1/27    potassium chloride CR 20 mEq ER tablet TAKE TWO TABLETS (40 MEQ) BY MOUTH DAILY WITH FOOD    primidone (MYSOLINE) 100 mg, oral, 2 times daily    torsemide (DEMADEX) 20 mg, oral, Daily    Trelegy Ellipta 200-62.5-25 mcg blister with device INHALE ONE PUFF BY MOUTH EVERY MORNING    warfarin (COUMADIN) 10 mg, oral, Take as directed per After Visit Summary. Pt takes Tuesday, Thursday, Saturday and Sunday.    Last taken 2/2       Past Medical History:   Diagnosis Date    Asthma     Cataract     CHF (congestive heart failure)     Clotting disorder (Multi)     Diabetes mellitus (Multi)     GERD (gastroesophageal reflux disease)     Hyperlipidemia     Hypertension     Peripheral neuropathy     Pulmonary embolus     Sleep apnea         Past Surgical History:   Procedure Laterality Date    CATARACT EXTRACTION      EYE SURGERY  03/19/2015    Eye Surgery    MR HEAD ANGIO WO IV CONTRAST  05/18/2022    MR HEAD ANGIO WO IV CONTRAST 5/18/2022 [facility]    MR NECK ANGIO WO IV CONTRAST  05/18/2022    MR NECK ANGIO WO IV CONTRAST 5/18/2022 [facility]    TONSILLECTOMY  09/28/2017    Tonsillectomy    TUBAL LIGATION  09/28/2017    Tubal Ligation        Family History   Problem Relation Name Age of Onset    Hypertension Mother      Hyperlipidemia Mother      Diabetes Mother      Breast cancer Brother          Allergies   Allergen Reactions    Amoxicillin Diarrhea    Amoxicillin-Pot Clavulanate Diarrhea     SEVERE DIARRHEA    Dapagliflozin Rash     Yeast infect in perineal area    Hydrochlorothiazide Other     DECREASE SEXUAL LIBIDO    Low libido    Lisinopril Cough     COUGH    Losartan Cough        Objective     There  were no vitals filed for this visit.     Physical Exam  General: NAD, well groomed, well nourished  Eyes: Non-icteric sclera, EOMI  Ears, Nose, Mouth, and Throat: External ears and nose appear to be without deformity or rash. No lesions or masses noted. Hearing is grossly intact.   Neck: Trachea midline  Respiratory: Nonlabored breathing   Cardiovascular: no peripheral edema   Skin: No rashes or open lesions/ulcers identified on skin.    Back:   Palpation:  Tenderness to palpation over lumbar paraspinous muscles and over the spinous process from approximately L1-L5  Straight leg raise: does not reproduce their pain, bilaterally     Neurologic:   Cranial nerves grossly intact.   Strength 5/5 and symmetric plantar/dorsiflexion   Sensation: Normal to light touch throughout, pinprick  intact throughout.    Psychiatric: Alert, orientation to person, place, and time. Cooperative.    Imaging personally reviewed and independently interpreted:   MR lumbar spine wo IV contrast 02/05/2025    Narrative  Interpreted By:  Judit Mitchell,  STUDY:  MRI of the lumbar spine without IV contrast;  2/5/2025 9:07 am    INDICATION:  Signs/Symptoms:low back pain.    ,S32.010S Wedge compression fracture of first lumbar vertebra,  sequela,S32.030S Wedge compression fracture of third lumbar vertebra,  sequela,M54.16 Radiculopathy, lumbar region    COMPARISON:  CT abdomen 11/30/2024 and MRI 05/19/2022    ACCESSION NUMBER(S):  [id number]    ORDERING CLINICIAN:  [facility]    TECHNIQUE:  Sagittal and axial STIR and T1-weighted MRI images of the lumbar  spine were acquired using a spondylolysis protocol.  No contrast was  administered.    FINDINGS:  For counting purposes the last fully segmented vertebral body is  labeled L5. There is partial lumbarization of the S1 vertebral body.  The last fully formed disc space on axial image 16 series 9 we will  be labeled L5-S1.    There is trace retrolisthesis of L1 on L2 and trace anterolisthesis  of  L5 on S1. Alignment is otherwise maintained.    There is a nonacute prominent Schmorl's node along the superior  endplate of L1, similar to the prior exam.    There is a superior endplate fracture/compression deformity at L3 new  from the prior MRI 05/19/2022 and slightly increased compared to the  prior CTA abdomen 11/30/2024 given differences in technique. There is  a proximally 25% loss of vertebral body height and trace osseous  retropulsion of the superior posterior cortex. There is increased  STIR signal along the superior endplate and anterior L3 vertebral  body extending into the posterior element on the left. Vertebral body  heights are otherwise maintained.    There are numerous T1 and T2 hypointense foci within the T11, T12, L3  and L5 vertebral bodies corresponding to sclerotic foci on the prior  CT and may represent bone islands.    There is desiccated disc signal throughout the lower thoracic and  lumbar spine. No significant loss of disc height.    The conus terminates at L1-L2. Prevertebral soft tissues are not  thickened.    Evaluation by level:    T11-T12: Disc bulge, facet arthrosis and ligamentum flavum  thickening. Mild spinal canal and neural foraminal stenosis.    T12-L1: Disc bulge and facet arthrosis. No spinal canal stenosis.  Mild neural foraminal stenosis.    L1-L2: Disc bulge with a small central disc protrusion, bilateral  facet arthrosis and ligamentum flavum thickening. No spinal canal  stenosis. Mild neural foraminal stenosis.    L2-L3: Disc bulge, facet arthrosis and ligamentum flavum thickening.  Moderate spinal canal stenosis. Mild-to-moderate bilateral neural  foraminal stenosis.    L3-L4: Disc bulge and facet arthrosis. Mild spinal canal and neural  foraminal stenosis.    L4-L5: Disc bulge, facet arthrosis and ligamentum flavum thickening.  Mild spinal canal stenosis. Mild neural foraminal stenosis.    L5-S1: Mild disc uncovering, disc bulge and facet arthrosis. No  spinal canal  stenosis. No neural foraminal stenosis.    Impression  There is an acute to subacute superior endplate fracture and  compression deformity at L3 with 25% loss of vertebral body height  and minimal osseous retropulsion of the posterosuperior cortex.    Degenerative changes most pronounced at L2-L3 with moderate spinal  canal and mild-to-moderate bilateral neural foraminal stenosis.    I personally reviewed the images/study and I agree with the findings  as stated. This study was interpreted at [facility], [place], [place].    MACRO:  None    Signed by: Judit Mitchell 2/5/2025 11:21 AM  Dictation workstation:   [id number]       Assessment/Plan   Lia is a 74yo F with clinical picture and imaging consistent with L3 compression fracture. We had a lengthy discussion between her options. We discussed conservative measures with PT and medication mgmt. Compression fractures heal over time, but it can take months-1 year. We also discussed bilateral L3 TFESI to help cover the pain while the fractures heal. However, she was most interested in kyphoplasty as the pain inhibits her ADLs.     Plan:  -Schedule for L3 kyphoplasty under fluoroscopy and GA. Procedure, risks/benefits, alternate therapies discussed. Will need to stop coumadin for 5 days and check INR the morning of the procedure. Discuss with cardiologist if bridging is necessary.   -PAT clinic evaluation.     The patient has failed treatment with : Physical therapy , three or more classes of medications, they are not a surgical candidate, have significant limitations in their sleep quality due to the pain, have significant limitations of their quality of life due to the pain, have significant impairments of their activities of daily living (ADLs) due to the pain, and have significant impairments of their instrumental activities of daily living (IADLs) due to the pain    We discussed  the risks, benefits and alternatives of the  procedure including but not limited to: , Epidural hematoma, Post-dural puncture headache, Lack of efficacy , Transiently worsening pain , Bleeding, Infection , and Nerve Damage    Follow up: As needed     The patient was invited to contact us back anytime with any questions or concerns and follow-up with us in the office as needed.     Diagnoses and all orders for this visit:  Compression fracture of L3 vertebra with routine healing, subsequent encounter    This note was generated with the aid of dictation software, there may be typos despite my attempts at proofreading.    Alex Amato, DO  Pain Fellow

## 2025-02-26 ENCOUNTER — TREATMENT (OUTPATIENT)
Dept: PHYSICAL THERAPY | Facility: CLINIC | Age: 75
End: 2025-02-26
Payer: MEDICARE

## 2025-02-26 DIAGNOSIS — M54.50 LOW BACK PAIN, UNSPECIFIED BACK PAIN LATERALITY, UNSPECIFIED CHRONICITY, UNSPECIFIED WHETHER SCIATICA PRESENT: ICD-10-CM

## 2025-02-26 PROCEDURE — 97110 THERAPEUTIC EXERCISES: CPT | Mod: GP
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
## 2025-03-02 NOTE — PROGRESS NOTES
3/3/2025 CC: 75 y.o. presents for glaucoma evaluation. Referred by Dr Boothe    Past ocular history: POAG  Family history: (+)family history of glaucoma, mother, father, sister   Past medical history: Asthma, RIANNA (on cpap), DM2, HTN   Social history: denies tobacco     Eye medications:   Both eyes: Lumigan qhs and Dorzolamide bid, Brimonidine bid  Ats PRN    Allergy:  Amox, others per chart     Testing:    HVF 24-2 3/3/2025: OD- full, MD -0.6. OS- I scatters, MD     OCT 2/6/2025: OD- thin I/S/T and bdl N, avg 59. OS- thin S, bdl I, avg 66-low SS, stable, watch OD.   OCT GCA 3/3/2025: sig dropout-diffuse thin OU. Mac.: Regular macular contour OU    Pachymetry 3/3/2025: 563/542    Gonioscopy 3/3/2025: 563/542    Tmax: 26/19    Assessment:   Primary open angle glaucoma, mild stage OU  -FU Dr Boothe  -Positive fhx  -Asymmetrical 0.7/0.6 CDR  - Testing: Vf-full, OCT - Stable/watch OD  Target IOP:  teens  -IOP 3/3/2025:17 OU. CPM.    Pseudophakia OU  -s/p PCIOL OU, 2023 (Dr Carson)  -PCO both eyes  - f/u Dr. Carson for YAG eval OU    Moderate NPDR wo ME  -last A1c 7.8 (2/2025)  -[facility]    Choroidal nevus OU  -[facility]    RE  -FU Dr Boothe  -Low Hyperopia/ ast., near add.    CAM  -AT's PRN    Plan:   I explained my findings. POAG, stable.    Eye medications:   Both eyes: Lumigan qhs and Dorzolamide bid, Brimonidine bid, AT's BID  Space drops out 5-10 minutes between drops, close eyes for 2 minutes after drops, importance of taking drops discussed    Follow up  -Return in glaucoma clinic 6 months for repeat testing.    -Dr. Carson next available YAG eval

## 2025-03-03 ENCOUNTER — APPOINTMENT (OUTPATIENT)
Dept: OPHTHALMOLOGY | Age: 75
End: 2025-03-03
Payer: MEDICARE

## 2025-03-03 DIAGNOSIS — H26.493 PCO (POSTERIOR CAPSULAR OPACIFICATION), BILATERAL: ICD-10-CM

## 2025-03-03 DIAGNOSIS — H40.1131 PRIMARY OPEN ANGLE GLAUCOMA (POAG) OF BOTH EYES, MILD STAGE: Primary | ICD-10-CM

## 2025-03-03 DIAGNOSIS — S32.030G COMPRESSION FRACTURE OF L3 VERTEBRA WITH DELAYED HEALING, SUBSEQUENT ENCOUNTER: ICD-10-CM

## 2025-03-03 PROCEDURE — 99214 OFFICE O/P EST MOD 30 MIN: CPT | Performed by: OPHTHALMOLOGY

## 2025-03-03 PROCEDURE — 76514 ECHO EXAM OF EYE THICKNESS: CPT | Performed by: OPHTHALMOLOGY

## 2025-03-03 PROCEDURE — 92020 GONIOSCOPY: CPT | Performed by: OPHTHALMOLOGY

## 2025-03-03 PROCEDURE — 92083 EXTENDED VISUAL FIELD XM: CPT | Performed by: OPHTHALMOLOGY

## 2025-03-03 PROCEDURE — 92134 CPTRZ OPH DX IMG PST SGM RTA: CPT | Performed by: OPHTHALMOLOGY

## 2025-03-03 ASSESSMENT — GONIOSCOPY: OS_TEMPORAL: C45F1+

## 2025-03-03 ASSESSMENT — VISUAL ACUITY
CORRECTION_TYPE: GLASSES
OD_CC: 20/30
METHOD: SNELLEN - LINEAR
OS_CC: 20/30
OS_CC+: -2

## 2025-03-03 ASSESSMENT — PACHYMETRY
OS_CT(UM): 542
OD_CT(UM): 563

## 2025-03-03 ASSESSMENT — SLIT LAMP EXAM - LIDS
COMMENTS: NORMAL
COMMENTS: NORMAL

## 2025-03-03 ASSESSMENT — CUP TO DISC RATIO
OD_RATIO: 0.75
OS_RATIO: 0.7

## 2025-03-03 ASSESSMENT — EXTERNAL EXAM - LEFT EYE: OS_EXAM: NORMAL

## 2025-03-03 ASSESSMENT — ENCOUNTER SYMPTOMS: EYES NEGATIVE: 1

## 2025-03-03 ASSESSMENT — TONOMETRY
IOP_METHOD: GOLDMANN APPLANATION
OD_IOP_MMHG: 17
OS_IOP_MMHG: 17

## 2025-03-03 ASSESSMENT — EXTERNAL EXAM - RIGHT EYE: OD_EXAM: NORMAL

## 2025-03-04 ENCOUNTER — HOSPITAL ENCOUNTER (OUTPATIENT)
Dept: RADIOLOGY | Facility: HOSPITAL | Age: 75
Discharge: HOME | End: 2025-03-04
Payer: MEDICARE

## 2025-03-04 DIAGNOSIS — S32.030G COMPRESSION FRACTURE OF L3 VERTEBRA WITH DELAYED HEALING, SUBSEQUENT ENCOUNTER: ICD-10-CM

## 2025-03-04 PROCEDURE — 72120 X-RAY BEND ONLY L-S SPINE: CPT

## 2025-03-06 ENCOUNTER — TREATMENT (OUTPATIENT)
Dept: PHYSICAL THERAPY | Facility: CLINIC | Age: 75
End: 2025-03-06
Payer: MEDICARE

## 2025-03-06 DIAGNOSIS — M54.50 LOW BACK PAIN, UNSPECIFIED BACK PAIN LATERALITY, UNSPECIFIED CHRONICITY, UNSPECIFIED WHETHER SCIATICA PRESENT: ICD-10-CM

## 2025-03-06 PROCEDURE — 97110 THERAPEUTIC EXERCISES: CPT | Mod: GP
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
## 2025-03-13 ENCOUNTER — TREATMENT (OUTPATIENT)
Dept: PHYSICAL THERAPY | Facility: CLINIC | Age: 75
End: 2025-03-13
Payer: MEDICARE

## 2025-03-13 ENCOUNTER — PREP FOR PROCEDURE (OUTPATIENT)
Dept: PAIN MEDICINE | Facility: CLINIC | Age: 75
End: 2025-03-13

## 2025-03-13 DIAGNOSIS — M54.50 LOW BACK PAIN, UNSPECIFIED BACK PAIN LATERALITY, UNSPECIFIED CHRONICITY, UNSPECIFIED WHETHER SCIATICA PRESENT: ICD-10-CM

## 2025-03-13 DIAGNOSIS — S32.030G COMPRESSION FRACTURE OF L3 VERTEBRA WITH DELAYED HEALING, SUBSEQUENT ENCOUNTER: Primary | ICD-10-CM

## 2025-03-13 PROBLEM — S32.000G COMPRESSION FRACTURE OF LUMBAR VERTEBRA WITH DELAYED HEALING: Status: ACTIVE | Noted: 2025-03-13

## 2025-03-13 PROCEDURE — 97110 THERAPEUTIC EXERCISES: CPT | Mod: GP
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
## 2025-03-14 ENCOUNTER — APPOINTMENT (OUTPATIENT)
Dept: OPHTHALMOLOGY | Facility: CLINIC | Age: 75
End: 2025-03-14
Payer: MEDICARE

## 2025-03-18 ENCOUNTER — PRE-ADMISSION TESTING (OUTPATIENT)
Dept: PREADMISSION TESTING | Facility: HOSPITAL | Age: 75
End: 2025-03-18
Payer: MEDICARE

## 2025-03-18 ENCOUNTER — LAB (OUTPATIENT)
Dept: LAB | Facility: HOSPITAL | Age: 75
End: 2025-03-18
Payer: MEDICARE

## 2025-03-18 VITALS
TEMPERATURE: 97.9 F | DIASTOLIC BLOOD PRESSURE: 58 MMHG | BODY MASS INDEX: 34.91 KG/M2 | OXYGEN SATURATION: 96 % | SYSTOLIC BLOOD PRESSURE: 113 MMHG | RESPIRATION RATE: 16 BRPM | WEIGHT: 209.5 LBS | HEART RATE: 76 BPM | HEIGHT: 65 IN

## 2025-03-18 DIAGNOSIS — Z79.01 CURRENT USE OF LONG TERM ANTICOAGULATION: ICD-10-CM

## 2025-03-18 DIAGNOSIS — I50.9 HEART FAILURE, UNSPECIFIED HF CHRONICITY, UNSPECIFIED HEART FAILURE TYPE: ICD-10-CM

## 2025-03-18 DIAGNOSIS — Z01.818 PREOP TESTING: Primary | ICD-10-CM

## 2025-03-18 DIAGNOSIS — Z01.818 ENCOUNTER FOR OTHER PREPROCEDURAL EXAMINATION: Primary | ICD-10-CM

## 2025-03-18 DIAGNOSIS — R73.09 ABNORMAL GLUCOSE LEVEL: ICD-10-CM

## 2025-03-18 DIAGNOSIS — I10 PRIMARY HYPERTENSION: ICD-10-CM

## 2025-03-18 DIAGNOSIS — S32.030G COMPRESSION FRACTURE OF L3 VERTEBRA WITH DELAYED HEALING, SUBSEQUENT ENCOUNTER: ICD-10-CM

## 2025-03-18 LAB
ALBUMIN SERPL BCP-MCNC: 3.9 G/DL (ref 3.4–5)
ALP SERPL-CCNC: 118 U/L (ref 33–136)
ALT SERPL W P-5'-P-CCNC: 24 U/L (ref 7–45)
ANION GAP SERPL CALC-SCNC: 11 MMOL/L (ref 10–20)
APTT PPP: 33.9 SECONDS (ref 22–32.5)
AST SERPL W P-5'-P-CCNC: 24 U/L (ref 9–39)
BASOPHILS # BLD AUTO: 0.04 X10*3/UL (ref 0–0.1)
BASOPHILS NFR BLD AUTO: 0.5 %
BILIRUB SERPL-MCNC: 0.4 MG/DL (ref 0–1.2)
BNP SERPL-MCNC: 26 PG/ML (ref 0–99)
BUN SERPL-MCNC: 19 MG/DL (ref 6–23)
CALCIUM SERPL-MCNC: 9.5 MG/DL (ref 8.6–10.3)
CHLORIDE SERPL-SCNC: 106 MMOL/L (ref 98–107)
CO2 SERPL-SCNC: 28 MMOL/L (ref 21–32)
CREAT SERPL-MCNC: 0.85 MG/DL (ref 0.5–1.05)
EGFRCR SERPLBLD CKD-EPI 2021: 72 ML/MIN/1.73M*2
EOSINOPHIL # BLD AUTO: 0.05 X10*3/UL (ref 0–0.4)
EOSINOPHIL NFR BLD AUTO: 0.7 %
ERYTHROCYTE [DISTWIDTH] IN BLOOD BY AUTOMATED COUNT: 14.7 % (ref 11.5–14.5)
EST. AVERAGE GLUCOSE BLD GHB EST-MCNC: 174 MG/DL
GLUCOSE SERPL-MCNC: 92 MG/DL (ref 74–99)
HBA1C MFR BLD: 7.7 %
HCT VFR BLD AUTO: 42 % (ref 36–46)
HGB BLD-MCNC: 13.4 G/DL (ref 12–16)
IMM GRANULOCYTES # BLD AUTO: 0.01 X10*3/UL (ref 0–0.5)
IMM GRANULOCYTES NFR BLD AUTO: 0.1 % (ref 0–0.9)
INR PPP: 2.4 (ref 0.9–1.2)
LYMPHOCYTES # BLD AUTO: 1.14 X10*3/UL (ref 0.8–3)
LYMPHOCYTES NFR BLD AUTO: 15.5 %
MCH RBC QN AUTO: 27.6 PG (ref 26–34)
MCHC RBC AUTO-ENTMCNC: 31.9 G/DL (ref 32–36)
MCV RBC AUTO: 87 FL (ref 80–100)
MONOCYTES # BLD AUTO: 0.63 X10*3/UL (ref 0.05–0.8)
MONOCYTES NFR BLD AUTO: 8.6 %
NEUTROPHILS # BLD AUTO: 5.48 X10*3/UL (ref 1.6–5.5)
NEUTROPHILS NFR BLD AUTO: 74.6 %
NRBC BLD-RTO: ABNORMAL /100{WBCS}
PLATELET # BLD AUTO: 237 X10*3/UL (ref 150–450)
POTASSIUM SERPL-SCNC: 5 MMOL/L (ref 3.5–5.3)
PROT SERPL-MCNC: 6.2 G/DL (ref 6.4–8.2)
PROTHROMBIN TIME: 22.9 SECONDS (ref 9.3–12.7)
RBC # BLD AUTO: 4.85 X10*6/UL (ref 4–5.2)
SODIUM SERPL-SCNC: 140 MMOL/L (ref 136–145)
WBC # BLD AUTO: 7.4 X10*3/UL (ref 4.4–11.3)

## 2025-03-18 PROCEDURE — 80053 COMPREHEN METABOLIC PANEL: CPT

## 2025-03-18 PROCEDURE — 83036 HEMOGLOBIN GLYCOSYLATED A1C: CPT

## 2025-03-18 PROCEDURE — 87081 CULTURE SCREEN ONLY: CPT | Mod: BEALAB

## 2025-03-18 PROCEDURE — 83880 ASSAY OF NATRIURETIC PEPTIDE: CPT

## 2025-03-18 PROCEDURE — 99204 OFFICE O/P NEW MOD 45 MIN: CPT

## 2025-03-18 PROCEDURE — 85730 THROMBOPLASTIN TIME PARTIAL: CPT

## 2025-03-18 PROCEDURE — 85610 PROTHROMBIN TIME: CPT

## 2025-03-18 PROCEDURE — 85025 COMPLETE CBC W/AUTO DIFF WBC: CPT

## 2025-03-18 PROCEDURE — 93005 ELECTROCARDIOGRAM TRACING: CPT

## 2025-03-18 PROCEDURE — 93010 ELECTROCARDIOGRAM REPORT: CPT | Performed by: INTERNAL MEDICINE

## 2025-03-18 RX ORDER — INSULIN LISPRO 100 [IU]/ML
10 INJECTION, SOLUTION INTRAVENOUS; SUBCUTANEOUS
COMMUNITY
Start: 2024-08-20

## 2025-03-18 RX ORDER — CHLORHEXIDINE GLUCONATE ORAL RINSE 1.2 MG/ML
15 SOLUTION DENTAL DAILY
Qty: 30 ML | Refills: 0 | Status: SHIPPED | OUTPATIENT
Start: 2025-03-18 | End: 2025-03-20

## 2025-03-18 RX ORDER — CHLORHEXIDINE GLUCONATE 40 MG/ML
SOLUTION TOPICAL DAILY
Qty: 470 ML | Refills: 0 | Status: SHIPPED | OUTPATIENT
Start: 2025-03-18 | End: 2025-03-23

## 2025-03-18 ASSESSMENT — DUKE ACTIVITY SCORE INDEX (DASI)
CAN YOU DO YARD WORK LIKE RAKING LEAVES, WEEDING OR PUSHING A MOWER: NO
CAN YOU DO HEAVY WORK AROUND THE HOUSE LIKE SCRUBBING FLOORS OR LIFTING AND MOVING HEAVY FURNITURE: NO
DASI METS SCORE: 6.5
CAN YOU TAKE CARE OF YOURSELF (EAT, DRESS, BATHE, OR USE TOILET): YES
CAN YOU HAVE SEXUAL RELATIONS: YES
CAN YOU DO MODERATE WORK AROUND THE HOUSE LIKE VACUUMING, SWEEPING FLOORS OR CARRYING GROCERIES: YES
CAN YOU WALK INDOORS, SUCH AS AROUND YOUR HOUSE: YES
CAN YOU DO LIGHT WORK AROUND THE HOUSE LIKE DUSTING OR WASHING DISHES: YES
CAN YOU PARTICIPATE IN STRENOUS SPORTS LIKE SWIMMING, SINGLES TENNIS, FOOTBALL, BASKETBALL, OR SKIING: NO
CAN YOU PARTICIPATE IN MODERATE RECREATIONAL ACTIVITIES LIKE GOLF, BOWLING, DANCING, DOUBLES TENNIS OR THROWING A BASEBALL OR FOOTBALL: YES
TOTAL_SCORE: 30.2
CAN YOU WALK A BLOCK OR TWO ON LEVEL GROUND: YES
CAN YOU RUN A SHORT DISTANCE: NO
CAN YOU CLIMB A FLIGHT OF STAIRS OR WALK UP A HILL: YES

## 2025-03-18 ASSESSMENT — PAIN - FUNCTIONAL ASSESSMENT: PAIN_FUNCTIONAL_ASSESSMENT: 0-10

## 2025-03-18 ASSESSMENT — PAIN SCALES - GENERAL: PAINLEVEL_OUTOF10: 1

## 2025-03-18 ASSESSMENT — PAIN DESCRIPTION - DESCRIPTORS: DESCRIPTORS: ACHING;DULL

## 2025-03-18 NOTE — PREPROCEDURE INSTRUCTIONS
Medication List            Accurate as of March 18, 2025  8:56 AM. Always use your most recent med list.                albuterol 90 mcg/actuation inhaler  Medication Adjustments for Surgery: Take/Use as prescribed     atorvastatin 80 mg tablet  Commonly known as: Lipitor  Medication Adjustments for Surgery: Take/Use as prescribed     bimatoprost 0.01 % ophthalmic solution  Commonly known as: Lumigan  Administer 1 drop into both eyes once daily at bedtime.  Medication Adjustments for Surgery: Take/Use as prescribed     * chlorhexidine 4 % external liquid  Commonly known as: Hibiclens  Apply topically once daily for 5 days.  Medication Adjustments for Surgery: Take/Use as prescribed     * chlorhexidine 0.12 % solution  Commonly known as: Peridex  Use 15 mL in the mouth or throat once daily for 2 doses. 15 ML night before surgery and 15 ML morning of surgery. Swish and spit  Medication Adjustments for Surgery: Take/Use as prescribed     clonazePAM 1 mg tablet  Commonly known as: KlonoPIN  Medication Adjustments for Surgery: Take/Use as prescribed     cloNIDine 0.1 mg tablet  Commonly known as: Catapres  Medication Adjustments for Surgery: Take/Use as prescribed     dilTIAZem  mg 24 hr tablet  Commonly known as: Cardizem LA  Medication Adjustments for Surgery: Take/Use as prescribed     ezetimibe 10 mg tablet  Commonly known as: Zetia  Medication Adjustments for Surgery: Do Not take on the morning of surgery  Notes to patient: Last dose preoperatively 3/24/2025     Farxiga 5 mg  Generic drug: dapagliflozin propanediol  Medication Adjustments for Surgery: Take last dose 3 days before surgery  Notes to patient: Last dose preoperatively 3/21/2025     fluticasone 50 mcg/actuation nasal spray  Commonly known as: Flonase  Medication Adjustments for Surgery: Take/Use as prescribed     gabapentin 600 mg tablet  Commonly known as: Neurontin  Medication Adjustments for Surgery: Take/Use as prescribed     HumaLOG U-100  Insulin 100 unit/mL injection  Generic drug: insulin lispro  Medication Adjustments for Surgery: Do Not take on the morning of surgery  Notes to patient: Last dose preoperatively 3/24/2025     hydrALAZINE 50 mg tablet  Commonly known as: Apresoline  Take 0.5 tablets (25 mg) by mouth 3 times a day. *Please note change in dose  Medication Adjustments for Surgery: Take/Use as prescribed     Lantus U-100 Insulin 100 unit/mL injection  Generic drug: insulin glargine  Medication Adjustments for Surgery: Take/Use as prescribed     metoprolol tartrate 50 mg tablet  Commonly known as: Lopressor  Medication Adjustments for Surgery: Take/Use as prescribed     montelukast 10 mg tablet  Commonly known as: Singulair  Medication Adjustments for Surgery: Take/Use as prescribed     Ozempic 0.25 mg or 0.5 mg (2 mg/3 mL) pen injector  Generic drug: semaglutide  Medication Adjustments for Surgery: Do Not take on the morning of surgery  Notes to patient: Last dose preoperatively 3/24/2025     potassium chloride CR 20 mEq ER tablet  Commonly known as: Klor-Con M20  Medication Adjustments for Surgery: Take/Use as prescribed     primidone 50 mg tablet  Commonly known as: Mysoline  Take 2 tablets (100 mg) by mouth 2 times a day.  Medication Adjustments for Surgery: Take/Use as prescribed     torsemide 20 mg tablet  Commonly known as: Demadex  Take 1 tablet (20 mg) by mouth once daily.  Medication Adjustments for Surgery: Take/Use as prescribed     Trelegy Ellipta 200-62.5-25 mcg blister with device  Generic drug: fluticasone-umeclidin-vilanter  Medication Adjustments for Surgery: Take/Use as prescribed     warfarin 10 mg tablet  Commonly known as: Coumadin  Additional Medication Adjustments for Surgery: Other (Comment)  Notes to patient: Follow prescriber preoperative instructions           * This list has 2 medication(s) that are the same as other medications prescribed for you. Read the directions carefully, and ask your doctor or other  care provider to review them with you.                NPO Instructions:     Because you are on a GLP-1 medication per  preoperative protocol you are to hold solid food and non-clear liquids for 24 hours preoperatively. Clear liquids must be held 4 hours before your procedure. If you are diabetic please follow preoperative diabetic glucose management instructions below. If you have any further questions please contact our pre-admission testing department at [facility] at [phone].    Examples of clear liquids include black tea/coffee with no cream/sugar, water, apple juice, and electrolyte drinks (please avoid red drinks)  Additional Instructions:      Seven/Six Days before Surgery:  Review your medication instructions, stop indicated medications  Five Days before Surgery:  Review your medication instructions, stop indicated medications  Begin using your Hibiclens  Three Days before Surgery:  Review your medication instructions, stop indicated medications  The Day before Surgery:  Hold all solid food and non-clear liquids x 24 hours before your procedure  Hold clear liquids 4 hours before your procedure  No smoking or alcohol use 24 hours before surgery  Start using 0.12% CHG mouthwash  Review your medication instructions, stop indicated medications  You will be contacted regarding the time of your arrival to facility and surgery time  Do not eat any food after Midnight  Day of Surgery:  Review your medication instructions, take indicated medications  If you have diabetes, please check your fasting blood sugar upon awakening.  If fasting blood sugar is <80 mg/dl, drink 100 ml of apple juice, time limit of 2 hours before  Wear  comfortable loose fitting clothing  Do not use moisturizers, creams, lotions or perfume  All jewelry and valuables should be left at home     CONTACT SURGEON'S OFFICE IF YOU DEVELOP:  * Fever = 100.4 F   * New respiratory symptoms (e.g. cough, shortness of breath, respiratory distress, sore  throat)  * Recent loss of taste or smell  *Flu like symptoms such as headache, fatigue or gastrointestinal symptoms  * You develop any open sores, shingles, burning or painful urination   AND/OR:  * You no longer wish to have the surgery.  * Any other personal circumstances change that may lead to the need to cancel or defer this surgery.  *You were admitted to any hospital within one week of your planned procedure.     SMOKING:  *Quitting smoking can make a huge difference to your health and recovery from surgery.    *If you need help with quitting, call 9-005-QUIT-NOW.     THE DAY BEFORE SURGERY:  *Do not eat any food after midnight the night before your surgery.   SURGICAL TIME:  *You will be contacted between 2 p.m. and 3 p.m. the business day before your surgery with your arrival time.  *If you haven't received a call by 3pm, call [phone]  *Scheduled surgery times may change and you will be notified if this occurs-check your personal voicemail for any updates.     ON THE MORNING OF SURGERY:  *Wear comfortable, loose fitting clothing.   *Do not use moisturizers, creams, lotions or perfume.  *All jewelry and valuables should be left at home.  *Prosthetic devices such as contact lenses, hearing aids, dentures, eyelash extensions, hairpins and body piercing must be removed before surgery.     BRING WITH YOU:  *Photo ID and insurance card  *Current list of medications and allergies  *Pacemaker/Defibrillator/Heart stent cards  *CPAP machine and mask  *Slings/splints/crutches  *Copy of your complete Advanced Directive/DHPOA-if applicable  *Neurostimulator implant remote     PARKING AND ARRIVAL:  *Check in at the Main Entrance desk and let them know you are here for surgery.     IF YOU ARE HAVING OUTPATIENT/SAME DAY SURGERY:  *A responsible adult MUST accompany you at the time of discharge and stay with you for 24 hours after your surgery.  *You may NOT drive yourself home after surgery.  *You may use a taxi or  ride sharing service (Lyft, Uber) to return home ONLY if you are accompanied by a friend or family member.  *Instructions for resuming your medications will be provided by your surgeon.     Thank you for coming to Pre Admission testing.      If I have prescribed medication please don't forget to  at your pharmacy.      Any questions about today's visit call [phone] and leave a message in the general mailbox.     Patient instructed to ambulate as soon as possible postoperatively to decrease thromboembolic risk.     Terry Alan, APRN-CNP     Thank you for visiting the [facility].  If you have any changes to your health condition, please call the surgeons office to alert them and give them details of your symptoms.        Preoperative Fasting Guidelines     Why must I stop eating and drinking near surgery time?  With sedation, food or liquid in your stomach can enter your lungs causing serious complications  Increases nausea and vomiting     When do I need to stop eating and drinking before my surgery?  Do not eat any food/nonclear liquid for 24 hours prior to your surgery.  You can have clear liquids up until 4 hours before your surgery        Additional Instructions:      The Day before Surgery:  -Review your medication instructions, stop indicated medications  -You will be contacted in the evening regarding the time of your arrival to facility and surgery time     Day of Surgery:  -Review your medication instructions, take indicated medications  -Wear comfortable loose fitting clothing  -Do not use moisturizers, creams, lotions or perfume  -All jewelry and valuables should be left at home                   Preoperative Brain Exercises     What are brain exercises?  A brain exercise is any activity that engages your thinking (cognitive) skills.     What types of activities are considered brain exercises?  Jigsaw puzzles, crossword puzzles, word jumble, memory games, word search,  and many more.  Many can be found free online or on your phone via a mobile princess.     Why should I do brain exercises before my surgery?  More recent research has shown brain exercise before surgery can lower the risk of postoperative delirium (confusion) which can be especially important for older adults.  Patients who did brain exercises for 5 to 10 minutes/day the days before surgery, cut their risk of postoperative delirium in half up to 1 week after surgery.                         [facility]     Preoperative Deep Breathing Exercises     Why it is important to do deep breathing exercises before my surgery?  Deep breathing exercises strengthen your breathing muscles.  This helps you to recover after your surgery and decreases the chance of breathing complications.        How are the deep breathing exercises done?  Sit straight with your back supported.  Breathe in deeply and slowly through your nose. Your lower rib cage should expand and your abdomen may move forward.  Hold that breath for 3 to 5 seconds.  Breathe out through pursed lips, slowly and completely.  Rest and repeat 10 times every hour while awake.  Rest longer if you become dizzy or lightheaded.                      [facility]     Preoperative Deep Breathing Exercises     Why it is important to do deep breathing exercises before my surgery?  Deep breathing exercises strengthen your breathing muscles.  This helps you to recover after your surgery and decreases the chance of breathing complications.        How are the deep breathing exercises done?  Sit straight with your back supported.  Breathe in deeply and slowly through your nose. Your lower rib cage should expand and your abdomen may move forward.  Hold that breath for 3 to 5 seconds.  Breathe out through pursed lips, slowly and completely.  Rest and repeat 10 times every hour while awake.  Rest longer if you become dizzy or lightheaded.         Patient Information: Incentive Spirometer  What is an incentive spirometer?  An incentive spirometer is a device used before and after surgery to “exercise” your lungs.  It helps you to take deeper breaths to expand your lungs.  Below is an example of a basic incentive spirometer.  The device you receive may differ slightly but they all function the same.    Why do I need to use an incentive spirometer?  Using your incentive spirometer prepares your lungs for surgery and helps prevent lung problems after surgery.  How do I use my incentive spirometer?  When you're using your incentive spirometer, make sure to breathe through your mouth. If you breathe through your nose, the incentive spirometer won't work properly. You can hold your nose if you have trouble.  If you feel dizzy at any time, stop and rest. Try again at a later time.  Follow the steps below:  Set up your incentive spirometer, expand the flexible tubing and connect to the outlet.  Sit upright in a chair or bed. Hold the incentive spirometer at eye level.   Put the mouthpiece in your mouth and close your lips tightly around it. Slowly breathe out (exhale) completely.  Breathe in (inhale) slowly through your mouth as deeply as you can. As you take a breath, you will see the piston rise inside the large column. While the piston rises, the indicator should move upwards. It should stay in between the 2 arrows (see Figure).  Try to get the piston as high as you can, while keeping the indicator between the arrows.   If the indicator doesn't stay between the arrows, you're breathing either too fast or too slow.  When you get it as high as you can, hold your breath for 10 seconds, or as long as possible. While you're holding your breath, the piston will slowly fall to the base of the spirometer.  Once the piston reaches the bottom of the spirometer, breathe out slowly through your mouth. Rest for a few seconds.  Repeat 10 times. Try to get the piston to the  same level with each breath.  Repeat every hour while awake  You can carefully clean the outside of the mouthpiece with an alcohol wipe or soap and water.       Patient and Family Education             Ways You Can Help Prevent Blood Clots                    This handout explains some simple things you can do to help prevent blood clots.      Blood clots are blockages that can form in the body's veins. When a blood clot forms in your deep veins, it may be called a deep vein thrombosis, or DVT for short. Blood clots can happen in any part of the body where blood flows, but they are most common in the arms and legs. If a piece of a blood clot breaks free and travels to the lungs, it is called a pulmonary embolus (PE). A PE can be a very serious problem.         Being in the hospital or having surgery can raise your chances of getting a blood clot because you may not be well enough to move around as much as you normally do.         Ways you can help prevent blood clots in the hospital           Wearing SCDs. SCDs stands for Sequential Compression Devices.   SCDs are special sleeves that wrap around your legs  They attach to a pump that fills them with air to gently squeeze your legs every few minutes.   This helps return the blood in your legs to your heart.   SCDs should only be taken off when walking or bathing.   SCDs may not be comfortable, but they can help save your life.                                            Wearing compression stockings - if your doctor orders them. These special snug fitting stockings gently squeeze your legs to help blood flow.       Walking. Walking helps move the blood in your legs.   If your doctor says it is ok, try walking the halls at least   5 times a day. Ask us to help you get up, so you don't fall.      Taking any blood thinning medicines your doctor orders.        Page 1 of 2            [facility]; 3/23   Ways you can help prevent blood clots at home         Wearing  compression stockings - if your doctor orders them. ? Walking - to help move the blood in your legs.       Taking any blood thinning medicines your doctor orders.      Signs of a blood clot or PE        Tell your doctor or nurse know right away if you have of the problems listed below.    If you are at home, seek medical care right away. Call 911 for chest pain or problems breathing.          Signs of a blood clot (DVT) - such as pain,  swelling, redness or warmth in your arm or leg      Signs of a pulmonary embolism (PE) - such as chest pain or feeling short of breath    Patient Information: Pre-Operative Infection Prevention Measures     Why did I have my nose, under my arms, and groin swabbed?  The purpose of the swab is to identify Staphylococcus aureus inside your nose or on your skin.  The swab was sent to the laboratory for culture.  A positive swab/culture for Staphylococcus aureus is called colonization or carriage.      What is Staphylococcus aureus?  Staphylococcus aureus, also known as “staph”, is a germ found on the skin or in the nose of healthy people.  Sometimes Staphylococcus aureus can get into the body and cause an infection.  This can be minor (such as pimples, boils, or other skin problems).  It might also be serious (such as a blood infection, pneumonia, or a surgical site infection).    What is Staphylococcus aureus colonization or carriage?  Colonization or carriage means that a person has the germ but is not sick from it.  These bacteria can be spread on the hands or when breathing or sneezing.    How is Staphylococcus aureus spread?  It is most often spread by close contact with a person or item that carries it.    What happens if my culture is positive for Staphylococcus aureus?  Your doctor/medical team will use this information to guide any antibiotic treatment which may be necessary.  Regardless of the culture results, we will clean the inside of your nose with a betadine swab just  before you have your surgery.      Will I get an infection if I have Staphylococcus aureus in my nose or on my skin?  Anyone can get an infection with Staphylococcus aureus.  However, the best way to reduce your risk of infection is to follow the instructions provided to you for the use of your CHG soap and dental rinse.        Patient Information: Oral/Dental Rinse    What is oral/dental rinse?   It is a mouthwash. It is a way of cleaning the mouth with a germ-killing solution before your surgery.  The solution contains chlorhexidine, commonly known as CHG.   It is used inside the mouth to kill a bacteria known as Staphylococcus aureus.  Let your doctor know if you are allergic to Chlorhexidine.    Why do I need to use CHG oral/dental rinse?  The CHG oral/dental rinse helps to kill a bacteria in your mouth known as Staphylococcus aureus.     This reduces the risk of infection at the surgical site.      Using your CHG oral/dental rinse  STEPS:  Use your CHG oral/dental rinse after you brush your teeth the night before (at bedtime) and the morning of your surgery.  Follow all directions on your prescription label.    Use the cap on the container to measure 15ml   Swish (gargle if you can) the mouthwash in your mouth for at least 30 seconds, (do not swallow) and spit out  After you use your CHG rinse, do not rinse your mouth with water, drink or eat.  Please refer to the prescription label for the appropriate time to resume oral intake      What side effects might I have using the CHG oral/dental rinse?  CHG rinse will stick to plaque on the teeth.  Brush and floss just before use.  Teeth brushing will help avoid staining of plaque during use.    We have sent a prescription for CHG 0.12% mouthwash to your preferred pharmacy.  If you have not already, Please  your prescription and start using five days before surgery.  Follow the instruction sheet provided to you at your CPM/PAT appointment.  Please contact [facility]  PAT if you do not receive your CHG mouthwash prescription.      Patient Information: Home Preoperative Antibacterial Shower      What is a home preoperative antibacterial shower?  This shower is a way of cleaning the skin with a germ-killing solution before surgery.  The solution contains chlorhexidine, commonly known as CHG.  CHG is a skin cleanser with germ-killing ability.  Let your doctor know if you are allergic to chlorhexidine.    Why do I need to take a preoperative antibacterial shower?  Skin is not sterile.  It is best to try to make your skin as free of germs as possible before surgery.  Proper cleansing with a germ-killing soap before surgery can lower the number of germs on your skin.  This helps to reduce the risk of infection at the surgical site.  Following the instructions listed below will help you prepare your skin for surgery.      How do I use the solution?  Steps:  Begin using your CHG soap 5 days before your scheduled surgery on 3/20/3035.    First, wash and rinse your hair using the CHG soap. Keep CHG soap away from ear canals and eyes.  Rinse completely, do not condition.  Hair extensions should be removed.  Wash your face with your normal soap and rinse.    Apply the CHG solution to a clean wet washcloth.  Turn the water off or move away from the water spray to avoid premature rinsing of the CHG soap as you are applying.   Firmly lather your entire body from the neck down.  Do not use on your face.  Pay special attention to the area(s) where your incision(s) will be located unless they are on your face.  Avoid scrubbing your skin too hard.  The important point is to have the CHG soap sit on your skin for 3 minutes.    When the 3 minutes are up, turn on the water and rinse the CHG solution off your body completely.   DO NOT wash with regular soap after you have used the CHG soap solution  Pat yourself dry with a clean, freshly-laundered towel.  DO NOT apply powders, deodorants, or  lotions.  Dress in clean, freshly laundered nightclothes.    Be sure to sleep with clean, freshly laundered sheets.  Be aware that CHG will cause stains on fabrics; if you wash them with bleach after use.  Rinse your washcloth and other linens that have contact with CHG completely.  Use only non-chlorine detergents to launder the items used.   The morning of surgery is the fifth day.  Repeat the above steps and dress in clean comfortable clothing     Whom should I contact if I have any questions regarding the use of CHG soap?  Call the [facility] at [phone] if you have any questions.

## 2025-03-18 NOTE — CPM/PAT H&P
CPM/PAT Evaluation       Name: Lia Jeong (Lia Jeong)  /Age: 1950/75 y.o.     In-Person       Chief Complaint: Compression fracture of L3 vertebra with delayed healing, subsequent encounter     HPI  Patient is an alert and oriented 75 year old female scheduled for a verterbroplasty lumbar spine L3 on 3/25/2025 with Dr. Colon for compression fracture of L3 vertebra with delayed healing, subsequent encounter. She reports mid to low back pain that she currently rates at a 2/10 which worsens during ambulation and activity. PMHX includes obesity, HTN, HLD, asthma/COPD, PE, T2DM, and RIANNA. Presents to [facility] PAT today for perioperative risk stratification and optimization.     Past Medical History:   Diagnosis Date    Asthma     Cataract     CHF (congestive heart failure)     Clotting disorder (Multi)     Diabetes mellitus (Multi)     GERD (gastroesophageal reflux disease)     Hyperlipidemia     Hypertension     Peripheral neuropathy     Pulmonary embolus     Sleep apnea      Past Surgical History:   Procedure Laterality Date    CATARACT EXTRACTION      EYE SURGERY  2015    Eye Surgery    MR HEAD ANGIO WO IV CONTRAST  2022    MR HEAD ANGIO WO IV CONTRAST 2022 [facility]    MR NECK ANGIO WO IV CONTRAST  2022    MR NECK ANGIO WO IV CONTRAST 2022 [facility]    TONSILLECTOMY  2017    Tonsillectomy    TUBAL LIGATION  2017    Tubal Ligation     Patient Sexual activity questions deferred to the physician.    Family History   Problem Relation Name Age of Onset    Hypertension Mother      Hyperlipidemia Mother      Diabetes Mother      Breast cancer Brother       Allergies   Allergen Reactions    Amoxicillin Diarrhea    Amoxicillin-Pot Clavulanate Diarrhea     SEVERE DIARRHEA    Dapagliflozin Rash     Yeast infect in perineal area    Hydrochlorothiazide Other     DECREASE SEXUAL LIBIDO    Low libido    Lisinopril Cough     COUGH    Losartan Cough     Prior to Admission  medications    Medication Sig Start Date End Date Taking? Authorizing Provider   atorvastatin (Lipitor) 80 mg tablet Take 1 tablet (80 mg) by mouth once daily. 7/31/14  Yes Historical Provider, MD   bimatoprost (Lumigan) 0.01 % ophthalmic solution Administer 1 drop into both eyes once daily at bedtime. 1/28/25 4/28/25 Yes Beryl Boothe MD   clonazePAM (KlonoPIN) 1 mg tablet Take 1 tablet (1 mg) by mouth every 8 hours if needed. 1/28/25 7/27/25 Yes Historical Provider, MD   cloNIDine (Catapres) 0.1 mg tablet Take 1 tablet (0.1 mg) by mouth twice a day. 11/14/14  Yes Historical Provider, MD   dilTIAZem LA (Cardizem LA) 180 mg 24 hr tablet Take 1 tablet (180 mg) by mouth once daily.   Yes Historical Provider, MD   ezetimibe (Zetia) 10 mg tablet Take 1 tablet (10 mg) by mouth once daily.   Yes Historical Provider, MD   Farxiga 5 mg Take 1 tablet (5 mg) by mouth once daily with breakfast. 4/4/23  Yes Historical Provider, MD   gabapentin (Neurontin) 600 mg tablet Take 2 tablets (1,200 mg) by mouth 3 times a day. 12/29/17 3/18/25 Yes Historical Provider, MD   HumaLOG U-100 Insulin 100 unit/mL injection Inject 10 Units under the skin 3 times a day before meals. 10 units after each meal 8/20/24  Yes Historical Provider, MD   hydrALAZINE (Apresoline) 50 mg tablet Take 0.5 tablets (25 mg) by mouth 3 times a day. *Please note change in dose 10/5/23 3/18/25 Yes Mima Silva, APRN-CNP   Lantus U-100 Insulin 100 unit/mL injection INJECT 50 UNITS SUBCUTANEOUSLY EVERY MORNING   Yes Historical Provider, MD   metoprolol tartrate (Lopressor) 50 mg tablet Take 1 tablet by mouth 2 times a day. 5/19/22  Yes Historical Provider, MD   Ozempic 0.25 mg or 0.5 mg (2 mg/3 mL) pen injector Last taken 1/27   Yes Historical Provider, MD   potassium chloride CR 20 mEq ER tablet TAKE TWO TABLETS (40 MEQ) BY MOUTH DAILY WITH FOOD 10/6/24  Yes Historical Provider, MD Laure Hill 200-62.5-25 mcg blister with device INHALE ONE PUFF BY MOUTH  EVERY MORNING 7/1/24  Yes Historical Provider, MD   warfarin (Coumadin) 10 mg tablet Take 1 tablet (10 mg) by mouth. Take as directed per After Visit Summary. Pt takes Tuesday, Thursday, Saturday and Sunday.    Last taken 2/2   Yes Historical Provider, MD   albuterol 90 mcg/actuation inhaler inhale two puffs by mouth every 6 hours as needed 1/3/25   Historical Provider, MD   chlorhexidine (Hibiclens) 4 % external liquid Apply topically once daily for 5 days. 3/18/25 3/23/25  WINNIE Paul   chlorhexidine (Peridex) 0.12 % solution Use 15 mL in the mouth or throat once daily for 2 doses. 15 ML night before surgery and 15 ML morning of surgery. Swish and spit 3/18/25 3/20/25  WINNIE Paul   fluticasone (Flonase) 50 mcg/actuation nasal spray Administer 1 spray into each nostril once daily. 5/8/14   Historical Provider, MD   montelukast (Singulair) 10 mg tablet Take 1 tablet (10 mg) by mouth once daily at bedtime. 8/11/14   Historical Provider, MD   primidone (Mysoline) 50 mg tablet Take 2 tablets (100 mg) by mouth 2 times a day.  Patient not taking: No sig reported 12/18/23 4/16/24  Johnathan Buenrostro MD   torsemide (Demadex) 20 mg tablet Take 1 tablet (20 mg) by mouth once daily. 10/5/23 11/4/23  WINNIE Verdin   clonazePAM (KlonoPIN) 0.5 mg tablet  1/24/25 3/18/25  Historical Provider, MD   DULoxetine (Cymbalta) 30 mg DR capsule Take 1 capsule (30 mg) by mouth once daily. 6/5/23 3/18/25  Historical Provider, MD   insulin NPH and regular human (NovoLIN) 100 unit/mL (70-30) injection Inject 45 Units under the skin 2 times a day before meals. 9/12/23 3/18/25  Historical Provider, MD       Review of Systems   Constitutional: Negative for chills, decreased appetite, diaphoresis, fever and malaise/fatigue.   Eyes:  Negative for blurred vision and double vision.   Cardiovascular:  Negative for chest pain, claudication, cyanosis, dyspnea on exertion, irregular heartbeat, leg swelling,  near-syncope and palpitations.   Respiratory:  Negative for cough, hemoptysis, shortness of breath and wheezing.    Endocrine: Negative for cold intolerance, heat intolerance, polydipsia, polyphagia and polyuria.   Gastrointestinal:  Negative for abdominal pain, constipation, diarrhea, dysphagia, nausea and vomiting.   Genitourinary:  Negative for bladder incontinence, dysuria, hematuria, incomplete emptying, nocturia, frequency, pelvic pain and urgency.   Neurological:  Negative for headaches, light-headedness, paresthesias, sensory change and weakness.   Psychiatric/Behavioral:  Negative for altered mental status.    Musculoskeletal: Negative for myalgias, arthralgias. Positive for back pain.      Vitals and nursing note reviewed.     Physical exam  Constitutional:       Appearance: Normal appearance. She is Obese.   HENT:      Head: Normocephalic and atraumatic.      Mouth/Throat:      Mouth: Mucous membranes are moist.      Pharynx: Oropharynx is clear.   Eyes:      Extraocular Movements: Extraocular movements intact.      Conjunctiva/sclera: Conjunctivae normal.      Pupils: Pupils are equal, round, and reactive to light.   Cardiovascular:      PMI at left midclavicular line. Normal rate. Regular rhythm. Normal S1. Normal S2.       Murmurs: There is no murmur.      No gallop.  No click. No rub.       No audible carotid bruit     No lower extremity edema on exam  Pulmonary:      Effort: Pulmonary effort is normal.      Breath sounds: Normal breath sounds.   Abdominal:      General: Abdomen is flat. Bowel sounds are normal.      Palpations: Abdomen is soft and non-tender  Musculoskeletal:      Cervical back: Normal range of motion and neck supple.   Skin:     General: Skin is warm and dry.      Capillary Refill: Capillary refill takes less than 2 seconds.   Neurological:      General: No focal deficit present.      Mental Status: She is alert and oriented to person, place, and time. Mental status is at baseline.  "  Psychiatric:         Mood and Affect: Mood normal.         Behavior: Behavior normal.         Thought Content: Thought content normal.         Judgment: Judgment normal.     Vitals and nursing note reviewed. Physical exam within normal limits.     Visit Vitals  /58   Pulse 76   Temp 36.6 °C (97.9 °F) (Temporal)   Resp 16   Ht 1.651 m (5' 5\")   Wt 95 kg (209 lb 8 oz)   SpO2 96%   BMI 34.86 kg/m²   OB Status Postmenopausal   Smoking Status Never   BSA 2.09 m²     DASI Risk Score      Flowsheet Row Pre-Admission Testing from 3/18/2025 in [facility]   Can you take care of yourself (eat, dress, bathe, or use toilet)?  2.75 filed at 03/18/2025 0921   Can you walk indoors, such as around your house? 1.75 filed at 03/18/2025 0921   Can you walk a block or two on level ground?  2.75 filed at 03/18/2025 0921   Can you climb a flight of stairs or walk up a hill? 5.5 filed at 03/18/2025 0921   Can you run a short distance? 0 filed at 03/18/2025 0921   Can you do light work around the house like dusting or washing dishes? 2.7 filed at 03/18/2025 0921   Can you do moderate work around the house like vacuuming, sweeping floors or carrying groceries? 3.5 filed at 03/18/2025 0921   Can you do heavy work around the house like scrubbing floors or lifting and moving heavy furniture?  0 filed at 03/18/2025 0921   Can you do yard work like raking leaves, weeding or pushing a mower? 0 filed at 03/18/2025 0921   Can you have sexual relations? 5.25 filed at 03/18/2025 0921   Can you participate in moderate recreational activities like golf, bowling, dancing, doubles tennis or throwing a baseball or football? 6 filed at 03/18/2025 0921   Can you participate in strenous sports like swimming, singles tennis, football, basketball, or skiing? 0 filed at 03/18/2025 0921   DASI SCORE 30.2 filed at 03/18/2025 0921   METS Score (Will be calculated only when all the questions are answered) 6.5 filed at 03/18/2025 0921      "     Caprini DVT Assessment      Flowsheet Row Pre-Admission Testing from 3/18/2025 in [facility] ED to Hosp-Admission (Discharged) from 9/30/2023 in [facility] Bldg A 5 with Ghazal Gray MD and Joshua Parekh MD   DVT Score (IF A SCORE IS NOT CALCULATING, MUST SELECT A BMI TO COMPLETE) 16 filed at 03/18/2025 0920 4 filed at 09/30/2023 1652   Medical Factors COPD, Congestive Heart Failure < 1 month, History of DVT/PE filed at 03/18/2025 0920 --   Surgical Factors Major surgery planned, lasting 2-3 hours filed at 03/18/2025 0920 --   Labs/Test Results Elevated Anticardiolipin Antibodies filed at 03/18/2025 0920 --   BMI (BMI MUST BE CHOSEN) 31-40 (Obesity) filed at 03/18/2025 0920 31-40 (Obesity) filed at 09/30/2023 1652   RETIRED: Current Status -- Swollen legs filed at 09/30/2023 1652   RETIRED: Age -- 60-75 years filed at 09/30/2023 1652          Modified Frailty Index      Flowsheet Row Pre-Admission Testing from 3/18/2025 in [facility]   Non-independent functional status (problems with dressing, bathing, personal grooming, or cooking) 0 filed at 03/18/2025 0921   History of diabetes mellitus  0.0909 filed at 03/18/2025 0921   History of COPD 0.0909 filed at 03/18/2025 0921   History of CHF 0.0909 filed at 03/18/2025 0921   History of MI 0 filed at 03/18/2025 0921   History of Percutaneous Coronary Intervention, Cardiac Surgery, or Angina No filed at 03/18/2025 0921   Hypertension requiring the use of medication  0.0909 filed at 03/18/2025 0921   Peripheral vascular disease 0 filed at 03/18/2025 0921   Impaired sensorium (cognitive impairement or loss, clouding, or delirium) 0 filed at 03/18/2025 0921   TIA or CVA withouy residual deficit 0 filed at 03/18/2025 0921   Cerebrovascular accident with deficit 0 filed at 03/18/2025 0921   Modified Frailty Index Calculator .3636 filed at 03/18/2025 0921          [id number] Stroke Risk Points  Current as of  just now        N/A 0 to 9 Points:      Last Change: N/A          The [id number] risk score (Yvan PRADO, et al. 2009. © 2010 [employer]) quantifies the risk of stroke for a patient with atrial fibrillation. For patients without atrial fibrillation or under the age of 18 this score appears as N/A. Higher score values generally indicate higher risk of stroke.        This score is not applicable to this patient. Components are not calculated.          Revised Cardiac Risk Index      Flowsheet Row Pre-Admission Testing from 3/18/2025 in [facility]   High-Risk Surgery (Intraperitoneal, Intrathoracic,Suprainguinal vascular) 0 filed at 03/18/2025 0921   History of ischemic heart disease (History of MI, History of positive exercuse test, Current chest paint considered due to myocardial ischemia, Use of nitrate therapy, ECG with pathological Q Waves) 0 filed at 03/18/2025 0921   History of congestive heart failure (pulmonary edemia, bilateral rales or S3 gallop, Paroxysmal nocturnal dyspnea, CXR showing pulmonary vascular redistribution) 1 filed at 03/18/2025 0921   History of cerebrovascular disease (Prior TIA or stroke) 0 filed at 03/18/2025 0921   Pre-operative insulin treatment 1 filed at 03/18/2025 0921   Pre-operative creatinine>2 mg/dl 0 filed at 03/18/2025 0921   Revised Cardiac Risk Calculator 2 filed at 03/18/2025 0921          Apfel Simplified Score    No data to display       Risk Analysis Index Results This Encounter    No data found in the last 10 encounters.       Stop Bang Score      Flowsheet Row Pre-Admission Testing from 3/18/2025 in [facility]   Do you snore loudly? 1 filed at 03/18/2025 0840   Do you often feel tired or fatigued after your sleep? 0 filed at 03/18/2025 0840   Has anyone ever observed you stop breathing in your sleep? 0 filed at 03/18/2025 0840   Do you have or are you being treated for high blood pressure? 1 filed at 03/18/2025  0840   Recent BMI (Calculated) 34.9 filed at 03/18/2025 0840   Is BMI greater than 35 kg/m2? 0=No filed at 03/18/2025 0840   Age older than 50 years old? 1=Yes filed at 03/18/2025 0840   Is your neck circumference greater than 17 inches (Male) or 16 inches (Female)? 0 filed at 03/18/2025 0840   Gender - Male 0=No filed at 03/18/2025 0840   STOP-BANG Total Score 3 filed at 03/18/2025 0840          Prodigy: High Risk  Total Score: 19              Prodigy Age Score      Prodigy CHF score          ARISCAT Score for Postoperative Pulmonary Complications      Flowsheet Row Pre-Admission Testing from 3/18/2025 in [facility]   Age Calculated Score 3 filed at 03/18/2025 0921   Preoperative SpO2 0 filed at 03/18/2025 0921   Respiratory infection in the last month Either upper or lower (i.e., URI, bronchitis, pneumonia), with fever and antibiotic treatment 0 filed at 03/18/2025 0921   Preoperative anemia (Hgb less than 10 g/dl) 0 filed at 03/18/2025 0921   Surgical incision  0 filed at 03/18/2025 0921   Duration of surgery  16 filed at 03/18/2025 0921   Emergency Procedure  0 filed at 03/18/2025 0921   ARISCAT Total Score  19 filed at 03/18/2025 0921          Darell Perioperative Risk for Myocardial Infarction or Cardiac Arrest (GENE)      Flowsheet Row Pre-Admission Testing from 3/18/2025 in [facility]   Calculated Age Score 1.5 filed at 03/18/2025 0922   Functional Status  0 filed at 03/18/2025 0922   ASA Class  -3.29 filed at 03/18/2025 0922   Creatinine 0 filed at 03/18/2025 0922   Type of Procedure  0.21 filed at 03/18/2025 0922   GENE Total Score  -6.83 filed at 03/18/2025 0922   GENE % 0.11 filed at 03/18/2025 0922          Assessment & Plan:    Neuro:  No diagnosis or significant findings on chart review or clinical presentation and evaluation.     HEENT/Airway:  No significant findings on chart review or clinical presentation and evaluation.   History of Obstructive sleep apnea-Managed  with CPAP. Compliant   STOP-BANG Score-3 points moderate risk for RIANNA    Mallampati::  III    TM distance::  >3 FB    Neck ROM::  Full  Dentures-denies  Crowns-denies  Implants-denies    Cardiovascular:  No significant findings on chart review or clinical presentation and evaluation.   History of Congestive heart failure-Managed with Torsemide and Metoprolol. Patient euvolemic and asymptomatic. Last echo completed 5/18/2022. LVEF 60-65%. Will screen with BNP in PAT. BNP-26  History of Hypertension-Managed with Metoprolol, Hydralazine, Diltiazem, and Clonidine. BP in PAT was 113/58.   History of Hyperlipidemia-Managed with Atorvastatin  History of Pulmonary embolism-Managed with Warfarin. Unprovoked PE 1994. INR-2.4  METS: 6.5  RCRI: 2 points, 10.1% risk for postoperative MACE   GENE: 0.11% risk for postoperative MACE  EKG -normal sinus rhythm and nonspecific ST and T wave changes Rate-77 No acute changes.   Transthoracic echocardiogram-Completed 5/18/2022   1. The left ventricular systolic function is normal with a 60-65% estimated ejection fraction.   2. Spectral Doppler shows an impaired relaxation pattern of left ventricular diastolic filling.   3. There is an elevated mean left atrial pressure.   4. There is moderate mitral valve stenosis.   5. Moderately decreased mitral valve posterior leaflet mobility.   6. Mild aortic valve stenosis.     Pulmonary:  No significant findings on chart review or clinical presentation and evaluation.   History of Chronic obstructive pulmonary disease/Asthma-Managed with Albuterol, Montelukast, and Trelegy. LS diminished but CTAB. Resting SPO2 96% RA.   ARISCAT: <26 points, 1.6% risk of in-hospital postoperative pulmonary complication  PRODIGY: High risk for opioid induced respiratory depression  Smoking History-She has never smoked.  Discussed smoking cessation and deep breathing handout given    Renal/Urinary:  No diagnosis or significant findings on chart review or clinical  presentation and evaluation, however, the patient is at increased risk of perioperative renal complications secondary to age>/= 56, HTN, and diabetes. Preventative measures include BP monitoring, medication compliance, and hydration management.   CMP-Reviewed, stable  Creatinine-0.85  GFR-72    Endocrine:  No significant findings on chart review or clinical presentation and evaluation.   History of Type 2 diabetes mellitus-Managed with Farxiga, Humalog, Lantus, and Ozempic.   ZQE1O-1.7%    Hematologic/Immunology:  No diagnosis or significant findings on chart review or clinical presentation and evaluation.  The patient is not a Jehovah’s witness and will accept blood and blood products if medically indicated.   History of previous blood transfusions No  CBC-Reviewed, stable  HGB-13.4  Caprini Score 16, patient at High for postoperative DVT. Pt supplied education/VTE handout  Anticoagulation use: Yes   Coumadin-PCP will not approve preoperative DC of Warfarin. Surgery will be cancelled at this time per Dr Colon.     Gastrointestinal:   No diagnosis or significant findings on chart review or clinical presentation and evaluation.   Recreational drug use: none  Alcohol use none    Infectious disease:   No diagnosis on chart review or clinical presentation and evaluation.   Prescription provided for CHG body wash and dental rinse. CHG use instructions reviewed and provided to patient.  Staph screen collected-Negative    Musculoskeletal:   No diagnosis on chart review or clinical presentation and evaluation.   Positive for Obesity-BMI 34.86  JHFRAT score-12 points. moderate risk for falls    Anesthesia:  ASA 2 - Patient with mild systemic disease with no functional limitations  Anticipated anesthesia-general  History of General anesthesia- yes  Complications- No anesthesia complications  No family history of anesthesia complications    Labs & Imaging ordered:  CBC, CMP, HBA1C, MRSA, EKG, BNP  Nickel/metal  allergy-negative  Shellfish allergy-negative    Overall, patient Low Risk for the scheduled Moderate Risk surgery. Discussed with patient medication instructions, NPO guidelines, and any questions or concerns.     Face to face time spent 45 minutes

## 2025-03-20 LAB — STAPHYLOCOCCUS SPEC CULT: NORMAL

## 2025-03-21 ENCOUNTER — TELEPHONE (OUTPATIENT)
Dept: HEMATOLOGY/ONCOLOGY | Facility: HOSPITAL | Age: 75
End: 2025-03-21
Payer: MEDICARE

## 2025-03-21 LAB
ATRIAL RATE: 77 BPM
P AXIS: 44 DEGREES
P OFFSET: 181 MS
P ONSET: 125 MS
PR INTERVAL: 196 MS
Q ONSET: 223 MS
QRS COUNT: 12 BEATS
QRS DURATION: 72 MS
QT INTERVAL: 396 MS
QTC CALCULATION(BAZETT): 448 MS
QTC FREDERICIA: 430 MS
R AXIS: 6 DEGREES
T AXIS: 50 DEGREES
T OFFSET: 421 MS
VENTRICULAR RATE: 77 BPM

## 2025-03-21 NOTE — TELEPHONE ENCOUNTER
RN called patient and left a message on voice mail asking patient to call back about a benign heme appointment for surgical clearance. Call back number reviewed.

## 2025-03-26 NOTE — PROGRESS NOTES
Patient ID: Lia Jeong is a 75 y.o. female.  Referring Physician: Ghazal Gray MD   [address]  [place] [address]  Primary Care Provider: Thalia Gray MD    HPI:  This is a 75 year old woman who was referred for management of warfarin anticoagulation prior to vertebroplasty procedure.     In Feb 2025 she was dx'd with an L3 compression fracture. Plans are for vertebroplasty.  She has been followed by [facility] at [facility] last on 3/24/25.     She was dx'd with a PE in 1994 after a long plane ride from [place]; has not had any further thrombotic episodes (per pt and ) ever since then. She had a low prob V/Q in 9/2022 and negative bilateral LE dopplers in 9/2024. Has also been off coumadin (w/o bridging) for procedures in the past w/o development of any thromboses, most recently for a colonoscopy w/o problems.    ROS  Pt denies having had unexplained wt loss, fevers, chills, sweats, palpable TYE or breast masses, cough, nausea, vomiting, diarrhea, mouth sores, skin rashes, chest/abd/back pains, neuropathic symptoms, headaches, nosebleeds, GI,  or GYN bleeding, vision or hearing complaints or feeling depressed.    PMHx:  1) PE (in 1994 after a long plane ride from [place]; low prob V/Q in 9/2022; negative bilateral LE dopplers in 9/2024)    2) APLS??  3) HFpEF  4) HTN  5) COPD  6) RIANNA      SocHx:   w/2 kids. Retired . No hx of cigarette, EtOH or illicit drug use.     FamHx:  No hx of clots or malignancies    Meds:  Were reviewed    All:  AMOXICILLIN  Hydrochlorothiazide  LISINOPRIL (COUGH)  DAPAGLIFLOZIN  LOSARTAN    Physical Exam  General: Ambulatory, looked comfortable, not requiring supplemental oxygen    Vital Signs   /59; P 79/min; Afebrile; Wt 95.3 kg    HEENT: no oral lesions, tonsillar or tongue enlargement; Neck: no masses; Lymph nodes: no palpable cervical, supraclavicular, axillary, epitrochear or  inguinal nodes; Heart: no murmurs; Lungs: clear; Abd: soft, +bowel sounds, non-tender, no palpable liver or spleen; Skin: no rashes; Ext's: No LE edema; Neuro: alert, answered questions appropriately, 5/5 motor strength upper and lower extremities    Performance Status:  Symptomatic; fully ambulatory      Assessment/recommendations:  75 year old woman w/a hx of DM, HTN, RIANNA, COPD, HFpEF and a remote pulmonary embolism (in 1994 after a long plane ride from [place]; low prob V/Q in 9/2022; negative bilateral LE dopplers in 9/2024) who was referred for evaluation of anticoagulation prior to upcoming vertebroplasty procedure for an L3 compression fracture.    She previously has also been off coumadin (w/o bridging) for procedures in the past w/o development of any thromboses, most recently for a colonoscopy w/o problems. I recommended holding the coumadin x at least 5 days prior to the planned elective procedure, without need for bridging, then (if no complications) may restart coumadin the next day.     Will also check anticardiolipin Abs and beta-2 glycoprotein Abs, and obtain more detailed [facility] records, to clarify if she has APLS or not.    May follow-up on a prn basis.

## 2025-03-27 ENCOUNTER — OFFICE VISIT (OUTPATIENT)
Dept: HEMATOLOGY/ONCOLOGY | Facility: HOSPITAL | Age: 75
End: 2025-03-27
Payer: MEDICARE

## 2025-03-27 ENCOUNTER — LAB (OUTPATIENT)
Dept: LAB | Facility: HOSPITAL | Age: 75
End: 2025-03-27
Payer: MEDICARE

## 2025-03-27 VITALS
HEART RATE: 79 BPM | TEMPERATURE: 96.4 F | HEIGHT: 64 IN | OXYGEN SATURATION: 97 % | DIASTOLIC BLOOD PRESSURE: 59 MMHG | SYSTOLIC BLOOD PRESSURE: 110 MMHG | BODY MASS INDEX: 35.85 KG/M2 | WEIGHT: 210 LBS

## 2025-03-27 DIAGNOSIS — Z86.718 H/O BLOOD CLOTS: ICD-10-CM

## 2025-03-27 DIAGNOSIS — I26.99 PULMONARY EMBOLISM, UNSPECIFIED CHRONICITY, UNSPECIFIED PULMONARY EMBOLISM TYPE, UNSPECIFIED WHETHER ACUTE COR PULMONALE PRESENT (MULTI): ICD-10-CM

## 2025-03-27 LAB
B2 GLYCOPROT1 IGA SER-ACNC: <0.6 U/ML
B2 GLYCOPROT1 IGG SER-ACNC: <1.4 U/ML
B2 GLYCOPROT1 IGM SER-ACNC: 0.6 U/ML
CARDIOLIPIN IGA SERPL-ACNC: <0.5 APL U/ML
CARDIOLIPIN IGG SER IA-ACNC: <1.6 GPL U/ML
CARDIOLIPIN IGM SER IA-ACNC: 0.6 MPL U/ML
HOLD SPECIMEN: NORMAL

## 2025-03-27 PROCEDURE — 1126F AMNT PAIN NOTED NONE PRSNT: CPT | Performed by: INTERNAL MEDICINE

## 2025-03-27 PROCEDURE — 99215 OFFICE O/P EST HI 40 MIN: CPT | Mod: 25 | Performed by: INTERNAL MEDICINE

## 2025-03-27 PROCEDURE — 3074F SYST BP LT 130 MM HG: CPT | Performed by: INTERNAL MEDICINE

## 2025-03-27 PROCEDURE — 99205 OFFICE O/P NEW HI 60 MIN: CPT | Performed by: INTERNAL MEDICINE

## 2025-03-27 PROCEDURE — 86147 CARDIOLIPIN ANTIBODY EA IG: CPT

## 2025-03-27 PROCEDURE — 1159F MED LIST DOCD IN RCRD: CPT | Performed by: INTERNAL MEDICINE

## 2025-03-27 PROCEDURE — 3051F HG A1C>EQUAL 7.0%<8.0%: CPT | Performed by: INTERNAL MEDICINE

## 2025-03-27 PROCEDURE — 86146 BETA-2 GLYCOPROTEIN ANTIBODY: CPT

## 2025-03-27 PROCEDURE — 3078F DIAST BP <80 MM HG: CPT | Performed by: INTERNAL MEDICINE

## 2025-03-27 PROCEDURE — 1157F ADVNC CARE PLAN IN RCRD: CPT | Performed by: INTERNAL MEDICINE

## 2025-03-27 PROCEDURE — 36415 COLL VENOUS BLD VENIPUNCTURE: CPT

## 2025-03-27 ASSESSMENT — PATIENT HEALTH QUESTIONNAIRE - PHQ9
2. FEELING DOWN, DEPRESSED OR HOPELESS: SEVERAL DAYS
1. LITTLE INTEREST OR PLEASURE IN DOING THINGS: SEVERAL DAYS
SUM OF ALL RESPONSES TO PHQ9 QUESTIONS 1 AND 2: 2
10. IF YOU CHECKED OFF ANY PROBLEMS, HOW DIFFICULT HAVE THESE PROBLEMS MADE IT FOR YOU TO DO YOUR WORK, TAKE CARE OF THINGS AT HOME, OR GET ALONG WITH OTHER PEOPLE: NOT DIFFICULT AT ALL

## 2025-03-27 ASSESSMENT — ENCOUNTER SYMPTOMS
OCCASIONAL FEELINGS OF UNSTEADINESS: 1
DEPRESSION: 0
LOSS OF SENSATION IN FEET: 1

## 2025-03-27 ASSESSMENT — PAIN SCALES - GENERAL: PAINLEVEL_OUTOF10: 0-NO PAIN

## 2025-04-15 ENCOUNTER — APPOINTMENT (OUTPATIENT)
Dept: OPHTHALMOLOGY | Facility: CLINIC | Age: 75
End: 2025-04-15
Payer: MEDICARE

## 2025-05-06 ENCOUNTER — APPOINTMENT (OUTPATIENT)
Dept: OPHTHALMOLOGY | Facility: CLINIC | Age: 75
End: 2025-05-06
Payer: COMMERCIAL

## 2025-05-06 DIAGNOSIS — H26.493 PCO (POSTERIOR CAPSULAR OPACIFICATION), BILATERAL: ICD-10-CM

## 2025-05-06 DIAGNOSIS — D31.31 NEVUS OF CHOROID OF RIGHT EYE: ICD-10-CM

## 2025-05-06 DIAGNOSIS — Z96.1 PSEUDOPHAKIA: ICD-10-CM

## 2025-05-06 DIAGNOSIS — H04.123 DRY EYES: ICD-10-CM

## 2025-05-06 DIAGNOSIS — E11.3393 MODERATE NONPROLIFERATIVE DIABETIC RETINOPATHY OF BOTH EYES WITHOUT MACULAR EDEMA ASSOCIATED WITH TYPE 2 DIABETES MELLITUS: ICD-10-CM

## 2025-05-06 DIAGNOSIS — D31.32 CHOROIDAL NEVUS, LEFT EYE: ICD-10-CM

## 2025-05-06 DIAGNOSIS — H40.1131 PRIMARY OPEN ANGLE GLAUCOMA (POAG) OF BOTH EYES, MILD STAGE: Primary | ICD-10-CM

## 2025-05-06 PROCEDURE — 92012 INTRM OPH EXAM EST PATIENT: CPT | Performed by: OPHTHALMOLOGY

## 2025-05-06 ASSESSMENT — VISUAL ACUITY
METHOD: SNELLEN - LINEAR
OS_CC: 20/25-2
OD_CC: 20/25-1

## 2025-05-06 ASSESSMENT — TONOMETRY
OS_IOP_MMHG: 15
IOP_METHOD: GOLDMANN APPLANATION
OD_IOP_MMHG: 13

## 2025-05-06 ASSESSMENT — PACHYMETRY
OS_CT(UM): 542
OD_CT(UM): 563

## 2025-05-06 ASSESSMENT — ENCOUNTER SYMPTOMS: EYES NEGATIVE: 1

## 2025-05-06 ASSESSMENT — SLIT LAMP EXAM - LIDS
COMMENTS: NORMAL
COMMENTS: NORMAL

## 2025-05-06 ASSESSMENT — EXTERNAL EXAM - RIGHT EYE: OD_EXAM: NORMAL

## 2025-05-06 ASSESSMENT — EXTERNAL EXAM - LEFT EYE: OS_EXAM: NORMAL

## 2025-05-06 NOTE — PROGRESS NOTES
Assessment/Plan   Diagnoses and all orders for this visit:  Primary open angle glaucoma (POAG) of both eyes, mild stage  -     [facility]ley Visual Field - OU - Both Eyes  -     bimatoprost (Lumigan) 0.01 % ophthalmic solution; Administer 1 drop into both eyes once daily at bedtime.  Stable intraocular pressure (IOP)    PCO (posterior capsular opacification), bilateral       -worse in right eye than left eye  Refer to Dr. Carson for YAG capsulotomy     Dry eyes  -Start artificial tears both eyes (OU) qid  -stress compliance    Choroidal nevus, left eye  Nevus of choroid of right eye  -managed by [facility]    Pseudophakia  continue to monitor    Moderate nonproliferative diabetic retinopathy of both eyes without macular edema associated with type 2 diabetes mellitus  .managed by [facility]    Return in  3  month(s) for follow up or sooner if having any problems

## 2025-05-08 ENCOUNTER — APPOINTMENT (OUTPATIENT)
Dept: HEMATOLOGY/ONCOLOGY | Facility: HOSPITAL | Age: 75
End: 2025-05-08
Payer: MEDICARE

## 2025-05-14 ENCOUNTER — APPOINTMENT (OUTPATIENT)
Dept: OPHTHALMOLOGY | Facility: CLINIC | Age: 75
End: 2025-05-14
Payer: MEDICARE

## 2025-05-18 ASSESSMENT — CUP TO DISC RATIO
OD_RATIO: 0.8
OS_RATIO: 0.8

## 2025-05-18 ASSESSMENT — EXTERNAL EXAM - RIGHT EYE: OD_EXAM: NORMAL

## 2025-05-18 ASSESSMENT — EXTERNAL EXAM - LEFT EYE: OS_EXAM: NORMAL

## 2025-05-18 ASSESSMENT — SLIT LAMP EXAM - LIDS
COMMENTS: GOOD POSITION
COMMENTS: GOOD POSITION

## 2025-05-18 NOTE — PROGRESS NOTES
Posterior capsular opacification, right eye  Posterior capsular opacification, left eye  Pseudophakia of right eye - 10/19/23  Pseudophakia of left eye - 9/28/23  -Visually significant PCO right eye. Symptoms: Gradual decrease in vision x 6 months. Harder to read small print. More difficulty seeing small words/numbers on TV. Having more trouble seeing road signs when driving. Glare from headlights when driving at night. Discussed diagnosis with patient and option of YAG capsulotomy. Discussed procedure. Discussed potential risks, benefits, and alternatives, including but not limited to: pain, inflammation, edema, retinal tear/detachment, floaters, increased eye pressure, damage to other ocular structures, damage to/dislocation of lens implant, glare, need to repeat procedure, loss of vision or loss of eye. Patient understands and wishes to proceed. Consent signed.  YAG capsulotomy right eye done 5/20/25. Advised to use artificial tears PRN. F/u 4-6 weeks - refract OU, glare/BAT OS, dilate OU for Planned YAG laser capsulotomy OS. D/w patient may need to repeat laser to enlarge opening in the future as needed. Call or return sooner if any redness, pain, decreased vision, flashes, or floaters.   Pulse = 58  TE = 51.4  Power = 0.8-1.0  **Referred by Dr. Boothe    Primary open angle glaucoma, both eyes, mild stage  -Continue bimatoprost OU QHS and dorzolamide OU BID  -Followed by Dr. Emery    Dry eyes, bilateral  Choroidal nevus, both eyes  Moderate nonproliferative diabetic retinopathy both eyes, without macular edema  -Monitored by [facility]    Hyperopia  Astigmatism  Presbyopia  -Refraction performed today for diagnostic purposes only and without specific intent to dispense Rx. Glasses Rx not dispensed today.   -Defer new Rx. No significant improvement in vision with refraction at this time.

## 2025-05-20 ENCOUNTER — APPOINTMENT (OUTPATIENT)
Dept: OPHTHALMOLOGY | Facility: CLINIC | Age: 75
End: 2025-05-20
Payer: MEDICARE

## 2025-05-20 DIAGNOSIS — D31.32 CHOROIDAL NEVUS, LEFT EYE: ICD-10-CM

## 2025-05-20 DIAGNOSIS — E11.3393 MODERATE NONPROLIFERATIVE DIABETIC RETINOPATHY OF BOTH EYES WITHOUT MACULAR EDEMA ASSOCIATED WITH TYPE 2 DIABETES MELLITUS: ICD-10-CM

## 2025-05-20 DIAGNOSIS — D31.31 NEVUS OF CHOROID OF RIGHT EYE: ICD-10-CM

## 2025-05-20 DIAGNOSIS — H26.491 PCO (POSTERIOR CAPSULAR OPACIFICATION), RIGHT: Primary | ICD-10-CM

## 2025-05-20 DIAGNOSIS — H52.223 REGULAR ASTIGMATISM OF BOTH EYES: ICD-10-CM

## 2025-05-20 DIAGNOSIS — H40.1131 PRIMARY OPEN ANGLE GLAUCOMA (POAG) OF BOTH EYES, MILD STAGE: ICD-10-CM

## 2025-05-20 DIAGNOSIS — Z96.1 PSEUDOPHAKIA: ICD-10-CM

## 2025-05-20 DIAGNOSIS — H04.123 DRY EYES: ICD-10-CM

## 2025-05-20 DIAGNOSIS — H52.03 HYPEROPIA OF BOTH EYES: ICD-10-CM

## 2025-05-20 DIAGNOSIS — H52.4 PRESBYOPIA: ICD-10-CM

## 2025-05-20 DIAGNOSIS — H26.492 PCO (POSTERIOR CAPSULAR OPACIFICATION), LEFT: ICD-10-CM

## 2025-05-20 PROBLEM — H25.812 COMBINED FORM OF AGE-RELATED CATARACT, LEFT EYE: Status: ACTIVE | Noted: 2025-05-20

## 2025-05-20 PROCEDURE — 99214 OFFICE O/P EST MOD 30 MIN: CPT | Performed by: OPHTHALMOLOGY

## 2025-05-20 PROCEDURE — 66821 AFTER CATARACT LASER SURGERY: CPT | Mod: RIGHT SIDE | Performed by: OPHTHALMOLOGY

## 2025-05-20 ASSESSMENT — VISUAL ACUITY
METHOD: SNELLEN - LINEAR
OS_BAT_MED: 20/60
OD_BAT_MED: 20/60
OS_CC: 20/30
CORRECTION_TYPE: GLASSES
OD_CC: 20/40

## 2025-05-20 ASSESSMENT — REFRACTION_MANIFEST
OS_CYLINDER: -0.25
OD_SPHERE: +1.25
OD_AXIS: 080
OS_ADD: +2.50
OD_CYLINDER: -1.00
OS_SPHERE: +0.75
OD_ADD: +2.50
OS_AXIS: 075

## 2025-05-20 ASSESSMENT — ENCOUNTER SYMPTOMS
MUSCULOSKELETAL NEGATIVE: 0
PSYCHIATRIC NEGATIVE: 0
CARDIOVASCULAR NEGATIVE: 0
HEMATOLOGIC/LYMPHATIC NEGATIVE: 0
GASTROINTESTINAL NEGATIVE: 0
ENDOCRINE NEGATIVE: 0
NEUROLOGICAL NEGATIVE: 0
ALLERGIC/IMMUNOLOGIC NEGATIVE: 0
EYES NEGATIVE: 1
RESPIRATORY NEGATIVE: 0
CONSTITUTIONAL NEGATIVE: 0

## 2025-05-20 ASSESSMENT — PACHYMETRY
OD_CT(UM): 563
OS_CT(UM): 542

## 2025-05-20 ASSESSMENT — TONOMETRY
IOP_METHOD: GOLDMANN APPLANATION
OD_IOP_MMHG: 17
OS_IOP_MMHG: 17

## 2025-05-20 ASSESSMENT — REFRACTION_WEARINGRX
OD_AXIS: 080
OS_SPHERE: +0.75
OS_AXIS: 075
OD_CYLINDER: -1.00
OD_SPHERE: +1.25
OS_ADD: +2.50
OS_CYLINDER: -0.25
OD_ADD: +2.50

## 2025-05-30 ENCOUNTER — APPOINTMENT (OUTPATIENT)
Dept: PRIMARY CARE | Facility: CLINIC | Age: 75
End: 2025-05-30
Payer: MEDICARE

## 2025-06-02 ENCOUNTER — OFFICE VISIT (OUTPATIENT)
Dept: PAIN MEDICINE | Facility: HOSPITAL | Age: 75
End: 2025-06-02
Payer: MEDICARE

## 2025-06-02 ENCOUNTER — HOSPITAL ENCOUNTER (OUTPATIENT)
Dept: RADIOLOGY | Facility: HOSPITAL | Age: 75
Discharge: HOME | End: 2025-06-02
Payer: MEDICARE

## 2025-06-02 DIAGNOSIS — M54.16 LUMBAR RADICULOPATHY: ICD-10-CM

## 2025-06-02 DIAGNOSIS — E66.812 OBESITY, CLASS II, BMI 35-39.9: ICD-10-CM

## 2025-06-02 DIAGNOSIS — S32.030S COMPRESSION FRACTURE OF L3 VERTEBRA, SEQUELA: Primary | ICD-10-CM

## 2025-06-02 DIAGNOSIS — Z86.711 HISTORY OF PULMONARY EMBOLISM: ICD-10-CM

## 2025-06-02 DIAGNOSIS — S32.030S COMPRESSION FRACTURE OF L3 VERTEBRA, SEQUELA: ICD-10-CM

## 2025-06-02 PROBLEM — S32.030A COMPRESSION FRACTURE OF THIRD LUMBAR VERTEBRA (MULTI): Status: ACTIVE | Noted: 2025-06-02

## 2025-06-02 PROCEDURE — 99214 OFFICE O/P EST MOD 30 MIN: CPT | Performed by: PAIN MEDICINE

## 2025-06-02 PROCEDURE — 72110 X-RAY EXAM L-2 SPINE 4/>VWS: CPT | Performed by: RADIOLOGY

## 2025-06-02 PROCEDURE — 72120 X-RAY BEND ONLY L-S SPINE: CPT

## 2025-06-02 ASSESSMENT — PAIN - FUNCTIONAL ASSESSMENT: PAIN_FUNCTIONAL_ASSESSMENT: 0-10

## 2025-06-02 ASSESSMENT — PAIN SCALES - GENERAL: PAINLEVEL_OUTOF10: 6

## 2025-06-02 NOTE — PROGRESS NOTES
Subjective   Patient ID: Lia Jeong is a 75 y.o. female with a past medical history of obesity, DM2, COPD, HFpEF, PE (on warfarin) chronic low back pain, L3 compression fracture, presents today for follow-up after seeing her hematologist.  She was cleared to stop her Coumadin without bridging for at least 5 days prior to proceeding with kyphoplasty to treat her L3 compression fracture.  The patient states that she is still having her low back pain without radicular symptoms that is worse with prolonged standing or walking.  Conservative measures have not helped her so far. The pain causes significant stress in the patient's life, interfering with general activity, mood, ability to walk distances, ability to perform tasks at home and/or work. Patient participates in physical therapy, and continues to perform physician directed exercises at home. Patient denies any bowel or bladder incontinence, saddle anesthesia, weaknesses, or falls.      Review of Systems   13-point ROS done and negative except for HPI.     Current Outpatient Medications   Medication Instructions    albuterol 90 mcg/actuation inhaler inhale two puffs by mouth every 6 hours as needed    atorvastatin (LIPITOR) 80 mg, Daily    bimatoprost (Lumigan) 0.01 % ophthalmic solution 1 drop, Both Eyes, Nightly    clonazePAM (KLONOPIN) 1 mg, Every 8 hours PRN    cloNIDine (CATAPRES) 0.1 mg, 2 times daily    dilTIAZem LA (Cardizem LA) 180 mg 24 hr tablet 1 tablet, Daily    ezetimibe (ZETIA) 10 mg, Daily    Farxiga 5 mg, Daily with breakfast    fluticasone (Flonase) 50 mcg/actuation nasal spray 1 spray, Daily    gabapentin (NEURONTIN) 1,200 mg, 3 times daily    HumaLOG U-100 Insulin 100 unit/mL injection Inject 10 Units under the skin 3 times a day before meals. 10 units after each meal    hydrALAZINE (APRESOLINE) 25 mg, oral, 3 times daily, *Please note change in dose    metoprolol tartrate (LOPRESSOR) 50 mg, 2 times daily    montelukast (SINGULAIR) 10 mg,  Nightly    Ozempic 0.25 mg or 0.5 mg (2 mg/3 mL) pen injector Last taken 1/27    potassium chloride CR 20 mEq ER tablet TAKE TWO TABLETS (40 MEQ) BY MOUTH DAILY WITH FOOD    primidone (MYSOLINE) 100 mg, oral, 2 times daily    torsemide (DEMADEX) 20 mg, oral, Daily    Trelegy Ellipta 200-62.5-25 mcg blister with device INHALE ONE PUFF BY MOUTH EVERY MORNING    warfarin (COUMADIN) 10 mg       Medical History[1]     Surgical History[2]     Family History[3]     RX Allergies[4]     Objective     There were no vitals filed for this visit.     Physical Exam  General: NAD, well groomed, well nourished  Eyes: Non-icteric sclera, EOMI  Ears, Nose, Mouth, and Throat: External ears and nose appear to be without deformity or rash. No lesions or masses noted. Hearing is grossly intact.   Neck: Supple, trachea midline, no appreciable lumps or lymph nodes  Respiratory: Nonlabored breathing   Cardiovascular: No peripheral edema observed  Skin: No rashes or open lesions/ulcers identified on skin.  Psychiatric: Alert, orientation to person, place, and time. Cooperative.    Neurologic:   Cranial nerves grossly intact.   Strength: 5/5 and symmetric plantar/dorsiflexion   Sensation: Normal to light touch throughout; pinprick intact throughout.   DTRs:normal and symmetric throughout  T[place]ano: absent     Back:   Palpation: Tenderness to palpation over lumbar paraspinous muscles.   Straight leg raise: does not reproduce their pain, bilaterally   KINDRA Maneuver does not reproduce pain bilaterally  Facet Loading test: absent on lumbar spine    Hip: No pain over greater trochanters. and Pain not reproduced with hip internal/external rotation.     Imaging personally reviewed and independently interpreted:   MR lumbar spine wo IV contrast 02/05/2025    Narrative  Interpreted By:  Judit Mitchell,  STUDY:  MRI of the lumbar spine without IV contrast;  2/5/2025 9:07 am    INDICATION:  Signs/Symptoms:low back pain.    ,S32.010S Wedge compression  fracture of first lumbar vertebra,  sequela,S32.030S Wedge compression fracture of third lumbar vertebra,  sequela,M54.16 Radiculopathy, lumbar region    COMPARISON:  CT abdomen 11/30/2024 and MRI 05/19/2022    ACCESSION NUMBER(S):  [id number]    ORDERING CLINICIAN:  [facility]    TECHNIQUE:  Sagittal and axial STIR and T1-weighted MRI images of the lumbar  spine were acquired using a spondylolysis protocol.  No contrast was  administered.    FINDINGS:  For counting purposes the last fully segmented vertebral body is  labeled L5. There is partial lumbarization of the S1 vertebral body.  The last fully formed disc space on axial image 16 series 9 we will  be labeled L5-S1.    There is trace retrolisthesis of L1 on L2 and trace anterolisthesis  of L5 on S1. Alignment is otherwise maintained.    There is a nonacute prominent Schmorl's node along the superior  endplate of L1, similar to the prior exam.    There is a superior endplate fracture/compression deformity at L3 new  from the prior MRI 05/19/2022 and slightly increased compared to the  prior CTA abdomen 11/30/2024 given differences in technique. There is  a proximally 25% loss of vertebral body height and trace osseous  retropulsion of the superior posterior cortex. There is increased  STIR signal along the superior endplate and anterior L3 vertebral  body extending into the posterior element on the left. Vertebral body  heights are otherwise maintained.    There are numerous T1 and T2 hypointense foci within the T11, T12, L3  and L5 vertebral bodies corresponding to sclerotic foci on the prior  CT and may represent bone islands.    There is desiccated disc signal throughout the lower thoracic and  lumbar spine. No significant loss of disc height.    The conus terminates at L1-L2. Prevertebral soft tissues are not  thickened.    Evaluation by level:    T11-T12: Disc bulge, facet arthrosis and ligamentum flavum  thickening. Mild spinal canal and neural  foraminal stenosis.    T12-L1: Disc bulge and facet arthrosis. No spinal canal stenosis.  Mild neural foraminal stenosis.    L1-L2: Disc bulge with a small central disc protrusion, bilateral  facet arthrosis and ligamentum flavum thickening. No spinal canal  stenosis. Mild neural foraminal stenosis.    L2-L3: Disc bulge, facet arthrosis and ligamentum flavum thickening.  Moderate spinal canal stenosis. Mild-to-moderate bilateral neural  foraminal stenosis.    L3-L4: Disc bulge and facet arthrosis. Mild spinal canal and neural  foraminal stenosis.    L4-L5: Disc bulge, facet arthrosis and ligamentum flavum thickening.  Mild spinal canal stenosis. Mild neural foraminal stenosis.    L5-S1: Mild disc uncovering, disc bulge and facet arthrosis. No  spinal canal stenosis. No neural foraminal stenosis.    Impression  There is an acute to subacute superior endplate fracture and  compression deformity at L3 with 25% loss of vertebral body height  and minimal osseous retropulsion of the posterosuperior cortex.    Degenerative changes most pronounced at L2-L3 with moderate spinal  canal and mild-to-moderate bilateral neural foraminal stenosis.    I personally reviewed the images/study and I agree with the findings  as stated. This study was interpreted at [place], [place].    MACRO:  None    Signed by: Judit Mitchell 2/5/2025 11:21 AM  Dictation workstation:   [id number]      XR hip right with pelvis when performed 2 or 3 views 09/10/2024    Narrative  Interpreted By:  Rhina Ortiz,  STUDY:  Single view pelvis.  Right hip, two views.    INDICATION:  Signs/Symptoms:Right thigh is numb.    COMPARISON:  None.    ACCESSION NUMBER(S):  [id number]    ORDERING CLINICIAN:  CHARLIE BIGGS    FINDINGS:  No acute fracture or malalignment.  Bilateral hip joint spaces are well preserved.  Mild bilateral hip osteoarthrosis with acetabular osteophytes.  Soft tissues are within  normal limits.  Lower lumbar facet joint arthropathy noted.  Calcified structure in the pelvis likely representing fibroid.  Femoral artery vascular calcifications.    Impression  1. Mild bilateral hip osteoarthrosis with osteophytosis.  2. Additional findings as above    MACRO:  None.    Signed by: Rhina Ortiz 9/11/2024 6:25 PM  Dictation workstation:   [id number]     No image results found.     1. Compression fracture of L3 vertebra, sequela  XR lumbar spine 4+ views w flexion extension      2. Lumbar radiculopathy  XR lumbar spine 4+ views w flexion extension      3. Obesity, Class II, BMI 35-39.9        4. History of pulmonary embolism      Chronic anticoagulation           Assessment/Plan   Lia Jeong is a 75 y.o. female with a past medical history of obesity, DM2, COPD, HFpEF, PE (on warfarin) chronic low back pain, L3 compression fracture, presents today for follow-up after seeing her hematologist.  She was cleared to stop her Coumadin without bridging for at least 5 days prior to proceeding with kyphoplasty to treat her L3 compression fracture.  She is still having axial low back pain without radicular symptoms that is worse with prolonged standing or walking.  Conservative measures have not helped her so far, and she is amenable to proceeding with kyphoplasty of the L3 vertebral body.    Plan:  - We will order x-ray lumbar spine flexion/extension today, to evaluate for possible worsening compression.  - We will consider scheduling patient for kyphoplasty of the L3 vertebral body under fluoroscopy pending results of x-ray.  Patient is currently on Coumadin, will need to hold it for 5 days prior to the procedure.  On the morning of the procedure, we will need to obtain a POCT INR less than 1.2.  The patient has already failed conservative therapies including medications such as acetaminophen, NSAIDs, and gabapentinoids; as well as physical therapy. Therefore, we discussed extensively the risks,  benefits, and alternatives to the procedure. The patient's questions were addressed and answered in detail. The patient demonstrated understanding of the procedure, and is amenable to proceeding with it. The Risks of the procedure that were discussed with the patient include but are not limited to the following: A lack of efficacy, transient worsening of pain, bleeding, infection, nerve injury, nerve damage, neuritis or sunburn sensation.    Follow up: After X-rays    The patient was invited to contact us back anytime with any questions or concerns and follow-up with us in the office as needed.     Diagnoses and all orders for this visit:  Compression fracture of L3 vertebra, sequela  -     XR lumbar spine 4+ views w flexion extension; Future  Lumbar radiculopathy  -     XR lumbar spine 4+ views w flexion extension; Future  Obesity, Class II, BMI 35-39.9  History of pulmonary embolism  Comments:  Chronic anticoagulation      This note was generated with the aid of dictation software, there may be typos despite my attempts at proofreading.     Rachana Bartlett MD  Interventional Pain Medicine Fellow  [facility]        [1]   Past Medical History:  Diagnosis Date    Asthma     Cataract     CHF (congestive heart failure)     Clotting disorder (Multi)     Colon polyp     COPD (chronic obstructive pulmonary disease) (Multi)     Diabetes mellitus (Multi)     Diabetic retinopathy (Multi)     Dry eyes     Easy bruising     Fractures     GERD (gastroesophageal reflux disease)     Gestational diabetes     Glaucoma     Hyperlipidemia     Hypertension     Obesity     Peripheral neuropathy     Pulmonary embolus     Shortness of breath     Sleep apnea     Type 2 diabetes mellitus    [2]   Past Surgical History:  Procedure Laterality Date    CATARACT EXTRACTION W/  INTRAOCULAR LENS IMPLANT Right 10/19/2023    Dr. Carson    CATARACT EXTRACTION W/  INTRAOCULAR LENS IMPLANT Left 09/28/2023    Dr. Carson     COLONOSCOPY      EYE SURGERY  03/19/2015    Eye Surgery    INTRAOCULAR LENS INSERTION      MR HEAD ANGIO WO IV CONTRAST  05/18/2022    MR HEAD ANGIO WO IV CONTRAST 5/18/2022 [facility]    MR NECK ANGIO WO IV CONTRAST  05/18/2022    MR NECK ANGIO WO IV CONTRAST 5/18/2022 [facility]    TONSILLECTOMY  09/28/2017    Tonsillectomy    TUBAL LIGATION  09/28/2017    Tubal Ligation   [3]   Family History  Problem Relation Name Age of Onset    Hypertension Mother Betty Gotti     Hyperlipidemia Mother Betty Shen     Diabetes Mother Betty Gotti     Breast cancer Brother     [4]   Allergies  Allergen Reactions    Amoxicillin Diarrhea    Amoxicillin-Pot Clavulanate Diarrhea     SEVERE DIARRHEA    Dapagliflozin Rash     Yeast infect in perineal area    Hydrochlorothiazide Other     DECREASE SEXUAL LIBIDO    Low libido    Lisinopril Cough     COUGH    Losartan Cough

## 2025-06-04 ENCOUNTER — CLINICAL SUPPORT (OUTPATIENT)
Dept: PREADMISSION TESTING | Facility: HOSPITAL | Age: 75
End: 2025-06-04
Payer: MEDICARE

## 2025-06-04 VITALS — HEIGHT: 65 IN | BODY MASS INDEX: 33.32 KG/M2 | WEIGHT: 200 LBS

## 2025-06-04 RX ORDER — VIT C/E/ZN/COPPR/LUTEIN/ZEAXAN 250MG-90MG
25 CAPSULE ORAL DAILY
COMMUNITY

## 2025-06-04 RX ORDER — BRIMONIDINE TARTRATE 2 MG/ML
1 SOLUTION/ DROPS OPHTHALMIC 2 TIMES DAILY
COMMUNITY

## 2025-06-04 RX ORDER — OMEGA-3-ACID ETHYL ESTERS 1 G/1
1 CAPSULE, LIQUID FILLED ORAL DAILY
COMMUNITY

## 2025-06-04 RX ORDER — INSULIN GLARGINE 100 [IU]/ML
50 INJECTION, SOLUTION SUBCUTANEOUS EVERY 24 HOURS
COMMUNITY

## 2025-06-04 RX ORDER — MULTIVITAMIN/IRON/FOLIC ACID 18MG-0.4MG
1 TABLET ORAL DAILY
COMMUNITY

## 2025-06-04 RX ORDER — DORZOLAMIDE HCL 20 MG/ML
1 SOLUTION/ DROPS OPHTHALMIC DAILY
COMMUNITY

## 2025-06-04 ASSESSMENT — ENCOUNTER SYMPTOMS
RHINORRHEA: 0
VOMITING: 0
BRUISES/BLEEDS EASILY: 1
FEVER: 0
EYE DISCHARGE: 0
CHILLS: 0
SHORTNESS OF BREATH: 0
MYALGIAS: 0
DIFFICULTY URINATING: 0
ABDOMINAL PAIN: 0
WOUND: 1
CONFUSION: 0
DYSPNEA AT REST: 0
NUMBNESS: 0

## 2025-06-04 NOTE — CPM/PAT H&P
[facility]/PAT Evaluation       Name: Lia Jeong)  /Age: 1950/75 y.o.     {[facility] Visit Type:13118}      Chief Complaint: ***    HPI    Medical History[1]    Surgical History[2]    Patient  reports that she is not currently sexually active. She reports using the following method of birth control/protection: None.    Family History[3]    Allergies[4]    Prior to Admission medications    Medication Sig Start Date End Date Taking? Authorizing Provider   albuterol 90 mcg/actuation inhaler inhale two puffs by mouth every 6 hours as needed 1/3/25  Yes Historical Provider, MD   atorvastatin (Lipitor) 80 mg tablet Take 1 tablet (80 mg) by mouth once daily. 14  Yes Historical Provider, MD   b complex 0.4 mg tablet Take 1 tablet by mouth once daily.   Yes Historical Provider, MD   bimatoprost (Lumigan) 0.01 % ophthalmic solution Administer 1 drop into both eyes once daily at bedtime. 25  Yes Beryl Boothe MD   brimonidine (AlphaGAN) 0.2 % ophthalmic solution Administer 1 drop into both eyes 2 times a day.   Yes Historical Provider, MD   cholecalciferol (Vitamin D-3) 25 mcg (1,000 units) capsule Take 1 capsule (25 mcg) by mouth once daily.   Yes Historical Provider, MD   clonazePAM (KlonoPIN) 1 mg tablet Take 1 tablet (1 mg) by mouth every 8 hours if needed. 25 Yes Historical Provider, MD   cloNIDine (Catapres) 0.1 mg tablet Take 1 tablet (0.1 mg) by mouth twice a day. 14  Yes Historical Provider, MD   dapagliflozin propanediol (Farxiga) 10 mg tablet Take 1 tablet (10 mg) by mouth once daily with breakfast. 23  Yes Historical Provider, MD   dilTIAZem LA (Cardizem LA) 180 mg 24 hr tablet Take 1 tablet (180 mg) by mouth once daily.   Yes Historical Provider, MD   dorzolamide (Trusopt) 2 % ophthalmic solution Administer 1 drop into both eyes once daily.   Yes Historical Provider, MD   ezetimibe (Zetia) 10 mg tablet Take 1 tablet (10 mg) by mouth once daily.   Yes Historical  Provider, MD   fluticasone (Flonase) 50 mcg/actuation nasal spray Administer 1 spray into each nostril once daily. 5/8/14  Yes Historical Provider, MD   gabapentin (Neurontin) 600 mg tablet Take 2 tablets (1,200 mg) by mouth 3 times a day. 12/29/17 6/4/25 Yes Historical Provider, MD   HumaLOG U-100 Insulin 100 unit/mL injection Inject 15 Units under the skin 3 times a day before meals. 15 units with meal 8/20/24  Yes Historical Provider, MD   hydrALAZINE (Apresoline) 50 mg tablet Take 0.5 tablets (25 mg) by mouth 3 times a day. *Please note change in dose  Patient taking differently: Take 1 tablet (50 mg) by mouth 3 times a day. *Please note change in dose 10/5/23 6/4/25 Yes WINNIE Verdin   insulin glargine (Lantus U-100 Insulin) 100 unit/mL injection Inject 50 Units under the skin once every 24 hours. Take as directed per insulin instructions.   Yes Historical Provider, MD   metoprolol tartrate (Lopressor) 100 mg tablet Take 0.5 tablets by mouth 2 times a day. 5/19/22  Yes Historical Provider, MD   montelukast (Singulair) 10 mg tablet Take 1 tablet (10 mg) by mouth once daily at bedtime. 8/11/14  Yes Historical Provider, MD   omega-3 acid ethyl esters (Lovaza) 1 gram capsule Take 1 capsule (1 g) by mouth once daily.   Yes Historical Provider, MD   potassium chloride CR 20 mEq ER tablet TAKE TWO TABLETS (40 MEQ) BY MOUTH DAILY WITH FOOD 10/6/24  Yes Historical Provider, MD   semaglutide (OZEMPIC) 1 mg/dose (4 mg/3 mL) pen injector 1 (one) time per week. Last taken monday   Yes Historical Provider, MD   torsemide (Demadex) 20 mg tablet Take 1 tablet (20 mg) by mouth once daily.  Patient taking differently: Take 2 tablets (40 mg) by mouth once daily. 10/5/23 6/4/25 Yes WINNIE Verdin   Trelegy Ellipta 200-62.5-25 mcg blister with device INHALE ONE PUFF BY MOUTH EVERY MORNING 7/1/24  Yes Historical Provider, MD   warfarin (Coumadin) 5 mg tablet Take 1 tablet (5 mg) by mouth. Take as directed per  "After Visit Summary. Pt takes 1.5 tablet Tuesday, Thursday, Saturday and Sunday. Monday Wednesday and Friday 1 tablet   Yes Historical Provider, MD   primidone (Mysoline) 50 mg tablet Take 2 tablets (100 mg) by mouth 2 times a day.  Patient not taking: No sig reported 12/18/23 4/16/24  Johnathan Buenrostro MD        PAT ROS:   Constitutional:    no fever   no chills  Neuro/Psych:    no numbness   no confusion  Eyes:    no discharge   use of corrective lenses  Ears:    no hearing aides  Nose:    no nasal discharge  Mouth:    no dental issues  Throat:    no throat pain  Neck:   Cardio:    no chest pain   no dyspnea  Respiratory:    no shortness of breath  Endocrine:   GI:    no abdominal pain   no vomiting  :    no difficulty urinating  Musculoskeletal:    no myalgias  Hematologic:    bruises/bleeds easily   no history of blood transfusion   blood clots  Skin:   sores/wound   no rash    PAT Physical Exam     Airway    Testing/Diagnostic:     Patient Specialist/PCP:     Visit Vitals  Ht 1.651 m (5' 5\") Comment: stated   Wt 90.7 kg (200 lb) Comment: stated   BMI 33.28 kg/m²   OB Status Postmenopausal   Smoking Status Never   BSA 2.04 m²       DASI Risk Score      Flowsheet Row Pre-Admission Testing from 3/18/2025 in [facility]   Can you take care of yourself (eat, dress, bathe, or use toilet)?  2.75 filed at 03/18/2025 0921   Can you walk indoors, such as around your house? 1.75 filed at 03/18/2025 0921   Can you walk a block or two on level ground?  2.75 filed at 03/18/2025 0921   Can you climb a flight of stairs or walk up a hill? 5.5 filed at 03/18/2025 0921   Can you run a short distance? 0 filed at 03/18/2025 0921   Can you do light work around the house like dusting or washing dishes? 2.7 filed at 03/18/2025 0921   Can you do moderate work around the house like vacuuming, sweeping floors or carrying groceries? 3.5 filed at 03/18/2025 0921   Can you do heavy work around the house like scrubbing " floors or lifting and moving heavy furniture?  0 filed at 03/18/2025 0921   Can you do yard work like raking leaves, weeding or pushing a mower? 0 filed at 03/18/2025 0921   Can you have sexual relations? 5.25 filed at 03/18/2025 0921   Can you participate in moderate recreational activities like golf, bowling, dancing, doubles tennis or throwing a baseball or football? 6 filed at 03/18/2025 0921   Can you participate in strenous sports like swimming, singles tennis, football, basketball, or skiing? 0 filed at 03/18/2025 0921   DASI SCORE 30.2 filed at 03/18/2025 0921   METS Score (Will be calculated only when all the questions are answered) 6.5 filed at 03/18/2025 0921          Caprini DVT Assessment      Flowsheet Row Pre-Admission Testing from 3/18/2025 in [facility] ED to Hosp-Admission (Discharged) from 9/30/2023 in [facility] Bldg A 5 with Ghazal Gray MD and Joshua Parekh MD   DVT Score (IF A SCORE IS NOT CALCULATING, MUST SELECT A BMI TO COMPLETE) 16 filed at 03/18/2025 0920 4 filed at 09/30/2023 1652   Medical Factors COPD, Congestive Heart Failure < 1 month, History of DVT/PE filed at 03/18/2025 0920 --   Surgical Factors Major surgery planned, lasting 2-3 hours filed at 03/18/2025 0920 --   Labs/Test Results Elevated Anticardiolipin Antibodies filed at 03/18/2025 0920 --   BMI (BMI MUST BE CHOSEN) 31-40 (Obesity) filed at 03/18/2025 0920 31-40 (Obesity) filed at 09/30/2023 1652   RETIRED: Current Status -- Swollen legs filed at 09/30/2023 1652   RETIRED: Age -- 60-75 years filed at 09/30/2023 1652          Modified Frailty Index      Flowsheet Row Pre-Admission Testing from 3/18/2025 in [facility]   Non-independent functional status (problems with dressing, bathing, personal grooming, or cooking) 0 filed at 03/18/2025 0921   History of diabetes mellitus  0.0909 filed at 03/18/2025 0921   History of COPD 0.0909 filed at 03/18/2025 0921   History  of CHF 0.0909 filed at 03/18/2025 0921   History of MI 0 filed at 03/18/2025 0921   History of Percutaneous Coronary Intervention, Cardiac Surgery, or Angina No filed at 03/18/2025 0921   Hypertension requiring the use of medication  0.0909 filed at 03/18/2025 0921   Peripheral vascular disease 0 filed at 03/18/2025 0921   Impaired sensorium (cognitive impairement or loss, clouding, or delirium) 0 filed at 03/18/2025 0921   TIA or CVA withouy residual deficit 0 filed at 03/18/2025 0921   Cerebrovascular accident with deficit 0 filed at 03/18/2025 0921   Modified Frailty Index Calculator .3636 filed at 03/18/2025 0921          [id number] Stroke Risk Points  Current as of just now        N/A 0 to 9 Points:      Last Change: N/A          The [id number] risk score (Lip EVE, et al. 2009. © 2010 [employer]) quantifies the risk of stroke for a patient with atrial fibrillation. For patients without atrial fibrillation or under the age of 18 this score appears as N/A. Higher score values generally indicate higher risk of stroke.        This score is not applicable to this patient. Components are not calculated.          Revised Cardiac Risk Index      Flowsheet Row Pre-Admission Testing from 3/18/2025 in [facility]   High-Risk Surgery (Intraperitoneal, Intrathoracic,Suprainguinal vascular) 0 filed at 03/18/2025 0921   History of ischemic heart disease (History of MI, History of positive exercuse test, Current chest paint considered due to myocardial ischemia, Use of nitrate therapy, ECG with pathological Q Waves) 0 filed at 03/18/2025 0921   History of congestive heart failure (pulmonary edemia, bilateral rales or S3 gallop, Paroxysmal nocturnal dyspnea, CXR showing pulmonary vascular redistribution) 1 filed at 03/18/2025 0921   History of cerebrovascular disease (Prior TIA or stroke) 0 filed at 03/18/2025 0921   Pre-operative insulin treatment 1 filed at 03/18/2025 0921    Pre-operative creatinine>2 mg/dl 0 filed at 03/18/2025 0921   Revised Cardiac Risk Calculator 2 filed at 03/18/2025 0921          Apfel Simplified Score    No data to display       Risk Analysis Index Results This Encounter    No data found in the last 10 encounters.       Stop Bang Score      Flowsheet Row Clinical Support from 6/4/2025 in [facility] Pre-Admission Testing from 3/18/2025 in [facility]   Do you snore loudly? 0 filed at 06/04/2025 1408 1 filed at 03/18/2025 0840   Do you often feel tired or fatigued after your sleep? 1 filed at 06/04/2025 1408 0 filed at 03/18/2025 0840   Has anyone ever observed you stop breathing in your sleep? 1 filed at 06/04/2025 1408 0 filed at 03/18/2025 0840   Do you have or are you being treated for high blood pressure? 1 filed at 06/04/2025 1408 1 filed at 03/18/2025 0840   Recent BMI (Calculated) 33.3 filed at 06/04/2025 1408 34.9 filed at 03/18/2025 0840   Is BMI greater than 35 kg/m2? 0=No filed at 06/04/2025 1408 0=No filed at 03/18/2025 0840   Age older than 50 years old? 1=Yes filed at 06/04/2025 1408 1=Yes filed at 03/18/2025 0840   Is your neck circumference greater than 17 inches (Male) or 16 inches (Female)? -- 0 filed at 03/18/2025 0840   Gender - Male 0=No filed at 06/04/2025 1408 0=No filed at 03/18/2025 0840   STOP-BANG Total Score -- 3 filed at 03/18/2025 0840          Prodigy: High Risk  Total Score: 19              Prodigy Age Score      Prodigy CHF score          ARISCAT Score for Postoperative Pulmonary Complications      Flowsheet Row Pre-Admission Testing from 3/18/2025 in [facility]   Age Calculated Score 3 filed at 03/18/2025 0921   Preoperative SpO2 0 filed at 03/18/2025 0921   Respiratory infection in the last month Either upper or lower (i.e., URI, bronchitis, pneumonia), with fever and antibiotic treatment 0 filed at 03/18/2025 0921   Preoperative anemia (Hgb less than 10 g/dl) 0 filed at  03/18/2025 0921   Surgical incision  0 filed at 03/18/2025 0921   Duration of surgery  16 filed at 03/18/2025 0921   Emergency Procedure  0 filed at 03/18/2025 0921   ARISCAT Total Score  19 filed at 03/18/2025 0921          Darell Perioperative Risk for Myocardial Infarction or Cardiac Arrest (GENE)      Flowsheet Row Pre-Admission Testing from 3/18/2025 in [facility]   Calculated Age Score 1.5 filed at 03/18/2025 0922   Functional Status  0 filed at 03/18/2025 0922   ASA Class  -3.29 filed at 03/18/2025 0922   Creatinine 0 filed at 03/18/2025 0922   Type of Procedure  0.21 filed at 03/18/2025 0922   GENE Total Score  -6.83 filed at 03/18/2025 0922   GENE % 0.11 filed at 03/18/2025 0922            Assessment and Plan:     {[facility] ASSESSMENT AND PLAN:18110}               [1]  Past Medical History:  Diagnosis Date   • Arthritis    • Asthma    • Cataract    • CHF (congestive heart failure)    • Clotting disorder (Multi)    • Colon polyp    • COPD (chronic obstructive pulmonary disease) (Multi)    • Diabetes mellitus (Multi)    • Diabetic retinopathy (Multi)    • Dry eyes    • Easy bruising    • Fractures    • GERD (gastroesophageal reflux disease)    • Gestational diabetes    • Glaucoma    • Hyperlipidemia    • Hypertension    • Obesity    • Peripheral neuropathy    • Pulmonary embolus    • Shortness of breath    • Sleep apnea    • Type 2 diabetes mellitus    [2]  Past Surgical History:  Procedure Laterality Date   • CATARACT EXTRACTION W/  INTRAOCULAR LENS IMPLANT Right 10/19/2023    Dr. Carson   • CATARACT EXTRACTION W/  INTRAOCULAR LENS IMPLANT Left 09/28/2023    Dr. Carson   • COLONOSCOPY     • EYE SURGERY  03/19/2015    Eye Surgery   • EYE SURGERY Right 05/20/2025   • INTRAOCULAR LENS INSERTION     • MR HEAD ANGIO WO IV CONTRAST  05/18/2022    MR HEAD ANGIO WO IV CONTRAST 5/18/2022 [facility]   • MR NECK ANGIO WO IV CONTRAST  05/18/2022    MR NECK ANGIO WO IV CONTRAST 5/18/2022 [facility]   • TONSILLECTOMY  09/28/2017    Tonsillectomy   • TUBAL LIGATION  09/28/2017    Tubal Ligation   [3]  Family History  Problem Relation Name Age of Onset   • Hypertension Mother Betty Gotti    • Hyperlipidemia Mother Betty Gotti    • Diabetes Mother Betty Gotti    • Breast cancer Sister     [4]  Allergies  Allergen Reactions   • Amoxicillin Diarrhea   • Amoxicillin-Pot Clavulanate Diarrhea     SEVERE DIARRHEA   • Hydrochlorothiazide Other     DECREASE SEXUAL LIBIDO    Low libido   • Lisinopril Cough     COUGH   • Losartan Cough

## 2025-06-04 NOTE — PERIOPERATIVE NURSING NOTE
Spoke with patient and completed RN PAT screening call. Discussed preoperative surgical instructions and education. Chart updated per information provided by patient. Patient had no further questions at this time. In person PAT appointment is scheduled. Patient aware of appointment. Reviewed medication and wash instructions.

## 2025-06-09 DIAGNOSIS — S32.010G COMPRESSION FRACTURE OF L1 VERTEBRA WITH DELAYED HEALING, SUBSEQUENT ENCOUNTER: ICD-10-CM

## 2025-06-11 ENCOUNTER — PRE-ADMISSION TESTING (OUTPATIENT)
Dept: PREADMISSION TESTING | Facility: HOSPITAL | Age: 75
End: 2025-06-11
Payer: MEDICARE

## 2025-06-11 VITALS
WEIGHT: 216.71 LBS | HEART RATE: 77 BPM | TEMPERATURE: 97.9 F | SYSTOLIC BLOOD PRESSURE: 158 MMHG | OXYGEN SATURATION: 95 % | RESPIRATION RATE: 16 BRPM | DIASTOLIC BLOOD PRESSURE: 84 MMHG | BODY MASS INDEX: 36.11 KG/M2 | HEIGHT: 65 IN

## 2025-06-11 DIAGNOSIS — S32.030S COMPRESSION FRACTURE OF L3 VERTEBRA, SEQUELA: ICD-10-CM

## 2025-06-11 DIAGNOSIS — Z01.818 PREOP TESTING: Primary | ICD-10-CM

## 2025-06-11 PROCEDURE — 87081 CULTURE SCREEN ONLY: CPT | Mod: BEALAB

## 2025-06-11 PROCEDURE — 99204 OFFICE O/P NEW MOD 45 MIN: CPT | Performed by: NURSE PRACTITIONER

## 2025-06-11 ASSESSMENT — PAIN SCALES - GENERAL: PAINLEVEL_OUTOF10: 5 - MODERATE PAIN

## 2025-06-11 ASSESSMENT — PAIN DESCRIPTION - DESCRIPTORS: DESCRIPTORS: DULL

## 2025-06-11 ASSESSMENT — PAIN - FUNCTIONAL ASSESSMENT: PAIN_FUNCTIONAL_ASSESSMENT: 0-10

## 2025-06-11 NOTE — CPM/PAT H&P
[facility]/PAT Evaluation       Name: Lia Jeong (Lia Jeong)  /Age: 1950/75 y.o.     In-Person       Chief Complaint: Compression fracture of L3 vertebra, sequela     HPI  Patient is an alert and oriented 75 year old female scheduled for a  VERTEBROPLASTY, SPINE, LUMBAR  on  with Dr. Colon for  Compression fracture of L3 vertebra, sequela. PMHX includes DM2, COPD, HF, PE. Presents to [facility] today for perioperative risk stratification and optimization.     Medical History[1]    Surgical History[2]    Patient  reports that she is not currently sexually active. She reports using the following method of birth control/protection: None.    Family History[3]    Allergies[4]    Prior to Admission medications    Medication Sig Start Date End Date Taking? Authorizing Provider   albuterol 90 mcg/actuation inhaler inhale two puffs by mouth every 6 hours as needed 1/3/25   Historical Provider, MD   atorvastatin (Lipitor) 80 mg tablet Take 1 tablet (80 mg) by mouth once daily. 14   Historical Provider, MD   b complex 0.4 mg tablet Take 1 tablet by mouth once daily.    Historical Provider, MD   bimatoprost (Lumigan) 0.01 % ophthalmic solution Administer 1 drop into both eyes once daily at bedtime. 25   Beryl Boothe MD   brimonidine (AlphaGAN) 0.2 % ophthalmic solution Administer 1 drop into both eyes 2 times a day.    Historical Provider, MD   cholecalciferol (Vitamin D-3) 25 mcg (1,000 units) capsule Take 1 capsule (25 mcg) by mouth once daily.    Historical Provider, MD   clonazePAM (KlonoPIN) 1 mg tablet Take 1 tablet (1 mg) by mouth every 8 hours if needed. 25  Historical Provider, MD   cloNIDine (Catapres) 0.1 mg tablet Take 1 tablet (0.1 mg) by mouth twice a day. 14   Historical Provider, MD   dapagliflozin propanediol (Farxiga) 10 mg tablet Take 1 tablet (10 mg) by mouth once daily with breakfast. 23   Historical Provider, MD   dilTIAZem LA (Cardizem LA) 180 mg 24 hr  tablet Take 1 tablet (180 mg) by mouth once daily.    Historical Provider, MD   dorzolamide (Trusopt) 2 % ophthalmic solution Administer 1 drop into both eyes once daily.    Historical Provider, MD   ezetimibe (Zetia) 10 mg tablet Take 1 tablet (10 mg) by mouth once daily.    Historical Provider, MD   fluticasone (Flonase) 50 mcg/actuation nasal spray Administer 1 spray into each nostril once daily. 5/8/14   Historical Provider, MD   gabapentin (Neurontin) 600 mg tablet Take 2 tablets (1,200 mg) by mouth 3 times a day. 12/29/17 6/4/25  Historical Provider, MD   HumaLOG U-100 Insulin 100 unit/mL injection Inject 15 Units under the skin 3 times a day before meals. 15 units with meal 8/20/24   Historical Provider, MD   hydrALAZINE (Apresoline) 50 mg tablet Take 0.5 tablets (25 mg) by mouth 3 times a day. *Please note change in dose  Patient taking differently: Take 1 tablet (50 mg) by mouth 3 times a day. *Please note change in dose 10/5/23 6/4/25  MINDY Verdin-CNP   insulin glargine (Lantus U-100 Insulin) 100 unit/mL injection Inject 50 Units under the skin once every 24 hours. Take as directed per insulin instructions.    Historical Provider, MD   metoprolol tartrate (Lopressor) 100 mg tablet Take 0.5 tablets by mouth 2 times a day. 5/19/22   Historical Provider, MD   montelukast (Singulair) 10 mg tablet Take 1 tablet (10 mg) by mouth once daily at bedtime. 8/11/14   Historical Provider, MD   omega-3 acid ethyl esters (Lovaza) 1 gram capsule Take 1 capsule (1 g) by mouth once daily.    Historical Provider, MD   potassium chloride CR 20 mEq ER tablet TAKE TWO TABLETS (40 MEQ) BY MOUTH DAILY WITH FOOD 10/6/24   Historical Provider, MD   primidone (Mysoline) 50 mg tablet Take 2 tablets (100 mg) by mouth 2 times a day.  Patient not taking: No sig reported 12/18/23 4/16/24  Johnathan Buenrostro MD   semaglutide (OZEMPIC) 1 mg/dose (4 mg/3 mL) pen injector 1 (one) time per week. Last taken monday    Historical  "Provider, MD   torsemide (Demadex) 20 mg tablet Take 1 tablet (20 mg) by mouth once daily.  Patient taking differently: Take 2 tablets (40 mg) by mouth once daily. 10/5/23 6/4/25  MINDY Verdin-PRABHJOT   [place] 200-62.5-25 mcg blister with device INHALE ONE PUFF BY MOUTH EVERY MORNING 7/1/24   Historical Provider, MD   warfarin (Coumadin) 5 mg tablet Take 1 tablet (5 mg) by mouth. Take as directed per After Visit Summary. Pt takes 1.5 tablet Tuesday, Thursday, Saturday and Sunday. Monday Wednesday and Friday 1 tablet    Historical Provider, MD          Visit Vitals  /84   Pulse 77   Temp 36.6 °C (97.9 °F) (Temporal)   Resp 16   Ht 1.651 m (5' 5\")   Wt 98.3 kg (216 lb 11.4 oz)   SpO2 95%   BMI 36.06 kg/m²   OB Status Postmenopausal   Smoking Status Never   BSA 2.12 m²      Review of Systems   Constitutional: Negative for chills, decreased appetite, diaphoresis, fever and malaise/fatigue.   Eyes:  Negative for blurred vision and double vision.   Cardiovascular:  Negative for chest pain, claudication, cyanosis, dyspnea on exertion, irregular heartbeat, leg swelling, near-syncope and palpitations.   Respiratory:  Negative for cough, hemoptysis, shortness of breath and wheezing.    Endocrine: Negative for cold intolerance, heat intolerance, polydipsia, polyphagia and polyuria.   Gastrointestinal:  Negative for abdominal pain, constipation, diarrhea, dysphagia, nausea and vomiting.   Genitourinary:  Negative for bladder incontinence, dysuria, hematuria, incomplete emptying, nocturia, frequency, pelvic pain and urgency.   Neurological:  Negative for headaches, light-headedness, paresthesias, sensory change and weakness.   Psychiatric/Behavioral:  Negative for altered mental status.    Musculoskeletal: Positive for myalgias, arthralgias     Vitals and nursing note reviewed.     Physical exam  Constitutional:       Appearance: Normal appearance. She is Obese.   HENT:      Head: Normocephalic and atraumatic. "      Mouth/Throat:      Mouth: Mucous membranes are moist.      Pharynx: Oropharynx is clear.   Eyes:      Extraocular Movements: Extraocular movements intact.      Conjunctiva/sclera: Conjunctivae normal.      Pupils: Pupils are equal, round, and reactive to light.   Cardiovascular:      PMI at left midclavicular line. Normal rate. Regular rhythm. Normal S1. Normal S2.       Murmurs: There is no murmur.      No gallop.  No click. No rub.       No audible carotid bruit     No lower extremity edema on exam  Pulmonary:      Effort: Pulmonary effort is normal.      Breath sounds: Normal breath sounds.   Abdominal:      General: Abdomen is flat. Bowel sounds are normal.      Palpations: Abdomen is soft and non-tender  Musculoskeletal:      Cervical back: Normal range of motion and neck supple.   Skin:     General: Skin is warm and dry.      Capillary Refill: Capillary refill takes less than 2 seconds.   Neurological:      General: No focal deficit present.      Mental Status: She is alert and oriented to person, place, and time. Mental status is at baseline.   Psychiatric:         Mood and Affect: Mood normal.         Behavior: Behavior normal.         Thought Content: Thought content normal.         Judgment: Judgment normal.     Vitals and nursing note reviewed. Physical exam within normal limits.     DASI Risk Score      Flowsheet Row Pre-Admission Testing from 3/18/2025 in [facility]   Can you take care of yourself (eat, dress, bathe, or use toilet)?  2.75 filed at 03/18/2025 0921   Can you walk indoors, such as around your house? 1.75 filed at 03/18/2025 0921   Can you walk a block or two on level ground?  2.75 filed at 03/18/2025 0921   Can you climb a flight of stairs or walk up a hill? 5.5 filed at 03/18/2025 0921   Can you run a short distance? 0 filed at 03/18/2025 0921   Can you do light work around the house like dusting or washing dishes? 2.7 filed at 03/18/2025 0921   Can you do moderate  work around the house like vacuuming, sweeping floors or carrying groceries? 3.5 filed at 03/18/2025 0921   Can you do heavy work around the house like scrubbing floors or lifting and moving heavy furniture?  0 filed at 03/18/2025 0921   Can you do yard work like raking leaves, weeding or pushing a mower? 0 filed at 03/18/2025 0921   Can you have sexual relations? 5.25 filed at 03/18/2025 0921   Can you participate in moderate recreational activities like golf, bowling, dancing, doubles tennis or throwing a baseball or football? 6 filed at 03/18/2025 0921   Can you participate in strenous sports like swimming, singles tennis, football, basketball, or skiing? 0 filed at 03/18/2025 0921   DASI SCORE 30.2 filed at 03/18/2025 0921   METS Score (Will be calculated only when all the questions are answered) 6.5 filed at 03/18/2025 0921          Caprini DVT Assessment      Flowsheet Row Pre-Admission Testing from 6/11/2025 in [facility] Pre-Admission Testing from 3/18/2025 in [facility]   DVT Score (IF A SCORE IS NOT CALCULATING, MUST SELECT A BMI TO COMPLETE) 11 filed at 06/11/2025 1730 16 filed at 03/18/2025 0920   Medical Factors History of DVT/PE, COPD filed at 06/11/2025 1730 COPD, Congestive Heart Failure < 1 month, History of DVT/PE filed at 03/18/2025 0920   Surgical Factors Major surgery planned, including arthroscopic and laproscopic (1-2 hours) filed at 06/11/2025 1730 Major surgery planned, lasting 2-3 hours filed at 03/18/2025 0920   Labs/Test Results -- Elevated Anticardiolipin Antibodies filed at 03/18/2025 0920   BMI (BMI MUST BE CHOSEN) 31-40 (Obesity) filed at 06/11/2025 1730 31-40 (Obesity) filed at 03/18/2025 0920          Modified Frailty Index      Flowsheet Row Pre-Admission Testing from 3/18/2025 in [facility]   Non-independent functional status (problems with dressing, bathing, personal grooming, or cooking) 0 filed at 03/18/2025 9862   History of  diabetes mellitus  0.0909 filed at 03/18/2025 0921   History of COPD 0.0909 filed at 03/18/2025 0921   History of CHF 0.0909 filed at 03/18/2025 0921   History of MI 0 filed at 03/18/2025 0921   History of Percutaneous Coronary Intervention, Cardiac Surgery, or Angina No filed at 03/18/2025 0921   Hypertension requiring the use of medication  0.0909 filed at 03/18/2025 0921   Peripheral vascular disease 0 filed at 03/18/2025 0921   Impaired sensorium (cognitive impairement or loss, clouding, or delirium) 0 filed at 03/18/2025 0921   TIA or CVA withouy residual deficit 0 filed at 03/18/2025 0921   Cerebrovascular accident with deficit 0 filed at 03/18/2025 0921   Modified Frailty Index Calculator .3636 filed at 03/18/2025 0921          [id number] Stroke Risk Points  Current as of just now        N/A 0 to 9 Points:      Last Change: N/A          The [id number] risk score (Lip EVE, et al. 2009. © 2010 [employer]) quantifies the risk of stroke for a patient with atrial fibrillation. For patients without atrial fibrillation or under the age of 18 this score appears as N/A. Higher score values generally indicate higher risk of stroke.        This score is not applicable to this patient. Components are not calculated.          Revised Cardiac Risk Index      Flowsheet Row Pre-Admission Testing from 6/11/2025 in [facility] Pre-Admission Testing from 3/18/2025 in [facility]   High-Risk Surgery (Intraperitoneal, Intrathoracic,Suprainguinal vascular) 0 filed at 06/11/2025 1731 0 filed at 03/18/2025 0921   History of ischemic heart disease (History of MI, History of positive exercuse test, Current chest paint considered due to myocardial ischemia, Use of nitrate therapy, ECG with pathological Q Waves) 0 filed at 06/11/2025 1731 0 filed at 03/18/2025 0921   History of congestive heart failure (pulmonary edemia, bilateral rales or S3 gallop, Paroxysmal nocturnal  dyspnea, CXR showing pulmonary vascular redistribution) 1 filed at 06/11/2025 1731 1 filed at 03/18/2025 0921   History of cerebrovascular disease (Prior TIA or stroke) 0 filed at 06/11/2025 1731 0 filed at 03/18/2025 0921   Pre-operative insulin treatment 1 filed at 06/11/2025 1731 1 filed at 03/18/2025 0921   Pre-operative creatinine>2 mg/dl 0 filed at 06/11/2025 1731 0 filed at 03/18/2025 0921   Revised Cardiac Risk Calculator 2 filed at 06/11/2025 1731 2 filed at 03/18/2025 0921          Apfel Simplified Score    No data to display       Risk Analysis Index Results This Encounter    No data found in the last 10 encounters.       Stop Bang Score      Flowsheet Row Pre-Admission Testing from 6/11/2025 in [facility] Clinical Support from 6/4/2025 in [facility]   Do you snore loudly? 0 filed at 06/11/2025 1646 0 filed at 06/04/2025 1408   Do you often feel tired or fatigued after your sleep? 0 filed at 06/11/2025 1646 1 filed at 06/04/2025 1408   Has anyone ever observed you stop breathing in your sleep? 1 filed at 06/11/2025 1646 1 filed at 06/04/2025 1408   Do you have or are you being treated for high blood pressure? 1 filed at 06/11/2025 1646 1 filed at 06/04/2025 1408   Recent BMI (Calculated) 36.1 filed at 06/11/2025 1646 33.3 filed at 06/04/2025 1408   Is BMI greater than 35 kg/m2? 1=Yes filed at 06/11/2025 1646 0=No filed at 06/04/2025 1408   Age older than 50 years old? 1=Yes filed at 06/11/2025 1646 1=Yes filed at 06/04/2025 1408   Is your neck circumference greater than 17 inches (Male) or 16 inches (Female)? 0 filed at 06/11/2025 1646 --   Gender - Male 0=No filed at 06/11/2025 1646 0=No filed at 06/04/2025 1408   STOP-BANG Total Score 4 filed at 06/11/2025 1646 --          Prodigy: High Risk  Total Score: 19              Prodigy Age Score      Prodigy CHF score          ARISCAT Score for Postoperative Pulmonary Complications      Flowsheet Row Pre-Admission Testing from  3/18/2025 in [facility]   Age Calculated Score 3 filed at 03/18/2025 0921   Preoperative SpO2 0 filed at 03/18/2025 0921   Respiratory infection in the last month Either upper or lower (i.e., URI, bronchitis, pneumonia), with fever and antibiotic treatment 0 filed at 03/18/2025 0921   Preoperative anemia (Hgb less than 10 g/dl) 0 filed at 03/18/2025 0921   Surgical incision  0 filed at 03/18/2025 0921   Duration of surgery  16 filed at 03/18/2025 0921   Emergency Procedure  0 filed at 03/18/2025 0921   ARISCAT Total Score  19 filed at 03/18/2025 0921          Darell Perioperative Risk for Myocardial Infarction or Cardiac Arrest (GENE)      Flowsheet Row Pre-Admission Testing from 6/11/2025 in [facility] Pre-Admission Testing from 3/18/2025 in [facility]   Calculated Age Score 1.5 filed at 06/11/2025 1731 1.5 filed at 03/18/2025 0922   Functional Status  0 filed at 06/11/2025 1731 0 filed at 03/18/2025 0922   ASA Class  -1.92 filed at 06/11/2025 1731 -3.29 filed at 03/18/2025 0922   Creatinine 0 filed at 06/11/2025 1731 0 filed at 03/18/2025 0922   Type of Procedure  0.21 filed at 06/11/2025 1731 0.21 filed at 03/18/2025 0922   GENE Total Score  -5.46 filed at 06/11/2025 1731 -6.83 filed at 03/18/2025 0922   GENE % 0.42 filed at 06/11/2025 1731 0.11 filed at 03/18/2025 0922              Assessment & Plan:    Neuro:  No diagnosis or significant findings on chart review or clinical presentation and evaluation.     HEENT/Airway:  RIANNA noncompliant   STOP-BANG Score-3 points moderate risk for RIANNA    Mallampati::  III    TM distance::  >3 FB    Neck ROM::  Full  Dentures-denies  Crowns-denies  Implants-denies    Cardiovascular:  CHF, CAD, HLD, HTN (atorvastatin, klonopin, clonidine, diltiazem, ezetimibe)  METS: 6.5  RCRI: 2 points, 10.1% risk for postoperative MACE   GENE: 0.42% risk for postoperative MACE  EKG -Normal sinus rhythm  Minimal voltage criteria for LVH, may be  normal variant  Nonspecific T wave abnormality  Abnormal ECG  When compared with ECG of 30-SEP-2023 11:31,  Previous ECG has undetermined rhythm, needs review  Confirmed by Anthony Randolph ([id number]) on 3/21/2025 12:57:23 PM  1. CAD - Left Heart Catheterization with Coronary Angiography   LV Gram   12/7/2021   Right dominant circulation   LMT - normal   LAD - 30-40% mid   LCX - mild luminal irregularities   RCA - miuld luminal irregularities.   LVGram - limited contrast injection. Normal wall motion. LVEF 55-60%.   CONCLUSIONS:   Mild CAD   RECOMMENDATIONS:   Evaluate for non cardiac source of chest pain.  2. Chronic diastolic heart failure.  3. Mild aortic stenosis  Moderate mitral stenosis.   Transthoracic echocardiogram-Completed 5/18/2022   1. The left ventricular systolic function is normal with a 60-65% estimated ejection fraction.   2. Spectral Doppler shows an impaired relaxation pattern of left ventricular diastolic filling.   3. There is an elevated mean left atrial pressure.   4. There is moderate mitral valve stenosis.   5. Moderately decreased mitral valve posterior leaflet mobility.   6. Mild aortic valve stenosis.    Pulmonary:  COPD (albuterol, Trelegy, singular)   ARISCAT: <26 points, 1.6% risk of in-hospital postoperative pulmonary complication  PRODIGY: Moderate risk for opioid induced respiratory depression  Smoking History-She has never smoked.  Deep breathing handout given    Renal/Urinary:  No diagnosis or significant findings on chart review or clinical presentation and evaluation, however, the patient is at increased risk of perioperative renal complications secondary to HTN. Preventative measures include BP monitoring, medication compliance, and hydration management.   CMP  Creatinine-0.85  GFR-72    Endocrine:  DM2 (farxiga, humalog, lantus)  DAP1S-7.7    Hematologic/Immunology:  No diagnosis or significant findings on chart review or clinical presentation and evaluation.  The patient is not a  Jehovah’s witness and will accept blood and blood products if medically indicated.   History of previous blood transfusions No  CBC  HGB-13.4/42.0  Caprini Score 11, patient at High for postoperative DVT. Pt supplied education/VTE handout  Anticoagulation use: Yes   History of pulmonary embolism managed with Warfarin    Gastrointestinal:   No diagnosis or significant findings on chart review or clinical presentation and evaluation.   Recreational drug use: none  Alcohol use none    Infectious disease:   No diagnosis or significant findings on chart review or clinical presentation and evaluation.     Musculoskeletal:   Compression fracture of L3 vertebra (gabapentin)  Obesity, Class II, BMI 35-39.9  - on ozempic pt aware of clear liquid diet for 24 hours prior to surgery   JHFRAT score-12 points. moderate risk for falls    Anesthesia:  ASA 3 - Patient with moderate systemic disease with functional limitations  Anticipated anesthesia-general  History of General anesthesia- yes  Complications- No anesthesia complications  No family history of anesthesia complications    Labs & Imaging ordered:  CBC, CMP, HBA1C, MRSA,   Nickel/metal allergy-negative  Shellfish allergy-negative    Discussed with patient medication instructions, NPO guidelines, and any questions or concerns.   Per her hematologist she was cleared to stop her Coumadin without bridging for at least 5 days prior to proceeding with kyphoplasty     time spent 45 minutes  All resulted testing from PAT was forwarded to the surgeon and the patient's PCP if applicable.           [1]   Past Medical History:  Diagnosis Date    Arthritis     Asthma     Cataract     CHF (congestive heart failure)     Clotting disorder (Multi)     Colon polyp     COPD (chronic obstructive pulmonary disease) (Multi)     Diabetes mellitus (Multi)     Diabetic retinopathy (Multi)     Dry eyes     Easy bruising     Fractures     GERD (gastroesophageal reflux disease)     Gestational  diabetes     Glaucoma     Hyperlipidemia     Hypertension     Obesity     Peripheral neuropathy     Pulmonary embolus     Shortness of breath     Sleep apnea     Type 2 diabetes mellitus    [2]   Past Surgical History:  Procedure Laterality Date    CATARACT EXTRACTION W/  INTRAOCULAR LENS IMPLANT Right 10/19/2023    Dr. Carson    CATARACT EXTRACTION W/  INTRAOCULAR LENS IMPLANT Left 09/28/2023    Dr. Carson    COLONOSCOPY      EYE SURGERY  03/19/2015    Eye Surgery    EYE SURGERY Right 05/20/2025    INTRAOCULAR LENS INSERTION      MR HEAD ANGIO WO IV CONTRAST  05/18/2022    MR HEAD ANGIO WO IV CONTRAST 5/18/2022 [facility]    MR NECK ANGIO WO IV CONTRAST  05/18/2022    MR NECK ANGIO WO IV CONTRAST 5/18/2022 [facility]    TONSILLECTOMY  09/28/2017    Tonsillectomy    TUBAL LIGATION  09/28/2017    Tubal Ligation   [3]   Family History  Problem Relation Name Age of Onset    Hypertension Mother Betty Gotti     Hyperlipidemia Mother Betty Gotti     Diabetes Mother Betty Gotti     Breast cancer Sister     [4]   Allergies  Allergen Reactions    Amoxicillin Diarrhea    Amoxicillin-Pot Clavulanate Diarrhea     SEVERE DIARRHEA    Hydrochlorothiazide Other     DECREASE SEXUAL LIBIDO    Low libido    Lisinopril Cough     COUGH    Losartan Cough

## 2025-06-11 NOTE — PREPROCEDURE INSTRUCTIONS
Medication List            Accurate as of June 11, 2025  5:00 PM. Always use your most recent med list.                albuterol 90 mcg/actuation inhaler  Medication Adjustments for Surgery: Take/Use as prescribed     atorvastatin 80 mg tablet  Commonly known as: Lipitor  Medication Adjustments for Surgery: Take/Use as prescribed     b complex 0.4 mg tablet  Additional Medication Adjustments for Surgery: Take last dose 7 days before surgery  Notes to patient: You should have already stopped this medication      bimatoprost 0.01 % ophthalmic solution  Commonly known as: Lumigan  Administer 1 drop into both eyes once daily at bedtime.  Medication Adjustments for Surgery: Take/Use as prescribed     brimonidine 0.2 % ophthalmic solution  Commonly known as: AlphaGAN  Medication Adjustments for Surgery: Take/Use as prescribed     cholecalciferol 25 mcg (1,000 units) capsule  Commonly known as: Vitamin D-3  Additional Medication Adjustments for Surgery: Take last dose 7 days before surgery  Notes to patient: You should have already stopped this medication      clonazePAM 1 mg tablet  Commonly known as: KlonoPIN  Medication Adjustments for Surgery: Take/Use as prescribed     cloNIDine 0.1 mg tablet  Commonly known as: Catapres  Medication Adjustments for Surgery: Take/Use as prescribed     dapagliflozin propanediol 10 mg tablet  Commonly known as: Farxiga  Medication Adjustments for Surgery: Take last dose 3 days before surgery  Notes to patient: You should have already stopped this medication      dilTIAZem  mg 24 hr tablet  Commonly known as: Cardizem LA  Medication Adjustments for Surgery: Take/Use as prescribed     dorzolamide 2 % ophthalmic solution  Commonly known as: Trusopt  Medication Adjustments for Surgery: Take/Use as prescribed     ezetimibe 10 mg tablet  Commonly known as: Zetia  Medication Adjustments for Surgery: Take last dose 1 day (24 hours) before surgery  Notes to patient: Last dose  preoperatively if taken in the mornings on 6/12/25 if taken in the evenings on 6/11/25     fluticasone 50 mcg/actuation nasal spray  Commonly known as: Flonase  Medication Adjustments for Surgery: Take/Use as prescribed     gabapentin 600 mg tablet  Commonly known as: Neurontin  Medication Adjustments for Surgery: Take/Use as prescribed     HumaLOG U-100 Insulin 100 unit/mL injection  Generic drug: insulin lispro  Medication Adjustments for Surgery: Do Not take on the morning of surgery  Notes to patient: Hold this the day of surgery      hydrALAZINE 50 mg tablet  Commonly known as: Apresoline  Take 0.5 tablets (25 mg) by mouth 3 times a day. *Please note change in dose  Medication Adjustments for Surgery: Take/Use as prescribed     Lantus U-100 Insulin 100 unit/mL injection  Generic drug: insulin glargine  Additional Medication Adjustments for Surgery: Other (Comment)  Notes to patient: Please take your lantus as prescribed     metoprolol tartrate 100 mg tablet  Commonly known as: Lopressor  Medication Adjustments for Surgery: Take/Use as prescribed     montelukast 10 mg tablet  Commonly known as: Singulair  Medication Adjustments for Surgery: Do Not take on the morning of surgery     omega-3 acid ethyl esters 1 gram capsule  Commonly known as: Lovaza  Additional Medication Adjustments for Surgery: Take last dose 7 days before surgery  Notes to patient: You should have already stopped this medication      potassium chloride CR 20 mEq ER tablet  Commonly known as: Klor-Con M20  Medication Adjustments for Surgery: Do Not take on the morning of surgery     primidone 50 mg tablet  Commonly known as: Mysoline  Take 2 tablets (100 mg) by mouth 2 times a day.  Medication Adjustments for Surgery: Take/Use as prescribed     semaglutide 1 mg/dose (4 mg/3 mL) pen injector  Commonly known as: OZEMPIC  Medication Adjustments for Surgery: Do Not take on the morning of surgery     torsemide 20 mg tablet  Commonly known as:  Demadex  Take 1 tablet (20 mg) by mouth once daily.  Medication Adjustments for Surgery: Take/Use as prescribed     Trelegy Ellipta 200-62.5-25 mcg blister with device  Generic drug: fluticasone-umeclidin-vilanter  Medication Adjustments for Surgery: Take/Use as prescribed     warfarin 5 mg tablet  Commonly known as: Coumadin  Additional Medication Adjustments for Surgery: Other (Comment)  Notes to patient: your provider is allowing you to hold this for 5 days prior to surgery                 The medication bridging plan has been discussed with the surgeon and the patient has been informed of the plan.     NPO Instructions:     -Because you are on a GLP-1 medication per  preoperative protocol you are to hold solid food and non-clear liquids for 24 hours preoperatively.     -Clear liquids held 2 hrs. prior to procedure.    -Examples of clear liquids include black tea/coffee with no cream/sugar, water, apple juice, and electrolyte drinks (please avoid red drinks)    -If you are diabetic please follow preoperative diabetic glucose management instructions below. If you have any further questions please contact our pre-admission testing department at [facility] [place] at [phone].    -Patients on GLP1 agonists (because of the delayed gastric emptying effect) should do an “extended fast”. Specifically, this means NPO for solids and nonclear liquids for 24 hrs. prior to surgery. High-caloric clear liquids (such as used in ERAS protocols) are stopped at 8 hrs. prior to procedure.     Seven/Six Days before Surgery:  Review your medication instructions, stop indicated medications  Five Days before Surgery:  Review your medication instructions, stop indicated medications  Three Days before Surgery:  Review your medication instructions, stop indicated medications  The Day before Surgery:  Hold all solid food and non-clear liquids x 24 hours before your procedure  Hold clear liquids 2 hours before your procedure  No smoking or alcohol  use 24 hours before surgery  Start using 0.12% CHG mouthwash  Review your medication instructions, stop indicated medications  You will be contacted regarding the time of your arrival to facility and surgery time  Day of Surgery:  Review your medication instructions, take indicated medications  If you have diabetes, please check your fasting blood sugar upon awakening.  If fasting blood sugar is <80 mg/dl, drink 100 ml of apple juice, time limit of 2 hours before  Wear  comfortable loose fitting clothing  Do not use moisturizers, creams, lotions or perfume  All jewelry and valuables should be left at home     CONTACT SURGEON'S OFFICE IF YOU DEVELOP:  * Fever = 100.4 F   * New respiratory symptoms (e.g. cough, shortness of breath, respiratory distress, sore throat)  * Recent loss of taste or smell  *Flu like symptoms such as headache, fatigue or gastrointestinal symptoms  * You develop any open sores, shingles, burning or painful urination   AND/OR:  * You no longer wish to have the surgery.  * Any other personal circumstances change that may lead to the need to cancel or defer this surgery.  *You were admitted to any hospital within one week of your planned procedure.     SMOKING:  *Quitting smoking can make a huge difference to your health and recovery from surgery.    *If you need help with quitting, call 0-695-QUIT-NOW.     THE DAY BEFORE SURGERY:  *You may have up to TEN OUNCES of clear liquids on your day of surgery until TWO hours before your instructed ARRIVAL TIME to hospital. This includes water, black tea/coffee, (no milk or cream) apple juice, clear broth and electrolyte drinks (Gatorade). Please avoid clear liquids that are red in color.   *You may chew gum/mints up to TWO hours before your surgery/procedure.    SURGICAL TIME:  *You will be contacted between 2 p.m. and 3 p.m. the business day before your surgery with your arrival time.  *If you haven't received a call by 3pm, call [phone]  [phone]  *Scheduled surgery times may change and you will be notified if this occurs-check your personal voicemail for any updates.     ON THE MORNING OF SURGERY:  *Wear comfortable, loose fitting clothing.   *Do not use moisturizers, creams, lotions or perfume.  *All jewelry and valuables should be left at home.  *Prosthetic devices such as contact lenses, hearing aids, dentures, eyelash extensions, hairpins and body piercing must be removed before surgery.     BRING WITH YOU:  *Photo ID and insurance card  *Current list of medications and allergies  *Pacemaker/Defibrillator/Heart stent cards  *CPAP machine and mask  *Slings/splints/crutches  *Copy of your complete Advanced Directive/DHPOA-if applicable  *Neurostimulator implant remote     PARKING AND ARRIVAL:  *Check in at the Main Entrance desk and let them know you are here for surgery.     IF YOU ARE HAVING OUTPATIENT/SAME DAY SURGERY:  *A responsible adult MUST accompany you at the time of discharge and stay with you for 24 hours after your surgery.  *You may NOT drive yourself home after surgery.  *You may use a taxi or ride sharing service ([employer], Uber) to return home ONLY if you are accompanied by a friend or family member.  *Instructions for resuming your medications will be provided by your surgeon.     Thank you for coming to Pre Admission testing.      If I have prescribed medication please don't forget to  at your pharmacy.      Any questions about today's visit call [phone] and leave a message in the general mailbox.     Patient instructed to ambulate as soon as possible postoperatively to decrease thromboembolic risk.        Thank you for visiting the [facility].  If you have any changes to your health condition, please call the surgeons office to alert them and give them details of your symptoms.        Preoperative Fasting Guidelines     Why must I stop eating and drinking near surgery time?  With sedation, food  or liquid in your stomach can enter your lungs causing serious complications  Increases nausea and vomiting     When do I need to stop eating and drinking before my surgery?  Do not eat any food/nonclear liquid for 24 hours prior to your surgery.  You can have clear liquids up until 2 hours before your surgery        Additional Instructions:      The Day before Surgery:  -Review your medication instructions, stop indicated medications  -You will be contacted in the evening regarding the time of your arrival to facility and surgery time     Day of Surgery:  -Review your medication instructions, take indicated medications  -Wear comfortable loose fitting clothing  -Do not use moisturizers, creams, lotions or perfume  -All jewelry and valuables should be left at home                   Preoperative Brain Exercises     What are brain exercises?  A brain exercise is any activity that engages your thinking (cognitive) skills.     What types of activities are considered brain exercises?  Jigsaw puzzles, crossword puzzles, word jumble, memory games, word search, and many more.  Many can be found free online or on your phone via a mobile princess.     Why should I do brain exercises before my surgery?  More recent research has shown brain exercise before surgery can lower the risk of postoperative delirium (confusion) which can be especially important for older adults.  Patients who did brain exercises for 5 to 10 minutes/day the days before surgery, cut their risk of postoperative delirium in half up to 1 week after surgery.                         [facility]     Preoperative Deep Breathing Exercises     Why it is important to do deep breathing exercises before my surgery?  Deep breathing exercises strengthen your breathing muscles.  This helps you to recover after your surgery and decreases the chance of breathing complications.        How are the deep breathing exercises done?  Sit straight with your  back supported.  Breathe in deeply and slowly through your nose. Your lower rib cage should expand and your abdomen may move forward.  Hold that breath for 3 to 5 seconds.  Breathe out through pursed lips, slowly and completely.  Rest and repeat 10 times every hour while awake.  Rest longer if you become dizzy or lightheaded.                      [facility]     Preoperative Deep Breathing Exercises     Why it is important to do deep breathing exercises before my surgery?  Deep breathing exercises strengthen your breathing muscles.  This helps you to recover after your surgery and decreases the chance of breathing complications.        How are the deep breathing exercises done?  Sit straight with your back supported.  Breathe in deeply and slowly through your nose. Your lower rib cage should expand and your abdomen may move forward.  Hold that breath for 3 to 5 seconds.  Breathe out through pursed lips, slowly and completely.  Rest and repeat 10 times every hour while awake.  Rest longer if you become dizzy or lightheaded.        Patient Information: Incentive Spirometer  What is an incentive spirometer?  An incentive spirometer is a device used before and after surgery to “exercise” your lungs.  It helps you to take deeper breaths to expand your lungs.  Below is an example of a basic incentive spirometer.  The device you receive may differ slightly but they all function the same.    Why do I need to use an incentive spirometer?  Using your incentive spirometer prepares your lungs for surgery and helps prevent lung problems after surgery.  How do I use my incentive spirometer?  When you're using your incentive spirometer, make sure to breathe through your mouth. If you breathe through your nose, the incentive spirometer won't work properly. You can hold your nose if you have trouble.  If you feel dizzy at any time, stop and rest. Try again at a later time.  Follow the steps below:  Set up  your incentive spirometer, expand the flexible tubing and connect to the outlet.  Sit upright in a chair or bed. Hold the incentive spirometer at eye level.   Put the mouthpiece in your mouth and close your lips tightly around it. Slowly breathe out (exhale) completely.  Breathe in (inhale) slowly through your mouth as deeply as you can. As you take a breath, you will see the piston rise inside the large column. While the piston rises, the indicator should move upwards. It should stay in between the 2 arrows (see Figure).  Try to get the piston as high as you can, while keeping the indicator between the arrows.   If the indicator doesn't stay between the arrows, you're breathing either too fast or too slow.  When you get it as high as you can, hold your breath for 10 seconds, or as long as possible. While you're holding your breath, the piston will slowly fall to the base of the spirometer.  Once the piston reaches the bottom of the spirometer, breathe out slowly through your mouth. Rest for a few seconds.  Repeat 10 times. Try to get the piston to the same level with each breath.  Repeat every hour while awake  You can carefully clean the outside of the mouthpiece with an alcohol wipe or soap and water.       Patient and Family Education             Ways You Can Help Prevent Blood Clots                    This handout explains some simple things you can do to help prevent blood clots.      Blood clots are blockages that can form in the body's veins. When a blood clot forms in your deep veins, it may be called a deep vein thrombosis, or DVT for short. Blood clots can happen in any part of the body where blood flows, but they are most common in the arms and legs. If a piece of a blood clot breaks free and travels to the lungs, it is called a pulmonary embolus (PE). A PE can be a very serious problem.         Being in the hospital or having surgery can raise your chances of getting a blood clot because you may not be  well enough to move around as much as you normally do.         Ways you can help prevent blood clots in the hospital           Wearing SCDs. SCDs stands for Sequential Compression Devices.   SCDs are special sleeves that wrap around your legs  They attach to a pump that fills them with air to gently squeeze your legs every few minutes.   This helps return the blood in your legs to your heart.   SCDs should only be taken off when walking or bathing.   SCDs may not be comfortable, but they can help save your life.                                            Wearing compression stockings - if your doctor orders them. These special snug fitting stockings gently squeeze your legs to help blood flow.       Walking. Walking helps move the blood in your legs.   If your doctor says it is ok, try walking the halls at least   5 times a day. Ask us to help you get up, so you don't fall.      Taking any blood thinning medicines your doctor orders.        Page 1 of 2            [facility]; 3/23   Ways you can help prevent blood clots at home         Wearing compression stockings - if your doctor orders them. ? Walking - to help move the blood in your legs.       Taking any blood thinning medicines your doctor orders.      Signs of a blood clot or PE        Tell your doctor or nurse know right away if you have of the problems listed below.    If you are at home, seek medical care right away. Call 911 for chest pain or problems breathing.          Signs of a blood clot (DVT) - such as pain,  swelling, redness or warmth in your arm or leg      Signs of a pulmonary embolism (PE) - such as chest pain or feeling short of breath    Patient Information: Pre-Operative Infection Prevention Measures     Why did I have my nose, under my arms, and groin swabbed?  The purpose of the swab is to identify Staphylococcus aureus inside your nose or on your skin.  The swab was sent to the laboratory for culture.  A positive swab/culture  for Staphylococcus aureus is called colonization or carriage.      What is Staphylococcus aureus?  Staphylococcus aureus, also known as “staph”, is a germ found on the skin or in the nose of healthy people.  Sometimes Staphylococcus aureus can get into the body and cause an infection.  This can be minor (such as pimples, boils, or other skin problems).  It might also be serious (such as a blood infection, pneumonia, or a surgical site infection).    What is Staphylococcus aureus colonization or carriage?  Colonization or carriage means that a person has the germ but is not sick from it.  These bacteria can be spread on the hands or when breathing or sneezing.    How is Staphylococcus aureus spread?  It is most often spread by close contact with a person or item that carries it.    What happens if my culture is positive for Staphylococcus aureus?  Your doctor/medical team will use this information to guide any antibiotic treatment which may be necessary.  Regardless of the culture results, we will clean the inside of your nose with a betadine swab just before you have your surgery.      Will I get an infection if I have Staphylococcus aureus in my nose or on my skin?  Anyone can get an infection with Staphylococcus aureus.  However, the best way to reduce your risk of infection is to follow the instructions provided to you for the use of your CHG soap and dental rinse.        Patient Information: Oral/Dental Rinse    What is oral/dental rinse?   It is a mouthwash. It is a way of cleaning the mouth with a germ-killing solution before your surgery.  The solution contains chlorhexidine, commonly known as CHG.   It is used inside the mouth to kill a bacteria known as Staphylococcus aureus.  Let your doctor know if you are allergic to Chlorhexidine.    Why do I need to use CHG oral/dental rinse?  The CHG oral/dental rinse helps to kill a bacteria in your mouth known as Staphylococcus aureus.     This reduces the risk of  infection at the surgical site.      Using your CHG oral/dental rinse  STEPS:  Use your CHG oral/dental rinse after you brush your teeth the night before (at bedtime) and the morning of your surgery.  Follow all directions on your prescription label.    Use the cap on the container to measure 15ml   Swish (gargle if you can) the mouthwash in your mouth for at least 30 seconds, (do not swallow) and spit out  After you use your CHG rinse, do not rinse your mouth with water, drink or eat.  Please refer to the prescription label for the appropriate time to resume oral intake      What side effects might I have using the CHG oral/dental rinse?  CHG rinse will stick to plaque on the teeth.  Brush and floss just before use.  Teeth brushing will help avoid staining of plaque during use.    We have sent a prescription for CHG 0.12% mouthwash to your preferred pharmacy.  If you have not already, Please  your prescription and start using five days before surgery.  Follow the instruction sheet provided to you at your CPM/PAT appointment.  Please contact [facility] if you do not receive your CHG mouthwash prescription.      Patient Information: Home Preoperative Antibacterial Shower      What is a home preoperative antibacterial shower?  This shower is a way of cleaning the skin with a germ-killing solution before surgery.  The solution contains chlorhexidine, commonly known as CHG.  CHG is a skin cleanser with germ-killing ability.  Let your doctor know if you are allergic to chlorhexidine.    Why do I need to take a preoperative antibacterial shower?  Skin is not sterile.  It is best to try to make your skin as free of germs as possible before surgery.  Proper cleansing with a germ-killing soap before surgery can lower the number of germs on your skin.  This helps to reduce the risk of infection at the surgical site.  Following the instructions listed below will help you prepare your skin for surgery.      How do I use the  solution?  Steps:  Begin using your CHG soap 1 days before your scheduled surgery    First, wash and rinse your hair using the CHG soap. Keep CHG soap away from ear canals and eyes.  Rinse completely, do not condition.  Hair extensions should be removed.  Wash your face with your normal soap and rinse.    Apply the CHG solution to a clean wet washcloth.  Turn the water off or move away from the water spray to avoid premature rinsing of the CHG soap as you are applying.   Firmly lather your entire body from the neck down.  Do not use on your face.  Pay special attention to the area(s) where your incision(s) will be located unless they are on your face.  Avoid scrubbing your skin too hard.  The important point is to have the CHG soap sit on your skin for 3 minutes.    When the 3 minutes are up, turn on the water and rinse the CHG solution off your body completely.   DO NOT wash with regular soap after you have used the CHG soap solution  Pat yourself dry with a clean, freshly-laundered towel.  DO NOT apply powders, deodorants, or lotions.  Dress in clean, freshly laundered nightclothes.    Be sure to sleep with clean, freshly laundered sheets.  Be aware that CHG will cause stains on fabrics; if you wash them with bleach after use.  Rinse your washcloth and other linens that have contact with CHG completely.  Use only non-chlorine detergents to launder the items used.   The morning of surgery is the fifth day.  Repeat the above steps and dress in clean comfortable clothing     Whom should I contact if I have any questions regarding the use of CHG soap?  Call the [facility] at [phone] if you have any questions.      Preoperative Clearances  -If you are informed by the preadmission testing team that you need clearance for your surgery, please reach out to the provider in question to assisting accommodating obtaining your clearance.   -Please have  you provider fax your clearance letter to [phone] if needed.   -A blank clearance letter will be provided to you if indicated.     Anticoagulation  -If you are on oral anticoagulation such as Coumadin, Eliquis, Xarelto, Plavix, Brilinta, Pradaxa; we will need to obtain preoperative anticoagulation instructions from the prescribing provider.   -We will reach out to the prescriber but do encourage you to call their office as well as it will increase the chances of getting the necessary information.   -We will contact you with the instruction once we obtain them.

## 2025-06-13 ENCOUNTER — ANESTHESIA (OUTPATIENT)
Dept: OPERATING ROOM | Facility: HOSPITAL | Age: 75
End: 2025-06-13
Payer: MEDICARE

## 2025-06-13 ENCOUNTER — APPOINTMENT (OUTPATIENT)
Dept: RADIOLOGY | Facility: HOSPITAL | Age: 75
End: 2025-06-13
Payer: MEDICARE

## 2025-06-13 ENCOUNTER — HOSPITAL ENCOUNTER (OUTPATIENT)
Facility: HOSPITAL | Age: 75
Setting detail: OUTPATIENT SURGERY
Discharge: HOME | End: 2025-06-13
Attending: PAIN MEDICINE | Admitting: PAIN MEDICINE
Payer: MEDICARE

## 2025-06-13 ENCOUNTER — ANESTHESIA EVENT (OUTPATIENT)
Dept: OPERATING ROOM | Facility: HOSPITAL | Age: 75
End: 2025-06-13
Payer: MEDICARE

## 2025-06-13 VITALS
HEIGHT: 65 IN | HEART RATE: 68 BPM | TEMPERATURE: 97 F | DIASTOLIC BLOOD PRESSURE: 81 MMHG | RESPIRATION RATE: 18 BRPM | SYSTOLIC BLOOD PRESSURE: 164 MMHG | OXYGEN SATURATION: 96 % | WEIGHT: 210.98 LBS | BODY MASS INDEX: 35.15 KG/M2

## 2025-06-13 DIAGNOSIS — S32.030S COMPRESSION FRACTURE OF L3 VERTEBRA, SEQUELA: Primary | ICD-10-CM

## 2025-06-13 LAB
GLUCOSE BLD MANUAL STRIP-MCNC: 127 MG/DL (ref 74–99)
GLUCOSE BLD MANUAL STRIP-MCNC: 136 MG/DL (ref 74–99)
INR PPP: 1.2 (ref 0.9–1.2)
PROTHROMBIN TIME: 11.7 SECONDS (ref 9.3–12.7)
STAPHYLOCOCCUS SPEC CULT: NORMAL

## 2025-06-13 PROCEDURE — 2500000004 HC RX 250 GENERAL PHARMACY W/ HCPCS (ALT 636 FOR OP/ED)

## 2025-06-13 PROCEDURE — C1713 ANCHOR/SCREW BN/BN,TIS/BN: HCPCS | Performed by: PAIN MEDICINE

## 2025-06-13 PROCEDURE — 82947 ASSAY GLUCOSE BLOOD QUANT: CPT

## 2025-06-13 PROCEDURE — 3700000001 HC GENERAL ANESTHESIA TIME - INITIAL BASE CHARGE: Performed by: PAIN MEDICINE

## 2025-06-13 PROCEDURE — 7100000001 HC RECOVERY ROOM TIME - INITIAL BASE CHARGE: Performed by: PAIN MEDICINE

## 2025-06-13 PROCEDURE — 7100000002 HC RECOVERY ROOM TIME - EACH INCREMENTAL 1 MINUTE: Performed by: PAIN MEDICINE

## 2025-06-13 PROCEDURE — 2780000003 HC OR 278 NO HCPCS: Performed by: PAIN MEDICINE

## 2025-06-13 PROCEDURE — 22511 PERQ LUMBOSACRAL INJECTION: CPT | Performed by: PAIN MEDICINE

## 2025-06-13 PROCEDURE — 7100000010 HC PHASE TWO TIME - EACH INCREMENTAL 1 MINUTE: Performed by: PAIN MEDICINE

## 2025-06-13 PROCEDURE — 3600000009 HC OR TIME - EACH INCREMENTAL 1 MINUTE - PROCEDURE LEVEL FOUR: Performed by: PAIN MEDICINE

## 2025-06-13 PROCEDURE — 36415 COLL VENOUS BLD VENIPUNCTURE: CPT | Performed by: ANESTHESIOLOGY

## 2025-06-13 PROCEDURE — 85610 PROTHROMBIN TIME: CPT | Performed by: ANESTHESIOLOGY

## 2025-06-13 PROCEDURE — 2500000004 HC RX 250 GENERAL PHARMACY W/ HCPCS (ALT 636 FOR OP/ED): Performed by: STUDENT IN AN ORGANIZED HEALTH CARE EDUCATION/TRAINING PROGRAM

## 2025-06-13 PROCEDURE — 3700000002 HC GENERAL ANESTHESIA TIME - EACH INCREMENTAL 1 MINUTE: Performed by: PAIN MEDICINE

## 2025-06-13 PROCEDURE — 2500000004 HC RX 250 GENERAL PHARMACY W/ HCPCS (ALT 636 FOR OP/ED): Performed by: PAIN MEDICINE

## 2025-06-13 PROCEDURE — 3600000004 HC OR TIME - INITIAL BASE CHARGE - PROCEDURE LEVEL FOUR: Performed by: PAIN MEDICINE

## 2025-06-13 PROCEDURE — 2550000001 HC RX 255 CONTRASTS: Performed by: PAIN MEDICINE

## 2025-06-13 PROCEDURE — 7100000009 HC PHASE TWO TIME - INITIAL BASE CHARGE: Performed by: PAIN MEDICINE

## 2025-06-13 DEVICE — BONE CEMENT CX01B XPEDE W MIXER US
Type: IMPLANTABLE DEVICE | Site: SPINE LUMBAR | Status: FUNCTIONAL
Brand: KYPHON® XPEDE™ BONE CEMENT AND KYPHON® MIXER PACK

## 2025-06-13 DEVICE — CEMENTSYS KPE1003-CDS FF EXPRESS 15/2
Type: IMPLANTABLE DEVICE | Site: SPINE LUMBAR | Status: FUNCTIONAL
Brand: KYPHOPAK™ EXPRESS™ TRAY

## 2025-06-13 RX ORDER — HYDRALAZINE HYDROCHLORIDE 20 MG/ML
5 INJECTION INTRAMUSCULAR; INTRAVENOUS EVERY 30 MIN PRN
Status: DISCONTINUED | OUTPATIENT
Start: 2025-06-13 | End: 2025-06-13 | Stop reason: HOSPADM

## 2025-06-13 RX ORDER — ONDANSETRON HYDROCHLORIDE 2 MG/ML
INJECTION, SOLUTION INTRAVENOUS AS NEEDED
Status: DISCONTINUED | OUTPATIENT
Start: 2025-06-13 | End: 2025-06-13

## 2025-06-13 RX ORDER — HYDROCODONE BITARTRATE AND ACETAMINOPHEN 5; 325 MG/1; MG/1
1 TABLET ORAL 2 TIMES DAILY PRN
Qty: 10 TABLET | Refills: 0 | Status: SHIPPED | OUTPATIENT
Start: 2025-06-13 | End: 2025-06-18

## 2025-06-13 RX ORDER — MIDAZOLAM HYDROCHLORIDE 1 MG/ML
INJECTION, SOLUTION INTRAMUSCULAR; INTRAVENOUS AS NEEDED
Status: DISCONTINUED | OUTPATIENT
Start: 2025-06-13 | End: 2025-06-13

## 2025-06-13 RX ORDER — LIDOCAINE HYDROCHLORIDE 10 MG/ML
INJECTION, SOLUTION EPIDURAL; INFILTRATION; INTRACAUDAL; PERINEURAL AS NEEDED
Status: DISCONTINUED | OUTPATIENT
Start: 2025-06-13 | End: 2025-06-13

## 2025-06-13 RX ORDER — HYDROMORPHONE HYDROCHLORIDE 0.2 MG/ML
0.2 INJECTION INTRAMUSCULAR; INTRAVENOUS; SUBCUTANEOUS EVERY 5 MIN PRN
Status: DISCONTINUED | OUTPATIENT
Start: 2025-06-13 | End: 2025-06-13 | Stop reason: HOSPADM

## 2025-06-13 RX ORDER — FENTANYL CITRATE 50 UG/ML
INJECTION, SOLUTION INTRAMUSCULAR; INTRAVENOUS AS NEEDED
Status: DISCONTINUED | OUTPATIENT
Start: 2025-06-13 | End: 2025-06-13

## 2025-06-13 RX ORDER — MEPERIDINE HYDROCHLORIDE 25 MG/ML
12.5 INJECTION INTRAMUSCULAR; INTRAVENOUS; SUBCUTANEOUS EVERY 10 MIN PRN
Status: DISCONTINUED | OUTPATIENT
Start: 2025-06-13 | End: 2025-06-13 | Stop reason: HOSPADM

## 2025-06-13 RX ORDER — SODIUM CHLORIDE, SODIUM LACTATE, POTASSIUM CHLORIDE, CALCIUM CHLORIDE 600; 310; 30; 20 MG/100ML; MG/100ML; MG/100ML; MG/100ML
100 INJECTION, SOLUTION INTRAVENOUS CONTINUOUS
Status: DISCONTINUED | OUTPATIENT
Start: 2025-06-13 | End: 2025-06-13 | Stop reason: HOSPADM

## 2025-06-13 RX ORDER — ALBUTEROL SULFATE 0.83 MG/ML
2.5 SOLUTION RESPIRATORY (INHALATION) ONCE AS NEEDED
Status: DISCONTINUED | OUTPATIENT
Start: 2025-06-13 | End: 2025-06-13 | Stop reason: HOSPADM

## 2025-06-13 RX ORDER — OXYCODONE HYDROCHLORIDE 5 MG/1
5 TABLET ORAL EVERY 4 HOURS PRN
Status: DISCONTINUED | OUTPATIENT
Start: 2025-06-13 | End: 2025-06-13 | Stop reason: HOSPADM

## 2025-06-13 RX ORDER — DIPHENHYDRAMINE HYDROCHLORIDE 50 MG/ML
12.5 INJECTION, SOLUTION INTRAMUSCULAR; INTRAVENOUS ONCE AS NEEDED
Status: DISCONTINUED | OUTPATIENT
Start: 2025-06-13 | End: 2025-06-13 | Stop reason: HOSPADM

## 2025-06-13 RX ORDER — ROCURONIUM BROMIDE 10 MG/ML
INJECTION, SOLUTION INTRAVENOUS AS NEEDED
Status: DISCONTINUED | OUTPATIENT
Start: 2025-06-13 | End: 2025-06-13

## 2025-06-13 RX ORDER — LIDOCAINE HYDROCHLORIDE 5 MG/ML
INJECTION, SOLUTION INFILTRATION; PERINEURAL AS NEEDED
Status: DISCONTINUED | OUTPATIENT
Start: 2025-06-13 | End: 2025-06-13 | Stop reason: HOSPADM

## 2025-06-13 RX ORDER — CLINDAMYCIN PHOSPHATE 900 MG/50ML
900 INJECTION, SOLUTION INTRAVENOUS ONCE
Status: COMPLETED | OUTPATIENT
Start: 2025-06-13 | End: 2025-06-13

## 2025-06-13 RX ORDER — METOCLOPRAMIDE HYDROCHLORIDE 5 MG/ML
10 INJECTION INTRAMUSCULAR; INTRAVENOUS ONCE AS NEEDED
Status: DISCONTINUED | OUTPATIENT
Start: 2025-06-13 | End: 2025-06-13 | Stop reason: HOSPADM

## 2025-06-13 RX ORDER — ONDANSETRON HYDROCHLORIDE 2 MG/ML
4 INJECTION, SOLUTION INTRAVENOUS ONCE AS NEEDED
Status: DISCONTINUED | OUTPATIENT
Start: 2025-06-13 | End: 2025-06-13 | Stop reason: HOSPADM

## 2025-06-13 RX ORDER — FENTANYL CITRATE 50 UG/ML
25 INJECTION, SOLUTION INTRAMUSCULAR; INTRAVENOUS EVERY 5 MIN PRN
Status: DISCONTINUED | OUTPATIENT
Start: 2025-06-13 | End: 2025-06-13 | Stop reason: HOSPADM

## 2025-06-13 RX ORDER — PROPOFOL 10 MG/ML
INJECTION, EMULSION INTRAVENOUS AS NEEDED
Status: DISCONTINUED | OUTPATIENT
Start: 2025-06-13 | End: 2025-06-13

## 2025-06-13 RX ADMIN — FENTANYL CITRATE 50 MCG: 50 INJECTION, SOLUTION INTRAMUSCULAR; INTRAVENOUS at 08:26

## 2025-06-13 RX ADMIN — CLINDAMYCIN PHOSPHATE 900 MG: 900 INJECTION, SOLUTION INTRAVENOUS at 08:36

## 2025-06-13 RX ADMIN — MIDAZOLAM 1 MG: 1 INJECTION INTRAMUSCULAR; INTRAVENOUS at 08:17

## 2025-06-13 RX ADMIN — DEXAMETHASONE SODIUM PHOSPHATE 4 MG: 4 INJECTION INTRA-ARTICULAR; INTRALESIONAL; INTRAMUSCULAR; INTRAVENOUS; SOFT TISSUE at 08:37

## 2025-06-13 RX ADMIN — ROCURONIUM BROMIDE 50 MG: 10 INJECTION, SOLUTION INTRAVENOUS at 08:26

## 2025-06-13 RX ADMIN — SUGAMMADEX 200 MG: 100 INJECTION, SOLUTION INTRAVENOUS at 10:09

## 2025-06-13 RX ADMIN — ONDANSETRON 4 MG: 2 INJECTION, SOLUTION INTRAMUSCULAR; INTRAVENOUS at 10:00

## 2025-06-13 RX ADMIN — SODIUM CHLORIDE, POTASSIUM CHLORIDE, SODIUM LACTATE AND CALCIUM CHLORIDE: 600; 310; 30; 20 INJECTION, SOLUTION INTRAVENOUS at 08:18

## 2025-06-13 RX ADMIN — LIDOCAINE HYDROCHLORIDE 5 ML: 10 INJECTION, SOLUTION EPIDURAL; INFILTRATION; INTRACAUDAL; PERINEURAL at 08:25

## 2025-06-13 RX ADMIN — FENTANYL CITRATE 25 MCG: 50 INJECTION, SOLUTION INTRAMUSCULAR; INTRAVENOUS at 09:32

## 2025-06-13 RX ADMIN — FENTANYL CITRATE 25 MCG: 50 INJECTION, SOLUTION INTRAMUSCULAR; INTRAVENOUS at 09:56

## 2025-06-13 RX ADMIN — PROPOFOL 150 MG: 10 INJECTION, EMULSION INTRAVENOUS at 08:24

## 2025-06-13 SDOH — HEALTH STABILITY: MENTAL HEALTH: CURRENT SMOKER: 0

## 2025-06-13 ASSESSMENT — PAIN - FUNCTIONAL ASSESSMENT
PAIN_FUNCTIONAL_ASSESSMENT: 0-10

## 2025-06-13 ASSESSMENT — PAIN SCALES - GENERAL
PAINLEVEL_OUTOF10: 3
PAIN_LEVEL: 3
PAINLEVEL_OUTOF10: 2
PAINLEVEL_OUTOF10: 0 - NO PAIN
PAINLEVEL_OUTOF10: 3
PAINLEVEL_OUTOF10: 5 - MODERATE PAIN
PAINLEVEL_OUTOF10: 0 - NO PAIN

## 2025-06-13 ASSESSMENT — PAIN DESCRIPTION - DESCRIPTORS
DESCRIPTORS: DULL;ACHING
DESCRIPTORS: DULL;ACHING

## 2025-06-13 NOTE — DISCHARGE INSTRUCTIONS
DISCHARGE INSTRUCTIONS FOR INJECTIONS     You underwent lumbar kyphoplasty today    After most injections, it is recommended that you relax and limit your activity for the remainder of the day unless you have been told otherwise by your pain physician.  You should not drive a car, operate machinery, or make important legal decisions unless otherwise directed by your pain physician.  You may resume your normal activity, including exercise, tomorrow.      Keep a written pain diary of how much pain relief you experienced following the injection procedure and the length of time of pain relief you experienced pain relief. Following diagnostic injections like medial branch nerve blocks, sacroiliac joint blocks, stellate ganglion injections and other blocks, it is very important you record the specific amount of pain relief you experienced immediately after the injectionand how long it lasted. Your doctor will ask you for this information at your follow up visit.     For all injections, please keep the injection site dry and inspect the site for a couple of days. You may remove the Band-Aid the day of the injection at any time.     Some discomfort, bruising or slight swelling may occur at the injection site. This is not abnormal if it occurs.  If needed you may:    -Take over the counter medication such as Tylenol or Motrin.   -Apply an ice pack for 30 minutes, 2 to 3 times a day for the first 24 hours.     You may shower today; no soaking baths, hot tubs, whirlpools or swimming pools for two days.      If you are given steroids in your injection, it may take 3-5 days for the steroid medication to take effect. You may notice a worsening of your symptoms for 1-2 days after the injection. This is not abnormal.  You may use acetaminophen, ibuprofen, or prescription medication that your doctor may have prescribed for you if you need to do so.     A few common side effects of steroids include facial flushing, sweating,  restlessness, irritability,difficulty sleeping, increase in blood sugar, and increased blood pressure. If you have diabetes, please monitor your blood sugar at least once a day for at least 5 days. If you have poorly controlled high blood pressure, monitoryour blood pressure for at least 2 days and contact your primary care physician if these numbers are unusually high for you.      If you take aspirin or non-steroidal anti-inflammatory drugs (examples are Motrin, Advil, ibuprofen, Naprosyn, Voltaren, Relafen, etc.) you may restart these this evening, but stop taking it 3 days before your next appointment, unless instructed otherwiseby your physician.      You do not need to discontinue non-aspirin-containing pain medications prior to an injection (examples: Celebrex, tramadol, hydrocodone and acetaminophen).      If you take a blood thinning medication (Coumadin, Lovenox, Fragmin,Ticlid, Plavix, Pradaxa, etc.), please discuss this with your primary care physician/cardiologist and your pain physician. These medications MUST be discontinued before you can have an injection safely, without the risk of uncontrolled bleeding. If these medications are not discontinued for an appropriate period of time, you will not be able to receivean injection. Please adhere to instructions given to you about when to restart your blood thinning medication. If you have any questions please reach out to our team.    If you are taking Coumadin, please have your INR checked the morning of your procedure and bring the result to your appointment unless otherwise instructed. If your INR is over 1.2, your injection may need to be rescheduled to avoid uncontrolled bleeding from the needle placement.     Call [facility]  and ask for Pain Management at [phone] between 8am-4pm Monday - Friday if you are experiencing the following:    If you received an epidural or spinal injection:    -Headache that doesnot go away with medicine, is worse  when sitting or standing up, and is greatly relieved upon lying down.   -Severe pain worse than or different than your baseline pain.   -Chills or fever (101º F or greater).   -Drainage or signs of infection at the injection site     Go directly to the Emergency Department if you are experiencing the following and received an epidural or spinal injection:   -Abrupt weakness or progressive weakness in your legs that starts after you leave the clinic.   -Abrupt severe or worsening numbness in your legs.   -Inability to urinate after the injection or loss of bowel or bladder control without the urge to defecate or urinate.     If you have a clinical question that cannot wait until your next appointment, please call [phone] between 8am-4pm Monday - Friday or send a [employer] message. We do our best to return all non-emergency messages within 24 hours, Monday - Friday. A nurse or physician will return your message. You may also try calling and they will do their best to answer your question(s):  - Dr. Chuy Urrutia's nurse ([phone])  - Dr. Elliot Rutherford/Dr. Nelson's nurse ([phone])  - Dr. Isaiah Peralta/Manas's nurse ([phone])     If you need to cancel an appointment, please call the scheduling staff at [phone] during normal business hours or leave a message at least 24 hours in advance.     If you are going to be sedated for your next procedure, you MUST have responsible adult who can legally drive accompany you home. You cannot eat or drink for at least eight hours prior to the planned procedure if you are going to receive sedation. You may take your non-blood thinning medications with a small sip of water.

## 2025-06-13 NOTE — ANESTHESIA PROCEDURE NOTES
Airway  Date/Time: 6/13/2025 8:29 AM  Reason: elective    Airway not difficult    Staffing  Performed: CINDY   Authorized by: Mao Cameron MD    Performed by: CINDY Potter  Patient location during procedure: OR    Patient Condition  Indications for airway management: anesthesia  Patient position: sniffing  MILS maintained throughout  Sedation level: deep     Final Airway Details  Preoxygenated: no  Final airway type: endotracheal airway  Successful airway: ETT  Cuffed: yes   Successful intubation technique: direct laryngoscopy  Adjuncts used in placement: intubating stylet  Endotracheal tube insertion site: oral  Blade: Abdiel  Blade size: #3  ETT size (mm): 7.0  Cormack-Lehane Classification: grade I - full view of glottis  Placement verified by: chest auscultation and capnometry   Measured from: gums  ETT to gums (cm): 22  Number of attempts at approach: 1    Additional Comments  Atraumatic intubation, dentition in tact

## 2025-06-13 NOTE — ANESTHESIA POSTPROCEDURE EVALUATION
Patient: Lia Jeong    Procedure Summary       Date: 06/13/25 Room / Location: [facility] / [facility]    Anesthesia Start: 0819 Anesthesia Stop: 1019    Procedure: VERTEBROPLASTY, SPINE, LUMBAR Diagnosis:       Compression fracture of L3 vertebra, sequela      (Compression fracture of L3 vertebra, sequela [S32.030S])    Surgeons: Ghanshyam Colon MD PhD Responsible Provider: Mao Cameron MD    Anesthesia Type: general ASA Status: 3            Anesthesia Type: general    Vitals Value Taken Time   /78 06/13/25 11:05   Temp 36.1 °C (97 °F) 06/13/25 11:05   Pulse 65 06/13/25 11:05   Resp 16 06/13/25 11:05   SpO2 95 % 06/13/25 11:05       Anesthesia Post Evaluation    Patient location during evaluation: PACU  Patient participation: complete - patient participated  Level of consciousness: awake  Pain score: 3  Pain management: adequate  Multimodal analgesia pain management approach  Airway patency: patent  Two or more strategies used to mitigate risk of obstructive sleep apnea  Cardiovascular status: acceptable  Respiratory status: acceptable  Hydration status: acceptable  Postoperative Nausea and Vomiting: none        There were no known notable events for this encounter.

## 2025-06-13 NOTE — OP NOTE
VERTEBROPLASTY, SPINE, LUMBAR Operative Note     Date: 2025  OR Location: [facility]    Name: Lia Jeong, : 1950, Age: 75 y.o., MRN: [id number], Sex: female    Diagnosis  Pre-op Diagnosis      * Compression fracture of L3 vertebra, sequela [S32.030S] Post-op Diagnosis     * Compression fracture of L3 vertebra, sequela [S32.030S]     Procedures  VERTEBROPLASTY, SPINE, LUMBAR  [id number] [facility] KYPHOPLASTY-LUMBAR      Surgeons      * Ghanshyam Colon - Primary    Resident/Fellow/Other Assistant:  Jesus Cross MD Fellow      Staff:   Circulator: Tamera  Scrub Person: Rosanna    Anesthesia Staff: Anesthesiologist: Mao Cameron MD  C-AA: CINDY Potter    Procedure Summary  Anesthesia: General  ASA: III  Estimated Blood Loss: < 5ml  Intra-op Medications:   Administrations occurring from 0745 to 0940 on 25:   Medication Name Total Dose   lidocaine (Xylocaine) 5 mg/mL (0.5 %) injection 3 mL   iohexol (OMNIPaque) 240 mg iodine/mL solution 30 mL   clindamycin (Cleocin) 900 mg in dextrose 5% IV 50 mL 900 mg   dexAMETHasone (Decadron) 4 mg/mL IV Syringe 2 mL 4 mg   fentaNYL (Sublimaze) injection 50 mcg/mL 75 mcg   LR bolus Cannot be calculated   lidocaine PF (Xylocaine-MPF) local injection 1 % 5 mL   midazolam (Versed) injection 1 mg/mL 1 mg   propofol (Diprivan) injection 10 mg/mL 150 mg   rocuronium (ZeMuron) 50 mg/5 mL injection 50 mg              Anesthesia Record               Intraprocedure I/O Totals          Intake    clindamycin (Cleocin) 900 mg in dextrose 5% IV 50 mL 50.00 mL    Total Intake 50 mL          Specimen: No specimens collected              Drains and/or Catheters: * None in log *    Tourniquet Times:         Implants:  Implants       Type Name Action Serial No.      Implant BONE CEMENT, KYPHON, MIXER PACK - [id number] Implanted       KYPHOPAK EXPRESS TRAY 15MM/2, FIRST FRACTURE, W/ KYPHX EXPRESS OSTEO INTRODUCER SYSTEM AND KYPHON CEMENT DELIVERY SYSTEM Implanted LUMB               Findings: L3 vertebral compression fracture    Indications: Lia Jeong is an 75 y.o. female who is having surgery for Compression fracture of L3 vertebra, sequela [S32.030S].     The patient was seen in the preoperative area. The risks, benefits, complications, treatment options, non-operative alternatives, expected recovery and outcomes were discussed with the patient. The possibilities of reaction to medication, pulmonary aspiration, injury to surrounding structures, bleeding, recurrent infection, the need for additional procedures, failure to diagnose a condition, and creating a complication requiring transfusion or operation were discussed with the patient. The patient concurred with the proposed plan, giving informed consent.  The site of surgery was properly noted/marked if necessary per policy. The patient has been actively warmed in preoperative area. Preoperative antibiotics have been ordered and given within 1 hours of incision. Venous thrombosis prophylaxis have been ordered including bilateral sequential compression devices    INDICATION FOR PROCEDURE:       Persistent back pain at site of vertebral body fracture, intractable, failed conservative medical management including: time, rest, activity modification, oral analgesics. Kyphoplasty indicated to target pain generator as seen on imaging and minimize risk/likelihood of chronic opioid use and/or surgery         INFORMED CONSENT:    After reviewing the procedure with the patient, expected outcomes, potential benefits and procedural risks, informed consent to proceed with the injection was obtained.  The potential benefit of significantly decreased back pain was reviewed; however, the possibility of little to no pain relief resulting from the procedure was also discussed.  The patient was specifically made aware of risks involving spinal cord injury, nerve root injury, pedicle fracture, hematoma, pain at injection site, cement leakage, and  "vascular injury.     DESCRIPTION OF PROCEDURE:     Pt brought to the OR, placed in prone position, standard ASA monitors applied, GA by anesthesia team    Patient's lumbar spine prepped with chloroprep and duraprep and draped in sterile fashion. The L3 vertebra was identified under fluoroscopy and the end plates squared off. Both pedicles identified. Skin anesthetized with 0.5% lidocaine. A 3.5\" 22 G spinal needle was advanced under fluoro guidance to contact pedicle. After negative aspiration, additional o.5% lidocaine was administered. Needle re-styletted and withdrawn. Then, skin puncture was made with a #11 blade.     An 11 G Jamshidi trocar with alexey tip stylet was placed percutaenously and seated into the superolateral aspect of the right pedicle, as we were not able to access the left pedicle due to anatomical reasons.  Using a small mallet, the trocar was advanced slowly with AP and lateral fluoroscopic guidance to ensure intra-pedicular access without violation of foramen or canal. Thus, the trochar was advanced to a terminal position in the anterior middle  portion of the L3 vertebral body.  Stylet removed and balloon inserted. Balloon inflated not to exceed 300 mm Hg to create cavity. Balloon then manually deflated and removed intact.   After cement was prepared using Striker kit, approximately 3 cc of cement was injected under constant live fluoroscopy showing excellent cephalo-caudad spread and interdigitation within the vertebral body. Bilateral spread was confirmed on AP views. No canal or foraminal spread of cement seen. The stylet was then replaced and the trocar was carefully removed. Steri-strips were applied to the puncture site.     The pt was transferred to the recovery room in stable condition and left supine for 60 min until the cement was cured. At no time the patient experience lower extremity pain or paresthesia.  The patient was neurologically intact post-op and the BLE were assessed " in the recovery room.      The patient was discharged home  in excellent condition with a family member.         Evidence of Infection: No   Complications:  None; patient tolerated the procedure well.    Disposition: PACU - hemodynamically stable.  Condition: stable         Attending Attestation: I was present and scrubbed for the entire procedure.    Ghanshyam Colon  Phone Number: [phone]

## 2025-06-13 NOTE — ANESTHESIA PREPROCEDURE EVALUATION
Patient: Lia Jeong    Procedure Information       Date/Time: 06/13/25 0745    Procedure: VERTEBROPLASTY, SPINE, LUMBAR  (  2 C-Arms needed )    Location: [facility] / [facility]    Surgeons: Ghanshyam Colon MD PhD            Relevant Problems   Cardiac   (+) Chest pain   (+) Essential hypertension   (+) Mixed hyperlipidemia      Pulmonary   (+) Asthma   (+) Chronic obstructive pulmonary disease (Multi)   (+) Mild persistent asthma without complication (HHS-HCC)   (+) Pulmonary embolism   (+) SOB (shortness of breath) on exertion      Neuro   (+) Carpal tunnel syndrome of right wrist   (+) Diabetic peripheral neuropathy (Multi)   (+) Small fiber neuropathy      Endocrine   (+) Diabetic peripheral neuropathy (Multi)   (+) Mild nonproliferative diabetic retinopathy of both eyes without macular edema associated with type 2 diabetes mellitus   (+) Moderate nonproliferative diabetic retinopathy of both eyes without macular edema associated with type 2 diabetes mellitus   (+) Type 2 diabetes with stage 2 chronic kidney disease GFR 60-89 (Multi)      Hematology   (+) Anticardiolipin antibody syndrome (Multi)      Musculoskeletal   (+) Carpal tunnel syndrome of right wrist      HEENT   (+) Asymmetric SNHL (sensorineural hearing loss)   (+) Chronic pansinusitis   (+) Primary open angle glaucoma (POAG) of both eyes, mild stage       Clinical information reviewed:   Tobacco  Allergies  Meds   Med Hx  Surg Hx  OB Status  Fam Hx  Soc   Hx      Vitals:    06/13/25 0650   BP: 145/78   Pulse: 71   Resp: 18   Temp: 36.4 °C (97.5 °F)   SpO2: 96%       Surgical History[1]  Medical History[2]  Current Medications[3]  Prior to Admission medications    Medication Sig Start Date End Date Taking? Authorizing Provider   albuterol 90 mcg/actuation inhaler inhale two puffs by mouth every 6 hours as needed 1/3/25  Yes Historical Provider, MD   atorvastatin (Lipitor) 80 mg tablet Take 1 tablet (80 mg) by mouth once daily at  bedtime. 7/31/14  Yes Historical Provider, MD   bimatoprost (Lumigan) 0.01 % ophthalmic solution Administer 1 drop into both eyes once daily at bedtime. 5/6/25  Yes Beryl Boothe MD   brimonidine (AlphaGAN) 0.2 % ophthalmic solution Administer 1 drop into both eyes 2 times a day.   Yes Historical Provider, MD   clonazePAM (KlonoPIN) 1 mg tablet Take 1 tablet (1 mg) by mouth every 8 hours if needed. 1/28/25 7/27/25 Yes Historical Provider, MD   cloNIDine (Catapres) 0.1 mg tablet Take 1 tablet (0.1 mg) by mouth twice a day. 11/14/14  Yes Historical Provider, MD   dilTIAZem LA (Cardizem LA) 180 mg 24 hr tablet Take 1 tablet (180 mg) by mouth once daily.   Yes Historical Provider, MD   dorzolamide (Trusopt) 2 % ophthalmic solution Administer 1 drop into both eyes once daily.   Yes Historical Provider, MD   fluticasone (Flonase) 50 mcg/actuation nasal spray Administer 1 spray into each nostril once daily as needed. 5/8/14  Yes Historical Provider, MD   gabapentin (Neurontin) 600 mg tablet Take 2 tablets (1,200 mg) by mouth 3 times a day. 12/29/17 6/13/25 Yes Historical Provider, MD   HumaLOG U-100 Insulin 100 unit/mL injection Inject 15 Units under the skin 3 times a day before meals. 15 units with meal 8/20/24  Yes Historical Provider, MD   hydrALAZINE (Apresoline) 50 mg tablet Take 0.5 tablets (25 mg) by mouth 3 times a day. *Please note change in dose  Patient taking differently: Take 1 tablet (50 mg) by mouth 3 times a day. *Please note change in dose 10/5/23 6/13/25 Yes MINDY Verdin-CNP   insulin glargine (Lantus U-100 Insulin) 100 unit/mL injection Inject 50 Units under the skin once every 24 hours. Take as directed per insulin instructions.   Yes Historical Provider, MD   metoprolol tartrate (Lopressor) 100 mg tablet Take 0.5 tablets (50 mg) by mouth 2 times a day. 5/19/22  Yes Historical Provider, MD   montelukast (Singulair) 10 mg tablet Take 1 tablet (10 mg) by mouth once daily at bedtime. 8/11/14   Yes Historical Provider, MD   potassium chloride CR 20 mEq ER tablet TAKE TWO TABLETS (40 MEQ) BY MOUTH DAILY WITH FOOD 10/6/24  Yes Historical Provider, MD   torsemide (Demadex) 20 mg tablet Take 1 tablet (20 mg) by mouth once daily.  Patient taking differently: Take 2 tablets (40 mg) by mouth once daily. 10/5/23 6/13/25 Yes Mima Silva, APRN-[facility]   Trelegy Ellipta 200-62.5-25 mcg blister with device INHALE ONE PUFF BY MOUTH EVERY MORNING 7/1/24  Yes Historical Provider, MD   b complex 0.4 mg tablet Take 1 tablet by mouth once daily.    Historical Provider, MD   cholecalciferol (Vitamin D-3) 25 mcg (1,000 units) capsule Take 1 capsule (25 mcg) by mouth once daily.    Historical Provider, MD   dapagliflozin propanediol (Farxiga) 10 mg tablet Take 1 tablet (10 mg) by mouth once daily with breakfast. 4/4/23   Historical Provider, MD   ezetimibe (Zetia) 10 mg tablet Take 1 tablet (10 mg) by mouth once daily.    Historical Provider, MD   omega-3 acid ethyl esters (Lovaza) 1 gram capsule Take 1 capsule (1 g) by mouth once daily.    Historical Provider, MD   primidone (Mysoline) 50 mg tablet Take 2 tablets (100 mg) by mouth 2 times a day.  Patient not taking: No sig reported 12/18/23 4/16/24  Johnathan Buenrostro MD   semaglutide (OZEMPIC) 1 mg/dose (4 mg/3 mL) pen injector 1 mg every 7 days.    Historical Provider, MD   warfarin (Coumadin) 5 mg tablet Take 1 tablet (5 mg) by mouth. Take as directed per After Visit Summary. Pt takes 1.5 tablet Tuesday, Thursday, Saturday and Sunday. Monday Wednesday and Friday 1 tablet    Historical Provider, MD     RX Allergies[4]  Social History     Tobacco Use    Smoking status: Never     Passive exposure: Never    Smokeless tobacco: Never    Tobacco comments:     Pt was hospitalized from 9/30/23 to Oct 5, 2023 with exacerbation of asthma, acute episode of chronic diastolic heart failure and acute bronchitits.  Pt denies any further SOB and feels she can lay flat for 30 minutes for  her procedure.   Substance Use Topics    Alcohol use: Never         Chemistry    Lab Results   Component Value Date/Time     03/18/2025 0926    K 5.0 03/18/2025 0926     03/18/2025 0926    CO2 28 03/18/2025 0926    BUN 19 03/18/2025 0926    CREATININE 0.85 03/18/2025 0926    Lab Results   Component Value Date/Time    CALCIUM 9.5 03/18/2025 0926    ALKPHOS 118 03/18/2025 0926    AST 24 03/18/2025 0926    ALT 24 03/18/2025 0926    BILITOT 0.4 03/18/2025 0926          Lab Results   Component Value Date/Time    WBC 7.4 03/18/2025 0926    HGB 13.4 03/18/2025 0926    HCT 42.0 03/18/2025 0926     03/18/2025 0926     Lab Results   Component Value Date/Time    PROTIME 22.9 (H) 03/18/2025 0926    PROTIME 17.6 (H) 10/05/2023 0652    INR 2.4 (H) 03/18/2025 0926    INR 1.6 (H) 10/05/2023 0652     Encounter Date: 03/18/25   ECG 12 Lead   Result Value    Ventricular Rate 77    Atrial Rate 77    MA Interval 196    QRS Duration 72    QT Interval 396    QTC Calculation(Bazett) 448    P Axis 44    R Axis 6    T Axis 50    QRS Count 12    Q Onset 223    P Onset 125    P Offset 181    T Offset 421    QTC Fredericia 430    Narrative    Normal sinus rhythm  Minimal voltage criteria for LVH, may be normal variant  Nonspecific T wave abnormality  Abnormal ECG  When compared with ECG of 30-SEP-2023 11:31,  Previous ECG has undetermined rhythm, needs review  Confirmed by Anthony Randolph ([id number]) on 3/21/2025 12:57:23 PM       NPO Detail:  NPO/Void Status  Date of Last Liquid: 06/12/25  Time of Last Liquid: 2200  Date of Last Solid: 06/11/25  Time of Last Solid: 2000  Last Intake Type: Clear fluids  Time of Last Void: 0635         Physical Exam    Airway  Mallampati: II  TM distance: >3 FB  Neck ROM: full  Mouth opening: 3 or more finger widths     Cardiovascular    Dental    Pulmonary    Abdominal            Anesthesia Plan    History of general anesthesia?: yes  History of complications of general anesthesia?: no    ASA 3      general     The patient is not a current smoker.  Patient was not previously instructed to abstain from smoking on day of procedure.  Patient did not smoke on day of procedure.  Education provided regarding risk of obstructive sleep apnea.  intravenous induction   Anesthetic plan and risks discussed with patient.    Plan discussed with CRNA and CAA.           [1]   Past Surgical History:  Procedure Laterality Date    CATARACT EXTRACTION W/  INTRAOCULAR LENS IMPLANT Right 10/19/2023    Dr. Carson    CATARACT EXTRACTION W/  INTRAOCULAR LENS IMPLANT Left 09/28/2023    Dr. Carson    COLONOSCOPY      EYE SURGERY  03/19/2015    Eye Surgery    EYE SURGERY Right 05/20/2025    INTRAOCULAR LENS INSERTION      MR HEAD ANGIO WO IV CONTRAST  05/18/2022    MR HEAD ANGIO WO IV CONTRAST 5/18/2022 [facility]    MR NECK ANGIO WO IV CONTRAST  05/18/2022    MR NECK ANGIO WO IV CONTRAST 5/18/2022 [facility]    TONSILLECTOMY  09/28/2017    Tonsillectomy    TUBAL LIGATION  09/28/2017    Tubal Ligation   [2]   Past Medical History:  Diagnosis Date    Arthritis     Asthma     Cataract     CHF (congestive heart failure)     Clotting disorder (Multi)     Colon polyp     COPD (chronic obstructive pulmonary disease) (Multi)     Diabetes mellitus (Multi)     Diabetic retinopathy (Multi)     Dry eyes     Easy bruising     Fractures     GERD (gastroesophageal reflux disease)     Gestational diabetes     Glaucoma     Hyperlipidemia     Hypertension     Obesity     Peripheral neuropathy     Pulmonary embolus     Shortness of breath     Sleep apnea     Type 2 diabetes mellitus    [3] No current facility-administered medications for this encounter.  [4]   Allergies  Allergen Reactions    Amoxicillin Diarrhea    Amoxicillin-Pot Clavulanate Diarrhea     SEVERE DIARRHEA    Hydrochlorothiazide Other     DECREASE SEXUAL LIBIDO    Low libido    Lisinopril Cough     COUGH    Losartan Cough

## 2025-06-13 NOTE — H&P
Pain Management H&P    History Of Present Illness  Lia Jeong is a 75 y.o. female presents for procedure state below. Endorses no changes in past medical history or medical health since last seen in clinic.      Past Medical History  She has a past medical history of Arthritis, Asthma, Cataract, CHF (congestive heart failure), Clotting disorder (Multi), Colon polyp, COPD (chronic obstructive pulmonary disease) (Multi), Diabetes mellitus (Multi), Diabetic retinopathy (Multi), Dry eyes, Easy bruising, Fractures, GERD (gastroesophageal reflux disease), Gestational diabetes, Glaucoma, Hyperlipidemia, Hypertension, Obesity, Peripheral neuropathy, Pulmonary embolus, Shortness of breath, Sleep apnea, and Type 2 diabetes mellitus.    Surgical History  She has a past surgical history that includes Eye surgery (03/19/2015); Tonsillectomy (09/28/2017); Tubal ligation (09/28/2017); MR angio head wo IV contrast (05/18/2022); MR angio neck wo IV contrast (05/18/2022); Cataract extraction w/  intraocular lens implant (Right, 10/19/2023); Colonoscopy; Intraocular lens insertion; Cataract extraction w/  intraocular lens implant (Left, 09/28/2023); and Eye surgery (Right, 05/20/2025).     Social History  She reports that she has never smoked. She has never been exposed to tobacco smoke. She has never used smokeless tobacco. She reports that she does not drink alcohol and does not use drugs.    Family History  Family History[1]     Allergies  Amoxicillin, Amoxicillin-pot clavulanate, Hydrochlorothiazide, Lisinopril, and Losartan    Review of Symptoms:   Constitutional: Negative for chills, diaphoresis or fever  HENT: Negative for neck swelling  Eyes:.  Negative for eye pain  Respiratory:.  Negative for cough, shortness of breath or wheezing    Cardiovascular:.  Negative for chest pain or palpitations  Gastrointestinal:.  Negative for abdominal pain, nausea and vomiting  Genitourinary:.  Negative for urgency  Musculoskeletal:   Positive for back pain. Positive for joint pain. Denies falls within the past 3 months.  Skin: Negative for wounds or itching   Neurological: Negative for dizziness, seizures, loss of consciousness and weakness  Endo/Heme/Allergies: Does not bruise/bleed easily  Psychiatric/Behavioral: Negative for depression. The patient does not appear anxious.      Pre-sedation Evaluation  ASA class 3  Mallampati score 2     PHYSICAL EXAM  Vitals signs reviewed  Constitutional:       General: Not in acute distress     Appearance: Normal appearance. Not ill-appearing.  HENT:     Head: Normocephalic and atraumatic  Eyes:     Conjunctiva/sclera: Conjunctivae normal  Cardiovascular:     Rate and Rhythm: Normal rate and regular rhythm  Pulmonary:     Effort: No respiratory distress  Abdominal:     Palpations: Abdomen is soft  Musculoskeletal: MANE  Skin:     General: Skin is warm and dry  Neurological:     General: No focal deficit present  Psychiatric:         Mood and Affect: Mood normal         Behavior: Behavior normal     Last Recorded Vitals  There were no vitals taken for this visit.    Relevant Results  Current Outpatient Medications   Medication Instructions    albuterol 90 mcg/actuation inhaler inhale two puffs by mouth every 6 hours as needed    atorvastatin (LIPITOR) 80 mg, Nightly    b complex 0.4 mg tablet 1 tablet, Daily    bimatoprost (Lumigan) 0.01 % ophthalmic solution 1 drop, Both Eyes, Nightly    brimonidine (AlphaGAN) 0.2 % ophthalmic solution 1 drop, 2 times daily    cholecalciferol (VITAMIN D-3) 25 mcg, Daily    clonazePAM (KLONOPIN) 1 mg, Every 8 hours PRN    cloNIDine (CATAPRES) 0.1 mg, 2 times daily    dapagliflozin propanediol (FARXIGA) 10 mg, Daily with breakfast    dilTIAZem LA (Cardizem LA) 180 mg 24 hr tablet 1 tablet, Daily    dorzolamide (Trusopt) 2 % ophthalmic solution 1 drop, Daily    ezetimibe (ZETIA) 10 mg, Daily    fluticasone (Flonase) 50 mcg/actuation nasal spray 1 spray, Daily PRN    gabapentin  (NEURONTIN) 1,200 mg, 3 times daily    HumaLOG U-100 Insulin 100 unit/mL injection Inject 15 Units under the skin 3 times a day before meals. 15 units with meal    hydrALAZINE (APRESOLINE) 25 mg, oral, 3 times daily, *Please note change in dose    Lantus U-100 Insulin 50 Units, Every 24 hours    metoprolol tartrate (LOPRESSOR) 50 mg, 2 times daily    montelukast (SINGULAIR) 10 mg, Nightly    omega-3 acid ethyl esters (LOVAZA) 1 g, Daily    potassium chloride CR 20 mEq ER tablet TAKE TWO TABLETS (40 MEQ) BY MOUTH DAILY WITH FOOD    primidone (MYSOLINE) 100 mg, oral, 2 times daily    semaglutide (OZEMPIC) 1 mg/dose (4 mg/3 mL) pen injector 1 mg every 7 days.    torsemide (DEMADEX) 20 mg, oral, Daily    Trelegy Ellipta 200-62.5-25 mcg blister with device INHALE ONE PUFF BY MOUTH EVERY MORNING    warfarin (COUMADIN) 5 mg         MR lumbar spine wo IV contrast 02/05/2025    Narrative  Interpreted By:  Judit Mitchell,  STUDY:  MRI of the lumbar spine without IV contrast;  2/5/2025 9:07 am    INDICATION:  Signs/Symptoms:low back pain.    ,S32.010S Wedge compression fracture of first lumbar vertebra,  sequela,S32.030S Wedge compression fracture of third lumbar vertebra,  sequela,M54.16 Radiculopathy, lumbar region    COMPARISON:  CT abdomen 11/30/2024 and MRI 05/19/2022    ACCESSION NUMBER(S):  [id number]    ORDERING CLINICIAN:  WAGNER [facility]LE[facility]    TECHNIQUE:  Sagittal and axial STIR and T1-weighted MRI images of the lumbar  spine were acquired using a spondylolysis protocol.  No contrast was  administered.    FINDINGS:  For counting purposes the last fully segmented vertebral body is  labeled L5. There is partial lumbarization of the S1 vertebral body.  The last fully formed disc space on axial image 16 series 9 we will  be labeled L5-S1.    There is trace retrolisthesis of L1 on L2 and trace anterolisthesis  of L5 on S1. Alignment is otherwise maintained.    There is a nonacute prominent Schmorl's node along the  superior  endplate of L1, similar to the prior exam.    There is a superior endplate fracture/compression deformity at L3 new  from the prior MRI 05/19/2022 and slightly increased compared to the  prior CTA abdomen 11/30/2024 given differences in technique. There is  a proximally 25% loss of vertebral body height and trace osseous  retropulsion of the superior posterior cortex. There is increased  STIR signal along the superior endplate and anterior L3 vertebral  body extending into the posterior element on the left. Vertebral body  heights are otherwise maintained.    There are numerous T1 and T2 hypointense foci within the T11, T12, L3  and L5 vertebral bodies corresponding to sclerotic foci on the prior  CT and may represent bone islands.    There is desiccated disc signal throughout the lower thoracic and  lumbar spine. No significant loss of disc height.    The conus terminates at L1-L2. Prevertebral soft tissues are not  thickened.    Evaluation by level:    T11-T12: Disc bulge, facet arthrosis and ligamentum flavum  thickening. Mild spinal canal and neural foraminal stenosis.    T12-L1: Disc bulge and facet arthrosis. No spinal canal stenosis.  Mild neural foraminal stenosis.    L1-L2: Disc bulge with a small central disc protrusion, bilateral  facet arthrosis and ligamentum flavum thickening. No spinal canal  stenosis. Mild neural foraminal stenosis.    L2-L3: Disc bulge, facet arthrosis and ligamentum flavum thickening.  Moderate spinal canal stenosis. Mild-to-moderate bilateral neural  foraminal stenosis.    L3-L4: Disc bulge and facet arthrosis. Mild spinal canal and neural  foraminal stenosis.    L4-L5: Disc bulge, facet arthrosis and ligamentum flavum thickening.  Mild spinal canal stenosis. Mild neural foraminal stenosis.    L5-S1: Mild disc uncovering, disc bulge and facet arthrosis. No  spinal canal stenosis. No neural foraminal stenosis.    Impression  There is an acute to subacute superior endplate  fracture and  compression deformity at L3 with 25% loss of vertebral body height  and minimal osseous retropulsion of the posterosuperior cortex.    Degenerative changes most pronounced at L2-L3 with moderate spinal  canal and mild-to-moderate bilateral neural foraminal stenosis.    I personally reviewed the images/study and I agree with the findings  as stated. This study was interpreted at [facility], [place], [place].    MACRO:  None    Signed by: Judit Mitchell 2/5/2025 11:21 AM  Dictation workstation:   [id number]      XR hip right with pelvis when performed 2 or 3 views 09/10/2024    Narrative  Interpreted By:  Rhina Ortiz,  STUDY:  Single view pelvis.  Right hip, two views.    INDICATION:  Signs/Symptoms:Right thigh is numb.    COMPARISON:  None.    ACCESSION NUMBER(S):  [id number]    ORDERING CLINICIAN:  CHARLIE BIGGS    FINDINGS:  No acute fracture or malalignment.  Bilateral hip joint spaces are well preserved.  Mild bilateral hip osteoarthrosis with acetabular osteophytes.  Soft tissues are within normal limits.  Lower lumbar facet joint arthropathy noted.  Calcified structure in the pelvis likely representing fibroid.  Femoral artery vascular calcifications.    Impression  1. Mild bilateral hip osteoarthrosis with osteophytosis.  2. Additional findings as above    MACRO:  None.    Signed by: Rhina Ortiz 9/11/2024 6:25 PM  Dictation workstation:   [id number]     No image results found.     No diagnosis found.     ASSESSMENT/PLAN  Lia Jeong is a 75 y.o. female here for L3 kyphoplasty under fluoroscopy    Patient denies any recent antibiotic use or infections, held warfarin for six days, and denies contrast or local anesthetic allergies     Risks, benefits, alternatives discussed. All questions answered to the best of my ability. Patient agrees to proceed.      Our plan is as follows:  - Proceed with aforementioned procedure          Jesus  MD [place]   Pain fellow          [1]   Family History  Problem Relation Name Age of Onset    Hypertension Mother Betty Gotti     Hyperlipidemia Mother Betty Gotti     Diabetes Mother Betty Gotti     Breast cancer Sister

## 2025-06-13 NOTE — PERIOPERATIVE NURSING NOTE
1015: Patient arrived in PACU drowsy and resting comfortably. Patient able to respond to verbal commands.   1016: Dr. Colon at bedside checking on patient.   1027: O2 discontinued-Patient tolerating room air.  1028: Blood sugar 136.  1034: Dr. Colon at bedside speaking to patient- Dr. Colon reports to PACU nurse to keep patient in PACU for 1 hour and not to move out of bed.    1034: Patient awake and alert.  1043: Oral intake provided-Patient tolerating.  1119: Patient remained stable in PACU to be discharged to Phase 2. Upon discharge from PACU patient reported pain a 3/10 that was tolerable and patient declined medication. Patient denies nausea and no vomiting. Patient offers no complaints and all patients questions answered. Report given to PHASE 2 nurse.

## 2025-06-15 PROBLEM — Z98.41 HISTORY OF YAG LASER CAPSULOTOMY OF LENS OF RIGHT EYE: Status: ACTIVE | Noted: 2025-06-15

## 2025-06-15 ASSESSMENT — CUP TO DISC RATIO
OD_RATIO: 0.8
OS_RATIO: 0.8

## 2025-06-15 ASSESSMENT — SLIT LAMP EXAM - LIDS
COMMENTS: GOOD POSITION
COMMENTS: GOOD POSITION

## 2025-06-15 ASSESSMENT — EXTERNAL EXAM - LEFT EYE: OS_EXAM: NORMAL

## 2025-06-15 ASSESSMENT — EXTERNAL EXAM - RIGHT EYE: OD_EXAM: NORMAL

## 2025-06-15 NOTE — PROGRESS NOTES
History of YAG laser capsulotomy, right eye  Posterior capsular opacification, right eye  Posterior capsular opacification, left eye  Pseudophakia of right eye - 10/19/23  Pseudophakia of left eye - 9/28/23  -S/p YAG laser capsulotomy OD 5/20/25 - vision improved slightly, but small opening. Would consider repeat YAG laser capsulotomy OD if vision is still blurry for patient following YAG laser capsulotomy OS.   -Visually significant PCO left eye. Symptoms: Gradual decrease in vision x 6 months. Harder to read small print. More difficulty seeing small words/numbers on TV. Having more trouble seeing road signs when driving. Glare from headlights when driving at night. Discussed diagnosis with patient and option of YAG capsulotomy. Discussed procedure. Discussed potential risks, benefits, and alternatives, including but not limited to: pain, inflammation, edema, retinal tear/detachment, floaters, increased eye pressure, damage to other ocular structures, damage to/dislocation of lens implant, glare, need to repeat procedure, loss of vision or loss of eye. Patient understands and wishes to proceed. Consent signed.  YAG capsulotomy left eye done 6/17/25. Advised to use artificial tears PRN. F/u 4-6 weeks - refract OU, glare/BAT OS, dilate OU for Planned YAG laser capsulotomy OS. D/w patient may need to repeat laser to enlarge opening in the future as needed. Call or return sooner if any redness, pain, decreased vision, flashes, or floaters.   Pulse = 90  TE = 72  Power = 0.8 mJ  **Referred by Dr. Boothe    Primary open angle glaucoma, both eyes, mild stage  -Continue bimatoprost OU QHS and dorzolamide OU BID  -Followed by Dr. Emery    Dry eyes, bilateral  Choroidal nevus, both eyes  Moderate nonproliferative diabetic retinopathy both eyes, without macular edema  -Monitored by [facility]    Hyperopia  Astigmatism  Presbyopia  -Refraction performed today for diagnostic purposes only and without specific intent to  dispense Rx. Glasses Rx not dispensed today.   -Defer new Rx. No significant improvement in vision with refraction at this time.

## 2025-06-17 ENCOUNTER — APPOINTMENT (OUTPATIENT)
Dept: OPHTHALMOLOGY | Facility: CLINIC | Age: 75
End: 2025-06-17
Payer: MEDICARE

## 2025-06-17 DIAGNOSIS — H04.123 DRY EYES: ICD-10-CM

## 2025-06-17 DIAGNOSIS — H26.492 PCO (POSTERIOR CAPSULAR OPACIFICATION), LEFT: ICD-10-CM

## 2025-06-17 DIAGNOSIS — D31.32 CHOROIDAL NEVUS, LEFT EYE: ICD-10-CM

## 2025-06-17 DIAGNOSIS — H40.1131 PRIMARY OPEN ANGLE GLAUCOMA (POAG) OF BOTH EYES, MILD STAGE: ICD-10-CM

## 2025-06-17 DIAGNOSIS — H52.03 HYPEROPIA OF BOTH EYES: ICD-10-CM

## 2025-06-17 DIAGNOSIS — Z96.1 PSEUDOPHAKIA: ICD-10-CM

## 2025-06-17 DIAGNOSIS — Z98.41 HISTORY OF YAG LASER CAPSULOTOMY OF LENS OF RIGHT EYE: Primary | ICD-10-CM

## 2025-06-17 DIAGNOSIS — H52.223 REGULAR ASTIGMATISM OF BOTH EYES: ICD-10-CM

## 2025-06-17 DIAGNOSIS — D31.31 NEVUS OF CHOROID OF RIGHT EYE: ICD-10-CM

## 2025-06-17 DIAGNOSIS — E11.3393 MODERATE NONPROLIFERATIVE DIABETIC RETINOPATHY OF BOTH EYES WITHOUT MACULAR EDEMA ASSOCIATED WITH TYPE 2 DIABETES MELLITUS: ICD-10-CM

## 2025-06-17 DIAGNOSIS — H26.491 PCO (POSTERIOR CAPSULAR OPACIFICATION), RIGHT: ICD-10-CM

## 2025-06-17 DIAGNOSIS — H52.4 PRESBYOPIA: ICD-10-CM

## 2025-06-17 PROCEDURE — 99214 OFFICE O/P EST MOD 30 MIN: CPT | Performed by: OPHTHALMOLOGY

## 2025-06-17 PROCEDURE — 66821 AFTER CATARACT LASER SURGERY: CPT | Mod: LEFT SIDE | Performed by: OPHTHALMOLOGY

## 2025-06-17 ASSESSMENT — PACHYMETRY
OD_CT(UM): 563
OS_CT(UM): 542

## 2025-06-17 ASSESSMENT — ENCOUNTER SYMPTOMS
CONSTITUTIONAL NEGATIVE: 0
GASTROINTESTINAL NEGATIVE: 0
EYES NEGATIVE: 1
CARDIOVASCULAR NEGATIVE: 0
PSYCHIATRIC NEGATIVE: 0
RESPIRATORY NEGATIVE: 0
MUSCULOSKELETAL NEGATIVE: 0
ALLERGIC/IMMUNOLOGIC NEGATIVE: 0
NEUROLOGICAL NEGATIVE: 0
ENDOCRINE NEGATIVE: 0
HEMATOLOGIC/LYMPHATIC NEGATIVE: 0

## 2025-06-17 ASSESSMENT — REFRACTION_WEARINGRX
OS_CYLINDER: -0.25
OD_CYLINDER: -1.00
OD_ADD: +2.50
OD_AXIS: 080
OS_SPHERE: +0.75
OS_AXIS: 075
OS_ADD: +2.50
OD_SPHERE: +1.25

## 2025-06-17 ASSESSMENT — TONOMETRY
IOP_METHOD: GOLDMANN APPLANATION
OD_IOP_MMHG: 16
OS_IOP_MMHG: 16

## 2025-06-17 ASSESSMENT — REFRACTION_MANIFEST
OD_ADD: +2.50
OS_AXIS: 075
OS_CYLINDER: -0.25
OS_ADD: +2.50
OD_AXIS: 080
OS_SPHERE: +0.75
OD_SPHERE: +1.25
OD_CYLINDER: -1.00

## 2025-06-17 ASSESSMENT — VISUAL ACUITY
OD_CC: 20/25
CORRECTION_TYPE: GLASSES
OS_BAT_MED: 20/70
METHOD: SNELLEN - LINEAR
OS_CC: 20/40

## 2025-06-25 ENCOUNTER — OFFICE VISIT (OUTPATIENT)
Dept: PAIN MEDICINE | Facility: HOSPITAL | Age: 75
End: 2025-06-25
Payer: MEDICARE

## 2025-06-25 DIAGNOSIS — S32.030S COMPRESSION FRACTURE OF L3 VERTEBRA, SEQUELA: Primary | ICD-10-CM

## 2025-06-25 DIAGNOSIS — M47.817 LUMBOSACRAL SPONDYLOSIS WITHOUT MYELOPATHY: ICD-10-CM

## 2025-06-25 PROCEDURE — 99211 OFF/OP EST MAY X REQ PHY/QHP: CPT | Performed by: PAIN MEDICINE

## 2025-06-25 NOTE — PROGRESS NOTES
Subjective   Patient ID: Lia Jeong is a 75 y.o. female with a past medical history of obesity, DM2, COPD, HFpEF, PE (on warfarin) chronic low back pain, L3 compression fracture, presents today for follow-up after kyphoplasty of the L3 vertebral body on March 25th.     HPI:   Patient reports significant relief of her pain after the procedure. Patient reports that she was having 8/10 pain prior to the procedure. Currently, pain is 3/10. Was prescribed course of norco after the procedures, but patient states that the medication made her drowsy so she stopped taking it early. Currently on gabapentin for her diabetic neuropathy and takes tylenol occasionally as needed. Patient reports significant improvement in her ability to perform transitional movements such as getting out of bed and going from sitting to standing position. She reports being more functional overall and is able to perform her activities of daily living without significant pain.       Review of Systems   13-point ROS done and negative except for HPI.     Current Outpatient Medications   Medication Instructions    albuterol 90 mcg/actuation inhaler inhale two puffs by mouth every 6 hours as needed    atorvastatin (LIPITOR) 80 mg, Nightly    b complex 0.4 mg tablet 1 tablet, Daily    bimatoprost (Lumigan) 0.01 % ophthalmic solution 1 drop, Both Eyes, Nightly    brimonidine (AlphaGAN) 0.2 % ophthalmic solution 1 drop, 2 times daily    cholecalciferol (VITAMIN D-3) 25 mcg, Daily    clonazePAM (KLONOPIN) 1 mg, Every 8 hours PRN    cloNIDine (CATAPRES) 0.1 mg, 2 times daily    dapagliflozin propanediol (FARXIGA) 10 mg, Daily with breakfast    dilTIAZem LA (Cardizem LA) 180 mg 24 hr tablet 1 tablet, Daily    dorzolamide (Trusopt) 2 % ophthalmic solution 1 drop, Daily    ezetimibe (ZETIA) 10 mg, Daily    fluticasone (Flonase) 50 mcg/actuation nasal spray 1 spray, Daily PRN    gabapentin (NEURONTIN) 1,200 mg, 3 times daily    HumaLOG U-100 Insulin 100  unit/mL injection Inject 15 Units under the skin 3 times a day before meals. 15 units with meal    hydrALAZINE (APRESOLINE) 25 mg, oral, 3 times daily, *Please note change in dose    Lantus U-100 Insulin 50 Units, Every 24 hours    metoprolol tartrate (LOPRESSOR) 50 mg, 2 times daily    montelukast (SINGULAIR) 10 mg, Nightly    omega-3 acid ethyl esters (LOVAZA) 1 g, Daily    potassium chloride CR 20 mEq ER tablet TAKE TWO TABLETS (40 MEQ) BY MOUTH DAILY WITH FOOD    semaglutide (OZEMPIC) 1 mg/dose (4 mg/3 mL) pen injector 1 mg every 7 days.    torsemide (DEMADEX) 20 mg, oral, Daily    Trelegy Ellipta 200-62.5-25 mcg blister with device INHALE ONE PUFF BY MOUTH EVERY MORNING    warfarin (COUMADIN) 5 mg       Medical History[1]     Surgical History[2]     Family History[3]     RX Allergies[4]     Objective     Last Recorded Vitals  There were no vitals taken for this visit.    Physical Exam  General: NAD, well groomed, well nourished  Eyes: Non-icteric sclera, EOMI  Ears, Nose, Mouth, and Throat: External ears and nose appear to be without deformity or rash. No lesions or masses noted. Hearing is grossly intact.   Neck: Trachea midline  Respiratory: Nonlabored breathing   Cardiovascular: no peripheral edema   Skin: No rashes or open lesions/ulcers identified on skin.    Back:   Palpation: No tenderness of patient of lumbar midline, lumbar paraspinals, bilateral SI joints  ROM: Normal range of motion of the lumbar flexion extension    Neurologic:   Cranial nerves grossly intact.   Strength bilateral lower extremity 5/5 and symmetric plantar/dorsiflexion   Sensation: Normal to light touch throughout.     Psychiatric: Alert, orientation to person, place, and time. Cooperative.      Relevant Results  Current Outpatient Medications   Medication Instructions    albuterol 90 mcg/actuation inhaler inhale two puffs by mouth every 6 hours as needed    atorvastatin (LIPITOR) 80 mg, Nightly    b complex 0.4 mg tablet 1 tablet,  Daily    bimatoprost (Lumigan) 0.01 % ophthalmic solution 1 drop, Both Eyes, Nightly    brimonidine (AlphaGAN) 0.2 % ophthalmic solution 1 drop, 2 times daily    cholecalciferol (VITAMIN D-3) 25 mcg, Daily    clonazePAM (KLONOPIN) 1 mg, Every 8 hours PRN    cloNIDine (CATAPRES) 0.1 mg, 2 times daily    dapagliflozin propanediol (FARXIGA) 10 mg, Daily with breakfast    dilTIAZem LA (Cardizem LA) 180 mg 24 hr tablet 1 tablet, Daily    dorzolamide (Trusopt) 2 % ophthalmic solution 1 drop, Daily    ezetimibe (ZETIA) 10 mg, Daily    fluticasone (Flonase) 50 mcg/actuation nasal spray 1 spray, Daily PRN    gabapentin (NEURONTIN) 1,200 mg, 3 times daily    HumaLOG U-100 Insulin 100 unit/mL injection Inject 15 Units under the skin 3 times a day before meals. 15 units with meal    hydrALAZINE (APRESOLINE) 25 mg, oral, 3 times daily, *Please note change in dose    Lantus U-100 Insulin 50 Units, Every 24 hours    metoprolol tartrate (LOPRESSOR) 50 mg, 2 times daily    montelukast (SINGULAIR) 10 mg, Nightly    omega-3 acid ethyl esters (LOVAZA) 1 g, Daily    potassium chloride CR 20 mEq ER tablet TAKE TWO TABLETS (40 MEQ) BY MOUTH DAILY WITH FOOD    semaglutide (OZEMPIC) 1 mg/dose (4 mg/3 mL) pen injector 1 mg every 7 days.    torsemide (DEMADEX) 20 mg, oral, Daily    Trelegy Ellipta 200-62.5-25 mcg blister with device INHALE ONE PUFF BY MOUTH EVERY MORNING    warfarin (COUMADIN) 5 mg         MR lumbar spine wo IV contrast 02/05/2025    Narrative  Interpreted By:  Judit Mitchell,  STUDY:  MRI of the lumbar spine without IV contrast;  2/5/2025 9:07 am    INDICATION:  Signs/Symptoms:low back pain.    ,S32.010S Wedge compression fracture of first lumbar vertebra,  sequela,S32.030S Wedge compression fracture of third lumbar vertebra,  sequela,M54.16 Radiculopathy, lumbar region    COMPARISON:  CT abdomen 11/30/2024 and MRI 05/19/2022    ACCESSION NUMBER(S):  [id number]    ORDERING CLINICIAN:  WAGNER [facility]    TECHNIQUE:  Sagittal  and axial STIR and T1-weighted MRI images of the lumbar  spine were acquired using a spondylolysis protocol.  No contrast was  administered.    FINDINGS:  For counting purposes the last fully segmented vertebral body is  labeled L5. There is partial lumbarization of the S1 vertebral body.  The last fully formed disc space on axial image 16 series 9 we will  be labeled L5-S1.    There is trace retrolisthesis of L1 on L2 and trace anterolisthesis  of L5 on S1. Alignment is otherwise maintained.    There is a nonacute prominent Schmorl's node along the superior  endplate of L1, similar to the prior exam.    There is a superior endplate fracture/compression deformity at L3 new  from the prior MRI 05/19/2022 and slightly increased compared to the  prior CTA abdomen 11/30/2024 given differences in technique. There is  a proximally 25% loss of vertebral body height and trace osseous  retropulsion of the superior posterior cortex. There is increased  STIR signal along the superior endplate and anterior L3 vertebral  body extending into the posterior element on the left. Vertebral body  heights are otherwise maintained.    There are numerous T1 and T2 hypointense foci within the T11, T12, L3  and L5 vertebral bodies corresponding to sclerotic foci on the prior  CT and may represent bone islands.    There is desiccated disc signal throughout the lower thoracic and  lumbar spine. No significant loss of disc height.    The conus terminates at L1-L2. Prevertebral soft tissues are not  thickened.    Evaluation by level:    T11-T12: Disc bulge, facet arthrosis and ligamentum flavum  thickening. Mild spinal canal and neural foraminal stenosis.    T12-L1: Disc bulge and facet arthrosis. No spinal canal stenosis.  Mild neural foraminal stenosis.    L1-L2: Disc bulge with a small central disc protrusion, bilateral  facet arthrosis and ligamentum flavum thickening. No spinal canal  stenosis. Mild neural foraminal stenosis.    L2-L3:  Disc bulge, facet arthrosis and ligamentum flavum thickening.  Moderate spinal canal stenosis. Mild-to-moderate bilateral neural  foraminal stenosis.    L3-L4: Disc bulge and facet arthrosis. Mild spinal canal and neural  foraminal stenosis.    L4-L5: Disc bulge, facet arthrosis and ligamentum flavum thickening.  Mild spinal canal stenosis. Mild neural foraminal stenosis.    L5-S1: Mild disc uncovering, disc bulge and facet arthrosis. No  spinal canal stenosis. No neural foraminal stenosis.    Impression  There is an acute to subacute superior endplate fracture and  compression deformity at L3 with 25% loss of vertebral body height  and minimal osseous retropulsion of the posterosuperior cortex.    Degenerative changes most pronounced at L2-L3 with moderate spinal  canal and mild-to-moderate bilateral neural foraminal stenosis.    I personally reviewed the images/study and I agree with the findings  as stated. This study was interpreted at [place], [place].    MACRO:  None    Signed by: Judit Mitchell 2/5/2025 11:21 AM  Dictation workstation:   [id number]      XR hip right with pelvis when performed 2 or 3 views 09/10/2024    Narrative  Interpreted By:  Rhina Ortiz,  STUDY:  Single view pelvis.  Right hip, two views.    INDICATION:  Signs/Symptoms:Right thigh is numb.    COMPARISON:  None.    ACCESSION NUMBER(S):  [id number]    ORDERING CLINICIAN:  CHARLIE BIGGS    FINDINGS:  No acute fracture or malalignment.  Bilateral hip joint spaces are well preserved.  Mild bilateral hip osteoarthrosis with acetabular osteophytes.  Soft tissues are within normal limits.  Lower lumbar facet joint arthropathy noted.  Calcified structure in the pelvis likely representing fibroid.  Femoral artery vascular calcifications.    Impression  1. Mild bilateral hip osteoarthrosis with osteophytosis.  2. Additional findings as above    MACRO:  None.    Signed by: Rhina  Katiajennifer 9/11/2024 6:25 PM  Dictation workstation:   [id number]     No image results found.       No diagnosis found.     Assessment/Plan   Lia Jeong is a 75 y.o. female with a past medical history of obesity, DM2, COPD, HFpEF, PE (on warfarin) chronic low back pain, L3 compression fracture, presents today for follow-up after kyphoplasty of the L3 vertebral body on March 25th. Patient reporting significant pain relief and improvement in her overall mobility since the procedure. Discussed with patient to continue with home based strengthening exercises and low impact aerobic activity for her back and core.     Plan:  -continue home-based strengthening exercises and low impact aerobic activity for her back and core    Follow up: As needed     The patient was invited to contact us back anytime with any questions or concerns and follow-up with us in the office as needed.     There are no diagnoses linked to this encounter.    This note was generated with the aid of dictation software, there may be typos despite my attempts at proofreading.     Patient seen and plan of care discussed with Dr. Colon.     Анна Mason MD  Anesthesiology PGY2          [1]   Past Medical History:  Diagnosis Date    Allergic     Arthritis     Asthma     Cataract     CHF (congestive heart failure)     Clotting disorder (Multi)     Colon polyp     COPD (chronic obstructive pulmonary disease) (Multi)     Diabetes mellitus (Multi)     Diabetic retinopathy (Multi)     Dry eyes     Easy bruising     Fractures     GERD (gastroesophageal reflux disease)     Gestational diabetes     Glaucoma     Hyperlipidemia     Hypertension     Obesity     Peripheral neuropathy     Pulmonary embolus     Shortness of breath     Sleep apnea     Type 2 diabetes mellitus     Visual impairment    [2]   Past Surgical History:  Procedure Laterality Date    CAPSULOTOMY Right 05/20/2025    Dr. Carson    CATARACT EXTRACTION W/  INTRAOCULAR LENS IMPLANT Right  10/19/2023    Dr. Carson    CATARACT EXTRACTION W/  INTRAOCULAR LENS IMPLANT Left 09/28/2023    Dr. Carson    COLONOSCOPY      EYE SURGERY  03/19/2015    Eye Surgery    EYE SURGERY Right 05/20/2025    INTRAOCULAR LENS INSERTION      MR HEAD ANGIO WO IV CONTRAST  05/18/2022    MR HEAD ANGIO WO IV CONTRAST 5/18/2022 [facility]    MR NECK ANGIO WO IV CONTRAST  05/18/2022    MR NECK ANGIO WO IV CONTRAST 5/18/2022 [facility]    TONSILLECTOMY  09/28/2017    Tonsillectomy    TUBAL LIGATION  09/28/2017    Tubal Ligation   [3]   Family History  Problem Relation Name Age of Onset    Hypertension Mother Betty Garza     Hyperlipidemia Mother Betty Garza     Diabetes Mother Betty Garza     Breast cancer Sister      Hypertension Father Jaun MARCUSKennedy GARZA     Breast cancer Sister Marina Johnson     Hypertension Sister Marina Leizabeth    [4]   Allergies  Allergen Reactions    Amoxicillin Diarrhea    Amoxicillin-Pot Clavulanate Diarrhea     SEVERE DIARRHEA    Hydrochlorothiazide Other     DECREASE SEXUAL LIBIDO    Low libido    Lisinopril Cough     COUGH    Losartan Cough

## 2025-07-24 ENCOUNTER — APPOINTMENT (OUTPATIENT)
Dept: ALLERGY | Facility: CLINIC | Age: 75
End: 2025-07-24
Payer: MEDICARE

## 2025-07-24 VITALS
TEMPERATURE: 97.9 F | SYSTOLIC BLOOD PRESSURE: 138 MMHG | WEIGHT: 210 LBS | HEART RATE: 71 BPM | HEIGHT: 65 IN | BODY MASS INDEX: 34.99 KG/M2 | OXYGEN SATURATION: 98 % | DIASTOLIC BLOOD PRESSURE: 66 MMHG | RESPIRATION RATE: 17 BRPM

## 2025-07-24 DIAGNOSIS — J31.0 ATROPHIC RHINITIS: ICD-10-CM

## 2025-07-24 DIAGNOSIS — J32.9 CHRONIC RHINOSINUSITIS: Primary | ICD-10-CM

## 2025-07-24 PROCEDURE — 99204 OFFICE O/P NEW MOD 45 MIN: CPT | Performed by: ALLERGY & IMMUNOLOGY

## 2025-07-24 RX ORDER — IPRATROPIUM BROMIDE 21 UG/1
2 SPRAY, METERED NASAL EVERY 12 HOURS
Qty: 30 ML | Refills: 12 | Status: SHIPPED | OUTPATIENT
Start: 2025-07-24 | End: 2026-07-24

## 2025-07-24 RX ORDER — LORATADINE 10 MG/1
10 TABLET ORAL DAILY
COMMUNITY

## 2025-07-24 NOTE — PROGRESS NOTES
Patient ID: Lia Jeong is a 75 y.o. female.     Chief Complaint: NPV referred by Dr. Gray  History Of Present Illness  Lia Jeong is a 75 y.o. female with PMx allergic rhinitis presenting for consultation.  She is here with her  today.    Food Allergy  No    Eczema/ Atopic Dermatitis  No    Asthma  Asthma  Current medication: Trelegy 200, as needed albuterol  Hospitalizations/ER visits in the last year: none  Oral steroid/systemic steroids in the last year: in the past, not recent  Triggers: possibly pollen? Heat.  Smoker or Smoke exposure: never  Family history:  Family History[1]    None  Rhinoconjunctivitis  Sneezing, rhinitis  Claritin and Flonase daily-not really helping the symptoms  Never tested for allergy    Drug Allergy   Amox-diarrhea    Insect Allergy   No    Infections  History of chronic sinus symptoms in the last couple of years.  No history of sinus surgery.  Has been told she has a deviated nasal septum and the right side feels more blocked more than the left.      Review of Systems    Pertinent positives and negatives have been assessed in the HPI. All other systems have been reviewed and are negative except as noted in the HPI.    Allergies  Amoxicillin, Amoxicillin-pot clavulanate, Hydrochlorothiazide, Lisinopril, and Losartan    Past Medical History  She has a past medical history of Allergic, Arthritis, Asthma, Cataract, CHF (congestive heart failure), Clotting disorder (Multi), Colon polyp, COPD (chronic obstructive pulmonary disease) (Multi), Diabetes mellitus (Multi), Diabetic retinopathy (Multi), Dry eyes, Easy bruising, Fractures, GERD (gastroesophageal reflux disease), Gestational diabetes, Glaucoma, Hyperlipidemia, Hypertension, Obesity, Peripheral neuropathy, Pulmonary embolus, Shortness of breath, Sleep apnea, Type 2 diabetes mellitus, and Visual impairment.    Family History  Family History[2]      Surgical History  She has a past surgical history that includes Eye  "surgery (03/19/2015); Tonsillectomy (09/28/2017); Tubal ligation (09/28/2017); MR angio head wo IV contrast (05/18/2022); MR angio neck wo IV contrast (05/18/2022); Cataract extraction w/  intraocular lens implant (Right, 10/19/2023); Colonoscopy; Intraocular lens insertion; Cataract extraction w/  intraocular lens implant (Left, 09/28/2023); Eye surgery (Right, 05/20/2025); and Capsulotomy (Right, 05/20/2025).    Social/Environmental History  She reports that she has never smoked. She has never been exposed to tobacco smoke. She has never used smokeless tobacco. She reports that she does not drink alcohol and does not use drugs.    Home: Lives in a house   Floors: Wood  Air Conditioning: Central  Smoker: No  Pets: cat  Infestations: No  Molds: No  Occupation: retired.  HS teacher-retired.    MEDICATIONS  Medications Ordered Prior to Encounter[3]      Physical Exam  Visit Vitals  /66   Pulse 71   Temp 36.6 °C (97.9 °F) (Temporal)   Resp 17   Ht 1.651 m (5' 5\")   Wt 95.3 kg (210 lb)   SpO2 98%   BMI 34.95 kg/m²   OB Status Postmenopausal   Smoking Status Never   BSA 2.09 m²       Wt Readings from Last 1 Encounters:   07/24/25 95.3 kg (210 lb)       Physical Exam    General: Well appearing, no acute distress  Head: Normocephalic, atraumatic, neck supple without lymphadenopathy  Eyes: EOMI, non-injected  Nose: No nasal crease, nares patent, slightly boggy turbinates, minimal discharge  Throat: Normal dentition, no erythema  Heart: Regular rate and rhythm  Lungs: Clear to auscultation bilaterally, effort normal  Abdomen: Soft, non-tender, normal bowel sounds  Extremities: Moves all extremities symmetrically, no edema  Skin: No rashes/lesions  Psych: normal mood and affect    LAB RESULTS:  CBC:  Recent Labs     03/18/25  0926 01/10/25  0941 10/07/24  1051   WBC 7.4 7.5 7.0   HGB 13.4 13.5 13.9   HCT 42.0 42.0 44.9    299 290   MCV 87 85 84   EOSABS 0.05 0.04 0.07       CMP:  Recent Labs     03/18/25  0926 " 01/10/25  0941 10/07/24  1051 10/11/23  1038 10/05/23  0652 10/04/23  1143 10/04/23  0700 10/03/23  2145 10/03/23  0657    141 141   < > 132*   < > 130*   < > 134*   K 5.0 4.5 4.8   < > 3.9   < > 3.1*   < > 3.7    105 104   < > 96*   < > 91*   < > 99   CO2 28 27 28   < > 27   < > 29   < > 26   ANIONGAP 11 14 14   < > 13   < > 13   < > 13   BUN 19 17 17   < > 31*   < > 30*   < > 22   CREATININE 0.85 0.74 0.85   < > 0.90   < > 0.99   < > 0.84   EGFR 72 84 72   < > 68   < > 60*   < > 73   MG  --   --   --   --  1.90  --  2.00  --  1.60    < > = values in this interval not displayed.     Recent Labs     03/18/25  0926 01/10/25  0941 10/07/24  1051   ALBUMIN 3.9 3.7 3.8   ALKPHOS 118 124 132   ALT 24 34 24   AST 24 27 22   BILITOT 0.4 0.4 0.4         Recent Labs     03/18/25  0926 01/10/25  0941 10/07/24  1051   EOSABS 0.05 0.04 0.07       COAG:   Recent Labs     06/13/25  0738 03/18/25  0926 10/05/23  0652   INR 1.2 2.4* 1.6*     HEME/ENDO:  Recent Labs     07/02/25  0833 03/18/25  0926 02/11/25  0855 07/29/24  0836 07/09/24  1038 04/29/24  0946 04/12/24  0818 03/10/23  1005 03/08/23  0810   TSH  --   --   --   --  1.81  --  1.59  --  1.74   HGBA1C 7.1* 7.7* 7.8*   < > 8.1*   < > 9.3*   < >  --     < > = values in this interval not displayed.         Assessment/Plan   Lia is a 74 yo woman with history of rhinitis, sneeze and congestion. She has a history of sinus infections as well as asthma. She does not feel that Claritin and Flonase are offering good effect for her symptoms.  I recommend discontinuing the Claritin and Flonase and follow up for allergy skin tests for environmental allergy. For intermittent rhinorrhea, I recommend as needed ipratropium. Patient will schedule follow up for skin tests.    Esperanza Conklin DO          [1]   Family History  Problem Relation Name Age of Onset    Hypertension Mother Betty Gotti     Hyperlipidemia Mother Betty Gotti     Diabetes Mother Betty  Shen     Breast cancer Sister      Hypertension Father Jaun GARZA     Breast cancer Sister Marina Johnson     Hypertension Sister Marina Johnson    [2]   Family History  Problem Relation Name Age of Onset    Hypertension Mother Betty Garza     Hyperlipidemia Mother Betty Garza     Diabetes Mother Betty Garza     Breast cancer Sister      Hypertension Father Jaun GARZA     Breast cancer Sister Marina Johnson     Hypertension Sister Marina Johnson    [3]   Current Outpatient Medications on File Prior to Visit   Medication Sig Dispense Refill    albuterol 90 mcg/actuation inhaler inhale two puffs by mouth every 6 hours as needed      atorvastatin (Lipitor) 80 mg tablet Take 1 tablet (80 mg) by mouth once daily at bedtime.      b complex 0.4 mg tablet Take 1 tablet by mouth once daily.      bimatoprost (Lumigan) 0.01 % ophthalmic solution Administer 1 drop into both eyes once daily at bedtime. 7.5 mL 3    brimonidine (AlphaGAN) 0.2 % ophthalmic solution Administer 1 drop into both eyes 2 times a day.      cholecalciferol (Vitamin D-3) 25 mcg (1,000 units) capsule Take 1 capsule (25 mcg) by mouth once daily.      clonazePAM (KlonoPIN) 1 mg tablet Take 1 tablet (1 mg) by mouth every 8 hours if needed.      cloNIDine (Catapres) 0.1 mg tablet Take 1 tablet (0.1 mg) by mouth twice a day.      dapagliflozin propanediol (Farxiga) 10 mg tablet Take 1 tablet (10 mg) by mouth once daily with breakfast.      dilTIAZem LA (Cardizem LA) 180 mg 24 hr tablet Take 1 tablet (180 mg) by mouth once daily.      dorzolamide (Trusopt) 2 % ophthalmic solution Administer 1 drop into both eyes once daily.      ezetimibe (Zetia) 10 mg tablet Take 1 tablet (10 mg) by mouth once daily.      fluticasone (Flonase) 50 mcg/actuation nasal spray Administer 1 spray into each nostril once daily as needed.      HumaLOG U-100 Insulin 100 unit/mL injection Inject 15 Units under the skin 3 times a day before meals. 15 units with meal       insulin glargine (Lantus U-100 Insulin) 100 unit/mL injection Inject 50 Units under the skin once every 24 hours. Take as directed per insulin instructions.      loratadine (Claritin) 10 mg tablet Take 1 tablet (10 mg) by mouth once daily.      metoprolol tartrate (Lopressor) 100 mg tablet Take 0.5 tablets (50 mg) by mouth 2 times a day.      montelukast (Singulair) 10 mg tablet Take 1 tablet (10 mg) by mouth once daily at bedtime.      omega-3 acid ethyl esters (Lovaza) 1 gram capsule Take 1 capsule (1 g) by mouth once daily.      potassium chloride CR 20 mEq ER tablet TAKE TWO TABLETS (40 MEQ) BY MOUTH DAILY WITH FOOD      semaglutide (OZEMPIC) 1 mg/dose (4 mg/3 mL) pen injector 1 mg every 7 days.      Trelegy Ellipta 200-62.5-25 mcg blister with device INHALE ONE PUFF BY MOUTH EVERY MORNING      warfarin (Coumadin) 5 mg tablet Take 1 tablet (5 mg) by mouth. Take as directed per After Visit Summary. Pt takes 1.5 tablet Tuesday, Thursday, Saturday and Sunday. Monday Wednesday and Friday 1 tablet      gabapentin (Neurontin) 600 mg tablet Take 2 tablets (1,200 mg) by mouth 3 times a day.      hydrALAZINE (Apresoline) 50 mg tablet Take 0.5 tablets (25 mg) by mouth 3 times a day. *Please note change in dose (Patient taking differently: Take 1 tablet (50 mg) by mouth 3 times a day. *Please note change in dose) 45 tablet 0    torsemide (Demadex) 20 mg tablet Take 1 tablet (20 mg) by mouth once daily. (Patient taking differently: Take 2 tablets (40 mg) by mouth once daily.) 30 tablet 0     No current facility-administered medications on file prior to visit.

## 2025-07-24 NOTE — PATIENT INSTRUCTIONS
Discontinue Claritin for now. Will need to be off of this for skin tests  Flonase-discontinue as it is not providing good effect.    Try as needed ipratropium.

## 2025-08-13 ENCOUNTER — APPOINTMENT (OUTPATIENT)
Dept: OPHTHALMOLOGY | Facility: CLINIC | Age: 75
End: 2025-08-13
Payer: COMMERCIAL

## 2025-08-13 DIAGNOSIS — H04.123 DRY EYES: ICD-10-CM

## 2025-08-13 DIAGNOSIS — H40.1131 PRIMARY OPEN ANGLE GLAUCOMA (POAG) OF BOTH EYES, MILD STAGE: Primary | ICD-10-CM

## 2025-08-13 DIAGNOSIS — Z98.41 HISTORY OF YAG LASER CAPSULOTOMY OF LENS OF RIGHT EYE: ICD-10-CM

## 2025-08-13 DIAGNOSIS — H26.492 PCO (POSTERIOR CAPSULAR OPACIFICATION), LEFT: ICD-10-CM

## 2025-08-13 DIAGNOSIS — Z96.1 PSEUDOPHAKIA: ICD-10-CM

## 2025-08-13 DIAGNOSIS — H26.491 PCO (POSTERIOR CAPSULAR OPACIFICATION), RIGHT: ICD-10-CM

## 2025-08-13 PROCEDURE — 92012 INTRM OPH EXAM EST PATIENT: CPT | Performed by: OPHTHALMOLOGY

## 2025-08-13 RX ORDER — BRIMONIDINE TARTRATE 2 MG/ML
1 SOLUTION/ DROPS OPHTHALMIC 2 TIMES DAILY
Qty: 10 ML | Refills: 3 | Status: SHIPPED | OUTPATIENT
Start: 2025-08-13 | End: 2025-11-11

## 2025-08-13 RX ORDER — DORZOLAMIDE HCL 20 MG/ML
1 SOLUTION/ DROPS OPHTHALMIC 2 TIMES DAILY
Qty: 10 ML | Refills: 3 | Status: SHIPPED | OUTPATIENT
Start: 2025-08-13 | End: 2025-11-11

## 2025-08-13 ASSESSMENT — TONOMETRY
IOP_METHOD: GOLDMANN APPLANATION
OS_IOP_MMHG: 15
OD_IOP_MMHG: 15

## 2025-08-13 ASSESSMENT — SLIT LAMP EXAM - LIDS
COMMENTS: GOOD POSITION
COMMENTS: GOOD POSITION

## 2025-08-13 ASSESSMENT — ENCOUNTER SYMPTOMS: EYES NEGATIVE: 1

## 2025-08-13 ASSESSMENT — PACHYMETRY
OS_CT(UM): 542
OD_CT(UM): 563

## 2025-08-13 ASSESSMENT — VISUAL ACUITY
OD_CC: 20/30
OS_CC: 20/30-2
METHOD: SNELLEN - LINEAR

## 2025-08-13 ASSESSMENT — EXTERNAL EXAM - RIGHT EYE: OD_EXAM: NORMAL

## 2025-08-13 ASSESSMENT — EXTERNAL EXAM - LEFT EYE: OS_EXAM: NORMAL

## 2025-08-17 ASSESSMENT — SLIT LAMP EXAM - LIDS
COMMENTS: GOOD POSITION
COMMENTS: GOOD POSITION

## 2025-08-17 ASSESSMENT — CUP TO DISC RATIO
OD_RATIO: 0.8
OS_RATIO: 0.8

## 2025-08-17 ASSESSMENT — EXTERNAL EXAM - LEFT EYE: OS_EXAM: NORMAL

## 2025-08-17 ASSESSMENT — EXTERNAL EXAM - RIGHT EYE: OD_EXAM: NORMAL

## 2025-08-19 ENCOUNTER — APPOINTMENT (OUTPATIENT)
Dept: OPHTHALMOLOGY | Facility: CLINIC | Age: 75
End: 2025-08-19
Payer: MEDICARE

## 2025-08-19 DIAGNOSIS — H52.4 PRESBYOPIA: ICD-10-CM

## 2025-08-19 DIAGNOSIS — Z98.41 HISTORY OF YAG LASER CAPSULOTOMY OF LENS OF RIGHT EYE: Primary | ICD-10-CM

## 2025-08-19 DIAGNOSIS — H52.223 REGULAR ASTIGMATISM OF BOTH EYES: ICD-10-CM

## 2025-08-19 DIAGNOSIS — E11.3393 MODERATE NONPROLIFERATIVE DIABETIC RETINOPATHY OF BOTH EYES WITHOUT MACULAR EDEMA ASSOCIATED WITH TYPE 2 DIABETES MELLITUS: ICD-10-CM

## 2025-08-19 DIAGNOSIS — H52.03 HYPEROPIA OF BOTH EYES: ICD-10-CM

## 2025-08-19 DIAGNOSIS — H26.492 PCO (POSTERIOR CAPSULAR OPACIFICATION), LEFT: ICD-10-CM

## 2025-08-19 DIAGNOSIS — D31.32 CHOROIDAL NEVUS, LEFT EYE: ICD-10-CM

## 2025-08-19 DIAGNOSIS — Z96.1 PSEUDOPHAKIA: ICD-10-CM

## 2025-08-19 DIAGNOSIS — Z98.42 HISTORY OF YAG LASER CAPSULOTOMY OF LENS OF LEFT EYE: ICD-10-CM

## 2025-08-19 DIAGNOSIS — H04.123 DRY EYES: ICD-10-CM

## 2025-08-19 DIAGNOSIS — H40.1131 PRIMARY OPEN ANGLE GLAUCOMA (POAG) OF BOTH EYES, MILD STAGE: ICD-10-CM

## 2025-08-19 DIAGNOSIS — H26.491 PCO (POSTERIOR CAPSULAR OPACIFICATION), RIGHT: ICD-10-CM

## 2025-08-19 PROCEDURE — 99024 POSTOP FOLLOW-UP VISIT: CPT | Performed by: OPHTHALMOLOGY

## 2025-08-19 ASSESSMENT — VISUAL ACUITY
OS_CC: 20/30
CORRECTION_TYPE: GLASSES
OD_BAT_MED: 20/30
METHOD: SNELLEN - LINEAR
OD_CC: 20/30

## 2025-08-19 ASSESSMENT — REFRACTION
OS_SPHERE: +0.25
OS_CYLINDER: -0.50
OS_AXIS: 075
OD_SPHERE: +0.75
OS_ADD: +2.75
OD_CYLINDER: -1.00
OD_ADD: +2.75
OD_AXIS: 080

## 2025-08-19 ASSESSMENT — REFRACTION_MANIFEST
OD_CYLINDER: -1.00
OD_AXIS: 080
OD_ADD: +2.50
OS_SPHERE: +0.75
OS_CYLINDER: -0.25
OS_AXIS: 075
OS_ADD: +2.50
OD_SPHERE: +1.25

## 2025-08-19 ASSESSMENT — ENCOUNTER SYMPTOMS
NEUROLOGICAL NEGATIVE: 0
HEMATOLOGIC/LYMPHATIC NEGATIVE: 0
ALLERGIC/IMMUNOLOGIC NEGATIVE: 0
PSYCHIATRIC NEGATIVE: 0
CONSTITUTIONAL NEGATIVE: 0
MUSCULOSKELETAL NEGATIVE: 0
ENDOCRINE NEGATIVE: 0
EYES NEGATIVE: 1
CARDIOVASCULAR NEGATIVE: 0
GASTROINTESTINAL NEGATIVE: 0
RESPIRATORY NEGATIVE: 0

## 2025-08-19 ASSESSMENT — REFRACTION_WEARINGRX
OS_CYLINDER: -0.25
OS_AXIS: 075
OS_ADD: +2.50
OD_CYLINDER: -1.00
OD_ADD: +2.50
OD_AXIS: 080
OD_SPHERE: +1.25
OS_SPHERE: +0.75

## 2025-08-19 ASSESSMENT — PACHYMETRY
OD_CT(UM): 563
OS_CT(UM): 542

## 2025-08-19 ASSESSMENT — TONOMETRY
OS_IOP_MMHG: 16
IOP_METHOD: GOLDMANN APPLANATION
OD_IOP_MMHG: 16

## 2025-08-29 ENCOUNTER — APPOINTMENT (OUTPATIENT)
Dept: PULMONOLOGY | Facility: CLINIC | Age: 75
End: 2025-08-29
Payer: MEDICARE

## 2025-09-02 ENCOUNTER — OFFICE VISIT (OUTPATIENT)
Dept: OPHTHALMOLOGY | Facility: CLINIC | Age: 75
End: 2025-09-02
Payer: MEDICARE

## 2025-09-02 ENCOUNTER — APPOINTMENT (OUTPATIENT)
Dept: OPHTHALMOLOGY | Facility: CLINIC | Age: 75
End: 2025-09-02
Payer: MEDICARE

## 2025-09-02 DIAGNOSIS — H40.1131 PRIMARY OPEN ANGLE GLAUCOMA (POAG) OF BOTH EYES, MILD STAGE: ICD-10-CM

## 2025-09-02 DIAGNOSIS — E11.3393 MODERATE NONPROLIFERATIVE DIABETIC RETINOPATHY OF BOTH EYES WITHOUT MACULAR EDEMA ASSOCIATED WITH TYPE 2 DIABETES MELLITUS: Primary | ICD-10-CM

## 2025-09-02 PROCEDURE — 99024 POSTOP FOLLOW-UP VISIT: CPT | Performed by: OPHTHALMOLOGY

## 2025-09-02 PROCEDURE — 92083 EXTENDED VISUAL FIELD XM: CPT | Performed by: OPHTHALMOLOGY

## 2025-09-02 ASSESSMENT — VISUAL ACUITY
CORRECTION_TYPE: GLASSES
OD_PH_CC: 20/25+1
OS_CC: 20/30-1
OD_CC: 20/40
METHOD: SNELLEN - LINEAR

## 2025-09-02 ASSESSMENT — EXTERNAL EXAM - RIGHT EYE: OD_EXAM: NORMAL

## 2025-09-02 ASSESSMENT — TONOMETRY
IOP_METHOD: GOLDMANN APPLANATION
OD_IOP_MMHG: 14
OS_IOP_MMHG: 14

## 2025-09-02 ASSESSMENT — SLIT LAMP EXAM - LIDS
COMMENTS: GOOD POSITION
COMMENTS: GOOD POSITION

## 2025-09-02 ASSESSMENT — PACHYMETRY
OD_CT(UM): 563
OS_CT(UM): 542

## 2025-09-02 ASSESSMENT — CUP TO DISC RATIO
OS_RATIO: 0.8
OD_RATIO: 0.8

## 2025-09-02 ASSESSMENT — EXTERNAL EXAM - LEFT EYE: OS_EXAM: NORMAL

## 2025-11-18 ENCOUNTER — APPOINTMENT (OUTPATIENT)
Dept: OPHTHALMOLOGY | Facility: CLINIC | Age: 75
End: 2025-11-18
Payer: MEDICARE

## 2025-12-29 ENCOUNTER — APPOINTMENT (OUTPATIENT)
Dept: DERMATOLOGY | Facility: CLINIC | Age: 75
End: 2025-12-29
Payer: MEDICARE

## 2026-01-14 ENCOUNTER — APPOINTMENT (OUTPATIENT)
Dept: OPHTHALMOLOGY | Facility: CLINIC | Age: 76
End: 2026-01-14
Payer: MEDICARE

## (undated) DEVICE — APPLICATOR, COTTON TIP, 6 IN, 2PK, STERILE

## (undated) DEVICE — GLOVE, SURGICAL, PROTEXIS PI , 6.0, PF, LF

## (undated) DEVICE — SOLUTION, OPHTHALMIC, TETRACAINE HCL 0.5%, 2 ML, VIAL

## (undated) DEVICE — DRESSING, TRANSPARENT, TEGADERM, 4 X 4-3/4 IN

## (undated) DEVICE — DRAPE, SHEET, FAN FOLDED, HALF, 44 X 58 IN, DISPOSABLE, LF, STERILE

## (undated) DEVICE — Device

## (undated) DEVICE — ADHESIVE, SKIN, MASTISOL, 2/3 CC VIAL

## (undated) DEVICE — DRESSING, GAUZE, KERLIX, 12 PLY, 4 X 4 IN, STERILE

## (undated) DEVICE — TOWELS 4-PK

## (undated) DEVICE — APPLICATOR, PREP, ONE-STEP, ANTIMICROBIAL, CHLORAPREP, TINTED, 10.5ML

## (undated) DEVICE — STRIP, SKIN CLOSURE, STERI STRIP, REINFORCED, 0.5 X 4 IN

## (undated) DEVICE — ANESTHESIA TRAY, EPIDURAL, SINGLE SHOT, CUSTOM